# Patient Record
Sex: FEMALE | Race: BLACK OR AFRICAN AMERICAN | NOT HISPANIC OR LATINO
[De-identification: names, ages, dates, MRNs, and addresses within clinical notes are randomized per-mention and may not be internally consistent; named-entity substitution may affect disease eponyms.]

---

## 2017-12-11 PROBLEM — Z00.00 ENCOUNTER FOR PREVENTIVE HEALTH EXAMINATION: Status: ACTIVE | Noted: 2017-12-11

## 2017-12-15 ENCOUNTER — APPOINTMENT (OUTPATIENT)
Dept: UROLOGY | Facility: CLINIC | Age: 57
End: 2017-12-15

## 2018-01-16 ENCOUNTER — OUTPATIENT (OUTPATIENT)
Dept: OUTPATIENT SERVICES | Facility: HOSPITAL | Age: 58
LOS: 1 days | Discharge: HOME | End: 2018-01-16

## 2018-01-16 ENCOUNTER — RESULT REVIEW (OUTPATIENT)
Age: 58
End: 2018-01-16

## 2018-01-16 ENCOUNTER — APPOINTMENT (OUTPATIENT)
Dept: UROLOGY | Facility: CLINIC | Age: 58
End: 2018-01-16
Payer: COMMERCIAL

## 2018-01-16 VITALS
BODY MASS INDEX: 38.36 KG/M2 | DIASTOLIC BLOOD PRESSURE: 76 MMHG | HEIGHT: 71 IN | WEIGHT: 274 LBS | SYSTOLIC BLOOD PRESSURE: 144 MMHG | HEART RATE: 89 BPM

## 2018-01-16 DIAGNOSIS — N28.9 DISORDER OF KIDNEY AND URETER, UNSPECIFIED: ICD-10-CM

## 2018-01-16 DIAGNOSIS — N28.1 CYST OF KIDNEY, ACQUIRED: ICD-10-CM

## 2018-01-16 DIAGNOSIS — I10 ESSENTIAL (PRIMARY) HYPERTENSION: ICD-10-CM

## 2018-01-16 DIAGNOSIS — H40.9 UNSPECIFIED GLAUCOMA: ICD-10-CM

## 2018-01-16 PROCEDURE — 99204 OFFICE O/P NEW MOD 45 MIN: CPT

## 2018-01-16 RX ORDER — BRIMONIDINE TARTRATE, TIMOLOL MALEATE 2; 5 MG/ML; MG/ML
0.2-0.5 SOLUTION/ DROPS OPHTHALMIC
Refills: 0 | Status: ACTIVE | COMMUNITY

## 2018-01-16 RX ORDER — LISINOPRIL AND HYDROCHLOROTHIAZIDE TABLETS 10; 12.5 MG/1; MG/1
10-12.5 TABLET ORAL
Refills: 0 | Status: ACTIVE | COMMUNITY

## 2018-01-16 RX ORDER — AMLODIPINE BESYLATE 10 MG/1
10 TABLET ORAL
Refills: 0 | Status: ACTIVE | COMMUNITY

## 2018-01-17 ENCOUNTER — RESULT REVIEW (OUTPATIENT)
Age: 58
End: 2018-01-17

## 2018-01-23 LAB
APPEARANCE UR: NORMAL
BACTERIA UR CULT: NORMAL
BILIRUB UR QL STRIP: NEGATIVE
COLOR UR: YELLOW
GLUCOSE UR STRIP-MCNC: NEGATIVE MG/DL
HGB UR QL STRIP: NEGATIVE
KETONES UR STRIP-MCNC: NEGATIVE MG/DL
NITRITE UR QL STRIP: NEGATIVE
PH UR STRIP: 6.5
PROT UR STRIP-MCNC: NEGATIVE MG/DL
SP GR UR STRIP: 1.02
URINE COMP/EPITH (NORTH): ABNORMAL
UROBILINOGEN UR STRIP-MCNC: 1 MG/DL
WBC URNS QL MICRO: ABNORMAL
WBC URNS QL MICRO: ABNORMAL P/HPF

## 2018-01-30 ENCOUNTER — TRANSCRIPTION ENCOUNTER (OUTPATIENT)
Age: 58
End: 2018-01-30

## 2018-03-19 ENCOUNTER — APPOINTMENT (OUTPATIENT)
Dept: UROLOGY | Facility: CLINIC | Age: 58
End: 2018-03-19

## 2019-01-22 ENCOUNTER — APPOINTMENT (OUTPATIENT)
Dept: UROLOGY | Facility: CLINIC | Age: 59
End: 2019-01-22

## 2020-12-02 ENCOUNTER — RESULT REVIEW (OUTPATIENT)
Age: 60
End: 2020-12-02

## 2021-02-24 ENCOUNTER — APPOINTMENT (OUTPATIENT)
Dept: VASCULAR SURGERY | Facility: CLINIC | Age: 61
End: 2021-02-24
Payer: COMMERCIAL

## 2021-02-24 VITALS
HEIGHT: 71 IN | TEMPERATURE: 94.6 F | SYSTOLIC BLOOD PRESSURE: 145 MMHG | HEART RATE: 88 BPM | BODY MASS INDEX: 35 KG/M2 | WEIGHT: 250 LBS | DIASTOLIC BLOOD PRESSURE: 88 MMHG

## 2021-02-24 PROCEDURE — 93978 VASCULAR STUDY: CPT

## 2021-02-24 PROCEDURE — 99072 ADDL SUPL MATRL&STAF TM PHE: CPT

## 2021-02-24 PROCEDURE — 99202 OFFICE O/P NEW SF 15 MIN: CPT

## 2021-02-24 NOTE — DATA REVIEWED
[FreeTextEntry1] : aortic duplex patent abdominal aorta with no evidence or aneurysmal dilation. Bilateral diffuse fibrocalcific plaque noted through out the aorto iliac arteries with no evidence of hemodynamically significant disease.

## 2021-02-24 NOTE — HISTORY OF PRESENT ILLNESS
[FreeTextEntry1] : The patient is a 60-year-old female who had lumbar spine x-rays and had an incidental finding of atherosclerotic disease of her aorta. Patient presents for evaluation. She denies claudication symptoms. She denies a history of smoking or  hypercholesterolemia.

## 2022-06-29 ENCOUNTER — NON-APPOINTMENT (OUTPATIENT)
Age: 62
End: 2022-06-29

## 2022-06-30 ENCOUNTER — APPOINTMENT (OUTPATIENT)
Dept: BREAST CENTER | Facility: CLINIC | Age: 62
End: 2022-06-30

## 2022-06-30 VITALS
DIASTOLIC BLOOD PRESSURE: 78 MMHG | SYSTOLIC BLOOD PRESSURE: 146 MMHG | BODY MASS INDEX: 43.65 KG/M2 | WEIGHT: 262 LBS | TEMPERATURE: 97.7 F | HEIGHT: 65 IN

## 2022-06-30 PROCEDURE — 99203 OFFICE O/P NEW LOW 30 MIN: CPT

## 2022-06-30 NOTE — PHYSICAL EXAM
[Normocephalic] : normocephalic [Atraumatic] : atraumatic [EOMI] : extra ocular movement intact [Examined in the supine and seated position] : examined in the supine and seated position [Symmetrical] : symmetrical [No dominant masses] : no dominant masses in right breast  [No dominant masses] : no dominant masses left breast [No Nipple Retraction] : no left nipple retraction [No Nipple Discharge] : no left nipple discharge [de-identified] : On physical exam, there are no discrete masses in either breast or axilla. There is right breast 7 o'clock clear nipple discharge noted,  no nipple discharge in left breast or inversion bilaterally. There are no skin changes bilaterally.\par \par

## 2022-06-30 NOTE — HISTORY OF PRESENT ILLNESS
[FreeTextEntry1] : Alley is 61 year old female here with new dx of RIGHT breast FA. The patient is seen for right breast pain and bloody nipple discharge. She has no other breast related complaints\par \par \par Her imaging is as follows:\par 03/29/2022 b/l dx mammo and US\par -scattered areas of fibroglandular density.\par - 1.4 cm lobular mass at 3 o'clock position right breast, 7.5 cm from the nipple, new from prior study. \par -There is lobular densities in the retroareolar region of the right breast, increasing from prior study.\par - There is a 1.6 cm lobular mass in the upper outer quadrant right breast, stable in size from prior study in 2016 with interval developing coarse calcifications, suggesting degenerating fibroadenoma. \par \par Right US\par - 1.4 x 0.5 x 1 cm lobular hypoechoic mass at 3 o'clock position, 5 cm from nipple, correlating to the mammogram finding-->ultrasound-guided biopsy \par -1 x 0.7 x 0.9 cm hypoechoic mass with associated calcifications at 10 o'clock position, 4 cm from nipple, corresponding to the mammogram finding, suggesting degenerating fibroadenoma.\par - moderate ductal ectasia with internal debris in the retroareolar region right breast. No definite intraductal mass lesion is identified--> breast MRI study without and with intravenous contrast is suggested.\par BIRADS4\par \par 5/05/2022 b/l breast MRI\par -MR guided biopsy is recommended for suspicious enhancing ducts in the central outer right breast (annotated).\par -MR guided biopsy is recommended for suspicious non-mass enhancement in the upper outer right breast 9-10cmfn (annotated).\par - A 0.9 cm mass in the upper inner right breast 7-cmfn corresponds a right 3:00 5 cm from the nipple axis mass described on diagnostic workup dated 3/29/2022. Clinical correlation with pathology results from the ultrasound-guided core biopsy performed today is recommended for this finding.\par  BI-RADS 4\par \par 5/5/22 \par  US guided 1.5 x 0.7 cm mass in the right 3N 5 ( heart)\par -Fibroadenoma\par \par HISTORICAL RISK FACTORS:\par --no fam hx of breast or ovarian cancr \par -one previous cyst aspiration \par -G0\par -OCP use x 1 year \par -right oophorectomy for cystic ovary

## 2022-06-30 NOTE — DATA REVIEWED
[FreeTextEntry1] : : 03-\par  \par EXAM: DIGITAL BILATERAL DIAGNOSTIC MAMMOGRAM AND TOMOSYNTHESIS AND BREAST ULTRASOUND\par \par HISTORY: The patient is seen for right breast pain and bloody nipple discharge. Family history of breast cancer: None.\par Age: 61 years old. \par \par CLINICAL BREAST EXAMINATION: The patient reports clinical breast exam was more than one year ago. \par \par COMPARISON: The present examination has been compared to prior breast imaging studies dating back to 12/13/2014. \par \par MAMMOGRAM:\par \par TECHNIQUE: Full-field digital mammography of bilateral breasts in craniocaudal and mediolateral oblique projections was obtained. Additional imaging is composed of true lateral view, spot compression views and spot magnification views of the right breast. Low-dose full-field digital breast tomosynthesis examination was performed with tomosynthesis acquisitions and synthesized 2D reconstructed mammogram. Computer-aided detection (CAD) was utilized.  \par \par FINDINGS:\par BREAST COMPOSITION: There are scattered areas of fibroglandular density.\par \par There is a 1.4 cm lobular mass at 3 o'clock position right breast, 7.5 cm from the nipple, new from prior study. There is lobular densities in the retroareolar region of the right breast, increasing from prior study. There is a 1.6 cm lobular mass in the upper outer quadrant right breast, stable in size from prior study in 2016 with interval developing coarse calcifications, suggesting degenerating fibroadenoma. \par \par No suspicious masses, architectural distortion, or significant calcifications are detected in the left breast.\par \par BREAST ULTRASOUND:  \par \par TECHNIQUE: Complete bilateral breast ultrasound, with evaluation of the four quadrants, retroareolar regions and axillae, was performed. \par \par FINDINGS: \par BREAST ECHOTEXTURE: There is a homogeneous background echotexture - fibroglandular. \par \par Right breast: \par There is a 1.4 x 0.5 x 1 cm lobular hypoechoic mass at 3 o'clock position, 5 cm from nipple, correlating to the mammogram finding.\par There is a 1 x 0.7 x 0.9 cm hypoechoic mass with associated calcifications at 10 o'clock position, 4 cm from nipple, corresponding to the mammogram finding, suggesting degenerating fibroadenoma.\par There is moderate ductal ectasia with internal debris in the retroareolar region right breast. No definite intraductal mass lesion is identified.\par \par Left breast:\par No suspicious solid or complex cystic mass is identified.\par \par IMPRESSION: \par 1. New lobular hypoechoic mass at 3 o'clock position right breast, indeterminate. Further evaluation with ultrasound-guided biopsy is recommended.\par \par 2. Moderate ductal ectasia with internal debris in the retroareolar region right breast with no definite intraductal mass lesion identified. Given the history of right breast bloody nipple discharge, further evaluation with breast MRI study without and with intravenous contrast is suggested.\par \par FOLLOW-UP: Ultrasound guided biopsy. \par \par ASSESSMENT: BI-RADS Category 4:  Suspicious. \par \par fidencio: [phone]\par MRN: [id number] Acc: [id number]\par Date of Exam: 05-\par  \par EXAM: MRI BILATERAL BREASTS WITHOUT AND WITH CONTRAST\par \par HISTORY: The patient is 61 years old presents for evaluation of right bloody nipple discharge for the past 2 weeks.. There is no personal or family history of breast cancer. Of note, diagnostic workup for this symptom dated 3/29/2022 recommended ultrasound-guided core biopsy of a right 3:00 5 cm from the nipple axis mass. This biopsy was performed today and pathology is not currently available for review\par \par TECHNIQUE: Breast MR protocol - Multiplanar, multisequence MR imaging of both breasts was performed on a 3T magnet: T2 weighted sequence with fat suppression in the axial plane, T1 weighted sequences with fat suppression before and after the administration of gadolinium contrast in the axial plane, and sagittal reformats. \par \par Contrast: 20 mL gadoterate meglumine from a 20 mL vial.\par \par The patient was scanned in the prone position utilizing a dedicated breast coil.  \par \par Post processing subtraction was performed. This study was analyzed on a [employer] Workstation with capabilities of multiplanar reformatting, maximum intensity projection, computer aided detection and time:signal intensity kinetic curve generation.  Results were compared with the PACS workstation. \par \par COMPARISON: The present examination has been compared to prior breast imaging dated back to 2014.\par \par FINDINGS:  \par \par BACKGROUND BREAST APPEARANCE: There is scattered fibroglandular tissue with mild background parenchymal enhancement.\par \par Right Breast\par *   Within the upper inner right breast 7cmfn, there is a 0.9 cm lobulated mass that is T2 hyperintense and demonstrates persistent kinetics (Image 89/Series [id number]). This mass also appears to demonstrate thin, nonenhancing septations. It corresponds to a right 3:00 5 cm from the nipple axis mass described on diagnostic workup dated 3/29/2022. Clinical correlation with pathology results from biopsy performed today is recommended for this finding.\par *   Within the central outer right breast spanning from the periareolar region to 5-cmfn, there are ectatic ducts demonstrating internal enhancement, T2-hyperintensity and subthreshold kinetics (best appreciated on Image 49/Series [id number]). MR guided biopsy is recommended for further evaluation.\par *   Within the upper outer right breast 9-10cmfn, there is non-mass enhancement in a segmental distribution spanning approximately 4 cm in anterior-posterior dimension (best appreciated on Image 67/Series [id number]). This may be contiguous with the suspicious ductal enhancement described above. MR guided biopsy is recommended for further evaluation.\par \par Left Breast\par *   There is no suspicious enhancement in the left breast.\par \par Lymph Nodes\par *   There is no axillary or internal mammary lymphadenopathy.\par \par Extramammary Findings\par *   There are no significant extramammary findings.\par \par IMPRESSION: \par \par 1.  MR guided biopsy is recommended for suspicious enhancing ducts in the central outer right breast (annotated).\par 2.  MR guided biopsy is recommended for suspicious non-mass enhancement in the upper outer right breast 9-10cmfn (annotated).\par 3.  A 0.9 cm mass in the upper inner right breast 7-cmfn corresponds a right 3:00 5 cm from the nipple axis mass described on diagnostic workup dated 3/29/2022. Clinical correlation with pathology results from the ultrasound-guided core biopsy performed today is recommended for this finding.  \par \par FOLLOW-UP: Biopsy should be considered.\par MR-guided biopsy of the right breast, 2 sites.\par \par ASSESSMENT: BI-RADS Category 4:  Suspicious. \par  05-\par  \par Addendum 1 - 05-\par  \par Final pathology is reported as follows:\par \par Right 3:00 5 cm from the nipple: Fibroadenoma Findings are benign and concordant with imaging. Recommend a 6 month follow-up ultrasound.\par \par Please note MRI of the breast performed on the same day 5/5/2022 was reported separately with recommendation for MRI guided core biopsy.\par \par Thank you for the opportunity to participate in the care of this patient.  \par  \par VALENCIA ORWLAND MD  - Electronically Signed: 05- 9:13 AM \par Physician to Physician Direct Line is: [phone]\par Original Report\par EXAM: ULTRASOUND-GUIDED CORE BIOPSY 1 SITE\par \par HISTORY: The patient is 61 years old and is referred for an ultrasound-guided needle biopsy of previously described mass in the right 3:00 5 cm from the nipple.\par \par COMPARISON: Prior breast imaging studies dated 3/29/2022 \par \par PROCEDURE: Targeted ultrasound performed prior to the procedure reconfirms the 1.5 x 0.7 cm mass in the right 3:00 5 cm from the nipple. \par \par The risks and benefits of the procedure were conveyed to the patient, and the patient consented to the procedure. Universal timeout was performed.\par \par Using sterile technique, 4 mL of 1% lidocaine was administered. A skin incision was made prior to insertion of the biopsy needle. Under sonographic visualization, a 14-gauge Bard marquee spring-loaded core biopsy device was used to sample the target. 4 samples were obtained. A heart shaped biopsy clip was deployed at the biopsy site. \par \par \par A postprocedure mammogram demonstrates appropriate placement of the clip. \par \par The patient tolerated the procedure well without complications. The patient was given postbiopsy care instructions. The specimen was subsequently sent to the pathology lab. \par \par IMPRESSION: 1 site ultrasound-guided core biopsy was performed. \par \par Pathology results and concordance will follow as an addendum to this report. \par \par Thank you for the opportunity to participate in the care of this patient.  \par  \par \par

## 2022-06-30 NOTE — ASSESSMENT
[FreeTextEntry1] : Alley is 61 year old female here with new dx of RIGHT breast FA. The patient is seen for right breast pain and bloody nipple discharge\par On physical exam, there are no discrete masses in either breast or axilla. There is right breast 7 o'clock clear nipple discharge noted,  no nipple discharge in left breast or inversion bilaterally. There are no skin changes bilaterally.\par \par \par Her imaging is as follows:\par 03/29/2022 b/l dx mammo and US\par -scattered areas of fibroglandular density.\par - 1.4 cm lobular mass at 3 o'clock position right breast, 7.5 cm from the nipple, new from prior study. \par -There is lobular densities in the retroareolar region of the right breast, increasing from prior study.\par - There is a 1.6 cm lobular mass in the upper outer quadrant right breast, stable in size from prior study in 2016 with interval developing coarse calcifications, suggesting degenerating fibroadenoma. \par \par Right US\par - 1.4 x 0.5 x 1 cm lobular hypoechoic mass at 3 o'clock position, 5 cm from nipple, correlating to the mammogram finding-->ultrasound-guided biopsy \par -1 x 0.7 x 0.9 cm hypoechoic mass with associated calcifications at 10 o'clock position, 4 cm from nipple, corresponding to the mammogram finding, suggesting degenerating fibroadenoma.\par - moderate ductal ectasia with internal debris in the retroareolar region right breast. No definite intraductal mass lesion is identified--> breast MRI study without and with intravenous contrast is suggested.\par BIRADS4\par \par 5/05/2022 b/l breast MRI\par -MR guided biopsy is recommended for suspicious enhancing ducts in the central outer right breast (annotated).\par -MR guided biopsy is recommended for suspicious non-mass enhancement in the upper outer right breast 9-10cmfn (annotated).\par - A 0.9 cm mass in the upper inner right breast 7-cmfn corresponds a right 3:00 5 cm from the nipple axis mass described on diagnostic workup dated 3/29/2022. Clinical correlation with pathology results from the ultrasound-guided core biopsy performed today is recommended for this finding.\par  BI-RADS 4\par \par 5/5/22 \par  US guided 1.5 x 0.7 cm mass in the right 3N 5 ( heart)\par -Fibroadenoma\par \par \par Alley will be scheduled for MRI guided bx x2 now and she will follow up after with clinical breast exam and to discuss path results \par In regards to the left breast fibroepithelial lesion, the differential diagnosis for these lesions are fibroadenomas vs. Phyllodes tumors.   \par \par We discussed fibroadenomas.  These are benign lesions without any malignant potential.  They are hormonally influenced and can increase or decrease in size and can also regress spontaneously.  They are considered proliferative lesions without atypia.  Patients with these lesions have been found to have a slightly increased relative risk of breast cancer compared to the reference population.  Surgical excision is recommended when the diagnosis in unclear, the lesion is causing pain or breast deformity, or if it is rapidly enlarging. \par \par The reason that we recommend surgical excision for a rapidly enlarging fibroadenoma is because there is a possibility that this could be a Phyllodes Tumor.  The treatment of this lesion is wide local excision with 1 cm margins.  A sentinel lymph node would not be necessary as this disease very rarely spreads to the lymph nodes. \par \par Because her diagnosis is not clear, I have recommended surgical excision of her right breast mass.  This is readily palpable and does NOT need to be localized preoperatively.  We discuss taking 1 cm margins vs. just removing the mass understanding that if the final pathology did reveal a Phyllodes tumor, she would likely have to go back for a re-operation to obtain negative margins vs continued observation.  She is agreeable. \par \par The risks and benefits of the procedure were explained including bleeding, infection, seroma or hematoma formation and possible reoperation.\par \par She is otherwise at an average risk for breast cancer based off her family history and should continue with annual screening mammograms. \par \par All of her questions were answered.  She knows to call with any further questions or concerns.\par \par PLAN:\par -right MRI guided bx x2 now\par -follow up after

## 2022-06-30 NOTE — PAST MEDICAL HISTORY
[Menarche Age ____] : age at menarche was [unfilled] [Menopause Age____] : age at menopause was [unfilled] [Total Preg ___] : G[unfilled] [FreeTextEntry5] : right oophorectomy

## 2022-07-15 ENCOUNTER — NON-APPOINTMENT (OUTPATIENT)
Age: 62
End: 2022-07-15

## 2023-09-08 ENCOUNTER — APPOINTMENT (OUTPATIENT)
Dept: GYNECOLOGIC ONCOLOGY | Facility: CLINIC | Age: 63
End: 2023-09-08
Payer: COMMERCIAL

## 2023-09-08 VITALS
BODY MASS INDEX: 44.15 KG/M2 | WEIGHT: 265 LBS | HEIGHT: 65 IN | DIASTOLIC BLOOD PRESSURE: 93 MMHG | SYSTOLIC BLOOD PRESSURE: 168 MMHG

## 2023-09-08 VITALS
SYSTOLIC BLOOD PRESSURE: 171 MMHG | BODY MASS INDEX: 44.15 KG/M2 | HEIGHT: 65 IN | DIASTOLIC BLOOD PRESSURE: 82 MMHG | WEIGHT: 265 LBS

## 2023-09-08 DIAGNOSIS — N64.52 NIPPLE DISCHARGE: ICD-10-CM

## 2023-09-08 DIAGNOSIS — R93.89 ABNORMAL FINDINGS ON DIAGNOSTIC IMAGING OF OTHER SPECIFIED BODY STRUCTURES: ICD-10-CM

## 2023-09-08 PROCEDURE — 99215 OFFICE O/P EST HI 40 MIN: CPT

## 2023-09-08 NOTE — HISTORY OF PRESENT ILLNESS
[FreeTextEntry1] : 63 y/o GO   with PMB and a Bx c/w carcinosarcoma of the uterus  Patient states she has had PMB on and off for 6-7 years. Had a TV US and was told that the endometrium could not be seen clearly, so she was recommended and got D&C which revealed the Dx  No other complaints  Prior surgeries: open taina, abdominal myomectomy, laparoscopic tuboplasty for PID/endo? PMH: AS mild detected on echo, asymptomatic, HTN, DM FHx neg for cancer

## 2023-09-08 NOTE — REVIEW OF SYSTEMS
[Negative] : Musculoskeletal [Abn Vag Bleeding] : abnormal vaginal bleeding [Rash] : no rash noted [Ulcer] : no ulcer was seen [Syncope] : no syncope noted [Neuropathy] : no neuropathy [Depression] : no depression [Diabetes] : diabetes mellitus [Dysuria] : no dysuria [Dyspareunia] : no dyspareunia [Incontinence] : no incontinence [Fatigue] : no fatigue [Recent Wt Gain ___ Lbs] : no recent weight gain [Recent Wt Loss___ Lbs] : no recent weight loss [Chest Pain] : no chest pain [BENITEZ] : no dyspnea on exertion [Wheezing] : no wheezing [SOB on Exertion] : no shortness of breath during exertion [Bloody Stools] : no bloody stools [Nausea] : no nausea/vomitting [Muscle Weakness] : no muscle weakness

## 2023-09-08 NOTE — DISCUSSION/SUMMARY
[Reviewed Clinical Lab Test(s)] : Results of clinical tests were reviewed. [Reviewed Radiology Report(s)] : Radiology reports were reviewed. [Discuss Tests w/Referring Providers] : Results of labs/radiology studies and the treatment recommendations were discussed with performing/referring physician. [Visit Time ___ Minutes] : [unfilled] minutes [Face to Face Time___ Minutes] : with [unfilled] minutes in face to face consultation. [FreeTextEntry1] : Had a prolonged discussion about her condition The majority of endometrial cancers are stage 1 and require surgery alone, but in certain cases adjuvant therapy is necessary, this is especially true for her as her histology almost always requires adjuvant chemotherapy. Surgery is necessary, recommend robotic hysterectomy, BSO, sentinel lymph node mapping and biopsy in the pelvis as well as likely PA LND. Risks: bleeding, infection and injury, especially to major vessels at time of LND and urinary tract (1%)  All questions answered, education given Preop medical clearance (saw cardiologist prior to D&C)  Preop CT  For likely surgery on 10/2/23

## 2023-09-08 NOTE — PHYSICAL EXAM
[Absent] : CVAT: absent [Normal] : Recto-Vaginal Exam: Normal [Fully active, able to carry on all pre-disease performance without restriction] : Status 0 - Fully active, able to carry on all pre-disease performance without restriction [FreeTextEntry1] : NAD [de-identified] : soft NT/ND  uterus extending 16 weeks [de-identified] : mass in the LIA [de-identified] : 16 weeks [de-identified] : no masses [de-identified] : ND

## 2023-10-02 ENCOUNTER — OUTPATIENT (OUTPATIENT)
Dept: OUTPATIENT SERVICES | Facility: HOSPITAL | Age: 63
LOS: 1 days | End: 2023-10-02
Payer: COMMERCIAL

## 2023-10-02 VITALS
HEART RATE: 76 BPM | HEIGHT: 64 IN | SYSTOLIC BLOOD PRESSURE: 126 MMHG | RESPIRATION RATE: 17 BRPM | DIASTOLIC BLOOD PRESSURE: 78 MMHG | OXYGEN SATURATION: 97 % | TEMPERATURE: 97 F | WEIGHT: 210.1 LBS

## 2023-10-02 DIAGNOSIS — Z98.890 OTHER SPECIFIED POSTPROCEDURAL STATES: Chronic | ICD-10-CM

## 2023-10-02 DIAGNOSIS — Z90.49 ACQUIRED ABSENCE OF OTHER SPECIFIED PARTS OF DIGESTIVE TRACT: Chronic | ICD-10-CM

## 2023-10-02 DIAGNOSIS — C54.1 MALIGNANT NEOPLASM OF ENDOMETRIUM: ICD-10-CM

## 2023-10-02 DIAGNOSIS — Z01.818 ENCOUNTER FOR OTHER PREPROCEDURAL EXAMINATION: ICD-10-CM

## 2023-10-02 LAB
A1C WITH ESTIMATED AVERAGE GLUCOSE RESULT: 6.6 % — HIGH (ref 4–5.6)
ALBUMIN SERPL ELPH-MCNC: 4.4 G/DL — SIGNIFICANT CHANGE UP (ref 3.5–5.2)
ALP SERPL-CCNC: 76 U/L — SIGNIFICANT CHANGE UP (ref 30–115)
ALT FLD-CCNC: 10 U/L — SIGNIFICANT CHANGE UP (ref 0–41)
ANION GAP SERPL CALC-SCNC: 16 MMOL/L — HIGH (ref 7–14)
APTT BLD: 31.1 SEC — SIGNIFICANT CHANGE UP (ref 27–39.2)
AST SERPL-CCNC: 12 U/L — SIGNIFICANT CHANGE UP (ref 0–41)
BASOPHILS # BLD AUTO: 0.04 K/UL — SIGNIFICANT CHANGE UP (ref 0–0.2)
BASOPHILS NFR BLD AUTO: 0.5 % — SIGNIFICANT CHANGE UP (ref 0–1)
BILIRUB SERPL-MCNC: 0.3 MG/DL — SIGNIFICANT CHANGE UP (ref 0.2–1.2)
BLD GP AB SCN SERPL QL: SIGNIFICANT CHANGE UP
BUN SERPL-MCNC: 16 MG/DL — SIGNIFICANT CHANGE UP (ref 10–20)
CALCIUM SERPL-MCNC: 9.2 MG/DL — SIGNIFICANT CHANGE UP (ref 8.4–10.5)
CHLORIDE SERPL-SCNC: 102 MMOL/L — SIGNIFICANT CHANGE UP (ref 98–110)
CO2 SERPL-SCNC: 26 MMOL/L — SIGNIFICANT CHANGE UP (ref 17–32)
CREAT SERPL-MCNC: 0.8 MG/DL — SIGNIFICANT CHANGE UP (ref 0.7–1.5)
EGFR: 84 ML/MIN/1.73M2 — SIGNIFICANT CHANGE UP
EOSINOPHIL # BLD AUTO: 0.23 K/UL — SIGNIFICANT CHANGE UP (ref 0–0.7)
EOSINOPHIL NFR BLD AUTO: 2.9 % — SIGNIFICANT CHANGE UP (ref 0–8)
ESTIMATED AVERAGE GLUCOSE: 143 MG/DL — HIGH (ref 68–114)
GLUCOSE SERPL-MCNC: 139 MG/DL — HIGH (ref 70–99)
HCT VFR BLD CALC: 39.2 % — SIGNIFICANT CHANGE UP (ref 37–47)
HGB BLD-MCNC: 12.3 G/DL — SIGNIFICANT CHANGE UP (ref 12–16)
IMM GRANULOCYTES NFR BLD AUTO: 0.3 % — SIGNIFICANT CHANGE UP (ref 0.1–0.3)
INR BLD: 1.15 RATIO — SIGNIFICANT CHANGE UP (ref 0.65–1.3)
LYMPHOCYTES # BLD AUTO: 1.73 K/UL — SIGNIFICANT CHANGE UP (ref 1.2–3.4)
LYMPHOCYTES # BLD AUTO: 22.2 % — SIGNIFICANT CHANGE UP (ref 20.5–51.1)
MCHC RBC-ENTMCNC: 26.3 PG — LOW (ref 27–31)
MCHC RBC-ENTMCNC: 31.4 G/DL — LOW (ref 32–37)
MCV RBC AUTO: 83.9 FL — SIGNIFICANT CHANGE UP (ref 81–99)
MONOCYTES # BLD AUTO: 0.58 K/UL — SIGNIFICANT CHANGE UP (ref 0.1–0.6)
MONOCYTES NFR BLD AUTO: 7.4 % — SIGNIFICANT CHANGE UP (ref 1.7–9.3)
NEUTROPHILS # BLD AUTO: 5.2 K/UL — SIGNIFICANT CHANGE UP (ref 1.4–6.5)
NEUTROPHILS NFR BLD AUTO: 66.7 % — SIGNIFICANT CHANGE UP (ref 42.2–75.2)
NRBC # BLD: 0 /100 WBCS — SIGNIFICANT CHANGE UP (ref 0–0)
PLATELET # BLD AUTO: 331 K/UL — SIGNIFICANT CHANGE UP (ref 130–400)
PMV BLD: 12.5 FL — HIGH (ref 7.4–10.4)
POTASSIUM SERPL-MCNC: 4 MMOL/L — SIGNIFICANT CHANGE UP (ref 3.5–5)
POTASSIUM SERPL-SCNC: 4 MMOL/L — SIGNIFICANT CHANGE UP (ref 3.5–5)
PROT SERPL-MCNC: 6.8 G/DL — SIGNIFICANT CHANGE UP (ref 6–8)
PROTHROM AB SERPL-ACNC: 13.2 SEC — HIGH (ref 9.95–12.87)
RBC # BLD: 4.67 M/UL — SIGNIFICANT CHANGE UP (ref 4.2–5.4)
RBC # FLD: 13.4 % — SIGNIFICANT CHANGE UP (ref 11.5–14.5)
SODIUM SERPL-SCNC: 144 MMOL/L — SIGNIFICANT CHANGE UP (ref 135–146)
WBC # BLD: 7.8 K/UL — SIGNIFICANT CHANGE UP (ref 4.8–10.8)
WBC # FLD AUTO: 7.8 K/UL — SIGNIFICANT CHANGE UP (ref 4.8–10.8)

## 2023-10-02 PROCEDURE — 85730 THROMBOPLASTIN TIME PARTIAL: CPT

## 2023-10-02 PROCEDURE — 99214 OFFICE O/P EST MOD 30 MIN: CPT | Mod: 25

## 2023-10-02 PROCEDURE — 86850 RBC ANTIBODY SCREEN: CPT

## 2023-10-02 PROCEDURE — 83036 HEMOGLOBIN GLYCOSYLATED A1C: CPT

## 2023-10-02 PROCEDURE — 85025 COMPLETE CBC W/AUTO DIFF WBC: CPT

## 2023-10-02 PROCEDURE — 86901 BLOOD TYPING SEROLOGIC RH(D): CPT

## 2023-10-02 PROCEDURE — 36415 COLL VENOUS BLD VENIPUNCTURE: CPT

## 2023-10-02 PROCEDURE — 93005 ELECTROCARDIOGRAM TRACING: CPT

## 2023-10-02 PROCEDURE — 85610 PROTHROMBIN TIME: CPT

## 2023-10-02 PROCEDURE — 80053 COMPREHEN METABOLIC PANEL: CPT

## 2023-10-02 PROCEDURE — 86900 BLOOD TYPING SEROLOGIC ABO: CPT

## 2023-10-02 PROCEDURE — 93010 ELECTROCARDIOGRAM REPORT: CPT

## 2023-10-02 NOTE — H&P PST ADULT - NSICDXPASTMEDICALHX_GEN_ALL_CORE_FT
PAST MEDICAL HISTORY:  DM (diabetes mellitus)     Former smoker     HTN (hypertension)     Hypercholesteremia     Uterine fibroid      PAST MEDICAL HISTORY:  DM (diabetes mellitus)     Former smoker     HTN (hypertension)     Hypercholesteremia     Obesity, Class II, BMI 35-39.9     Uterine fibroid

## 2023-10-02 NOTE — H&P PST ADULT - HISTORY OF PRESENT ILLNESS
PATIENT DENIES CHEST PAIN, SHORTNESS OF BREATH, PALPITATIONS, COUGHING, FEVER, DYSURIA.      NO COUGH, FEVER, SORE THROAT, HEADACHE, LOSS OF TASTE OR SMELL. NO KNOWN EXPOSURE TO ANYONE WITH COVID. PATIENT WAS INSTRUCTED TO ISOLATE FROM NOW UNTIL THE SURGERY.    Anesthesia Alert  NO--Difficult Airway  NO--History of neck surgery or radiation  NO--Limited ROM of neck  NO--History of Malignant hyperthermia  NO--Personal or family history of Pseudocholinesterase deficiency  NO--Prior Anesthesia Complication  NO--Latex Allergy  NO--Loose teeth  NO--History of Rheumatoid Arthritis  NO--ADELINA  NO--bleeding risk     PATIENT DENIES CHEST PAIN, SHORTNESS OF BREATH, PALPITATIONS, COUGHING, FEVER, DYSURIA.      NO COUGH, FEVER, SORE THROAT, HEADACHE, LOSS OF TASTE OR SMELL. NO KNOWN EXPOSURE TO ANYONE WITH COVID. PATIENT WAS INSTRUCTED TO ISOLATE FROM NOW UNTIL THE SURGERY.    Anesthesia Alert  NO--Difficult Airway  NO--History of neck surgery or radiation  NO--Limited ROM of neck  NO--History of Malignant hyperthermia  NO--Personal or family history of Pseudocholinesterase deficiency  NO--Prior Anesthesia Complication  NO--Latex Allergy  NO--Loose teeth  NO--History of Rheumatoid Arthritis  NO--ADELINA  NO--bleeding risk    Revised Cardiac Risk Index for Pre-Operative Risk   RESULT SUMMARY:  1 points  Class II Risk  6.0 %  30-day risk of death, MI, or cardiac arrest    INPUTS:  Elevated-risk surgery —> 1 = Yes  History of ischemic heart disease —> 0 = No  History of congestive heart failure —> 0 = No  History of cerebrovascular disease —> 0 = No  Pre-operative treatment with insulin —> 0 = No  Pre-operative creatinine >2 mg/dL / 176.8 µmol/L —> 0 = No    [facility] Activity Status Index (DASI)   RESULT SUMMARY:  58.2 points  The higher the score (maximum 58.2), the higher the functional status.  9.89 METs  INPUTS:  Take care of self —> 2.75 = Yes  Walk indoors —> 1.75 = Yes  Walk 1&ndash;2 blocks on level ground —> 2.75 = Yes  Climb a flight of stairs or walk up a hill —> 5.5 = Yes  Run a short distance —> 8 = Yes  Do light work around the house —> 2.7 = Yes  Do moderate work around the house —> 3.5 = Yes  Do heavy work around the house —> 8 = Yes  Do yardwork —> 4.5 = Yes  Have sexual relations —> 5.25 = Yes  Participate in moderate recreational activities —> 6 = Yes  Participate in strenuous sports —> 7.5 = Yes

## 2023-10-02 NOTE — H&P PST ADULT - REASON FOR ADMISSION
63 Y/O FEMALE HERE FOR PRE-ADMISSION SURGICAL TESTING. PATIENT REPORTS VAGINAL BLEEDING ON & OFF SINCE 2016 ONLY DURING INTERCOURSE. SHE WAS TOLD IT WAS "VAGINAL DRYNESS". HER GYN RETIRED AND SHE WENT TO ANOTHER ONE. THAT WORKUP REVEALED + UTERINE FIBROIDS AND MASS TO THE ENDOMETRIAL LINING.  NOW FOR SCHEDULED PROCEDURE. 61 Y/O FEMALE HERE FOR PRE-ADMISSION SURGICAL TESTING. PATIENT REPORTS VAGINAL BLEEDING ON & OFF SINCE 2016 ONLY DURING INTERCOURSE. SHE WAS TOLD IT WAS "VAGINAL DRYNESS". HER GYN RETIRED AND SHE WENT TO ANOTHER ONE. THAT WORKUP REVEALED + UTERINE FIBROIDS AND MASS TO THE ENDOMETRIAL LINING.  SHE STATES SHE WAS TOLD IT WAS POSITIVE FOR CANCER.  NOW FOR SCHEDULED ROBOTIC ASSISTED TOTAL LAPAROSCOPIC HYSTERECTOMY, BILATERAL SALPINGO-OOPHERECTOMY, SENTINEL LYMPH NODE MAPPING AND BIOPSY, PARA-AORTIC LYMPH NODE DISSECTION, OMENTECTOMY.

## 2023-10-03 ENCOUNTER — TRANSCRIPTION ENCOUNTER (OUTPATIENT)
Age: 63
End: 2023-10-03

## 2023-10-03 DIAGNOSIS — Z01.818 ENCOUNTER FOR OTHER PREPROCEDURAL EXAMINATION: ICD-10-CM

## 2023-10-03 DIAGNOSIS — C54.1 MALIGNANT NEOPLASM OF ENDOMETRIUM: ICD-10-CM

## 2023-10-13 NOTE — ASU PATIENT PROFILE, ADULT - NSICDXPASTMEDICALHX_GEN_ALL_CORE_FT
PAST MEDICAL HISTORY:  DM (diabetes mellitus)     Former smoker     HTN (hypertension)     Hypercholesteremia     Obesity, Class II, BMI 35-39.9     Uterine fibroid

## 2023-10-16 ENCOUNTER — INPATIENT (INPATIENT)
Facility: HOSPITAL | Age: 63
LOS: 4 days | Discharge: HOME | DRG: 740 | End: 2023-10-21
Attending: OBSTETRICS & GYNECOLOGY | Admitting: OBSTETRICS & GYNECOLOGY
Payer: COMMERCIAL

## 2023-10-16 ENCOUNTER — APPOINTMENT (OUTPATIENT)
Dept: GYNECOLOGIC ONCOLOGY | Facility: HOSPITAL | Age: 63
End: 2023-10-16

## 2023-10-16 ENCOUNTER — TRANSCRIPTION ENCOUNTER (OUTPATIENT)
Age: 63
End: 2023-10-16

## 2023-10-16 ENCOUNTER — RESULT REVIEW (OUTPATIENT)
Age: 63
End: 2023-10-16

## 2023-10-16 VITALS
RESPIRATION RATE: 17 BRPM | HEART RATE: 63 BPM | DIASTOLIC BLOOD PRESSURE: 70 MMHG | TEMPERATURE: 100 F | WEIGHT: 244.93 LBS | SYSTOLIC BLOOD PRESSURE: 146 MMHG | OXYGEN SATURATION: 97 % | HEIGHT: 65 IN

## 2023-10-16 DIAGNOSIS — Z98.890 OTHER SPECIFIED POSTPROCEDURAL STATES: Chronic | ICD-10-CM

## 2023-10-16 DIAGNOSIS — C54.1 MALIGNANT NEOPLASM OF ENDOMETRIUM: ICD-10-CM

## 2023-10-16 DIAGNOSIS — Z90.49 ACQUIRED ABSENCE OF OTHER SPECIFIED PARTS OF DIGESTIVE TRACT: Chronic | ICD-10-CM

## 2023-10-16 LAB
ABO RH CONFIRMATION: SIGNIFICANT CHANGE UP
ANION GAP SERPL CALC-SCNC: 9 MMOL/L — SIGNIFICANT CHANGE UP (ref 7–14)
BASOPHILS # BLD AUTO: 0.03 K/UL — SIGNIFICANT CHANGE UP (ref 0–0.2)
BASOPHILS NFR BLD AUTO: 0.2 % — SIGNIFICANT CHANGE UP (ref 0–1)
BUN SERPL-MCNC: 17 MG/DL — SIGNIFICANT CHANGE UP (ref 10–20)
CALCIUM SERPL-MCNC: 7.9 MG/DL — LOW (ref 8.4–10.5)
CHLORIDE SERPL-SCNC: 105 MMOL/L — SIGNIFICANT CHANGE UP (ref 98–110)
CO2 SERPL-SCNC: 27 MMOL/L — SIGNIFICANT CHANGE UP (ref 17–32)
CREAT SERPL-MCNC: 0.9 MG/DL — SIGNIFICANT CHANGE UP (ref 0.7–1.5)
EGFR: 73 ML/MIN/1.73M2 — SIGNIFICANT CHANGE UP
EOSINOPHIL # BLD AUTO: 0 K/UL — SIGNIFICANT CHANGE UP (ref 0–0.7)
EOSINOPHIL NFR BLD AUTO: 0 % — SIGNIFICANT CHANGE UP (ref 0–8)
GLUCOSE BLDC GLUCOMTR-MCNC: 120 MG/DL — HIGH (ref 70–99)
GLUCOSE BLDC GLUCOMTR-MCNC: 170 MG/DL — HIGH (ref 70–99)
GLUCOSE BLDC GLUCOMTR-MCNC: 170 MG/DL — HIGH (ref 70–99)
GLUCOSE BLDC GLUCOMTR-MCNC: 176 MG/DL — HIGH (ref 70–99)
GLUCOSE BLDC GLUCOMTR-MCNC: 176 MG/DL — HIGH (ref 70–99)
GLUCOSE BLDC GLUCOMTR-MCNC: 177 MG/DL — HIGH (ref 70–99)
GLUCOSE BLDC GLUCOMTR-MCNC: 66 MG/DL — LOW (ref 70–99)
GLUCOSE BLDC GLUCOMTR-MCNC: 66 MG/DL — LOW (ref 70–99)
GLUCOSE SERPL-MCNC: 170 MG/DL — HIGH (ref 70–99)
HCT VFR BLD CALC: 31.5 % — LOW (ref 37–47)
HGB BLD-MCNC: 9.7 G/DL — LOW (ref 12–16)
IMM GRANULOCYTES NFR BLD AUTO: 0.5 % — HIGH (ref 0.1–0.3)
LYMPHOCYTES # BLD AUTO: 0.87 K/UL — LOW (ref 1.2–3.4)
LYMPHOCYTES # BLD AUTO: 4.4 % — LOW (ref 20.5–51.1)
MCHC RBC-ENTMCNC: 25.6 PG — LOW (ref 27–31)
MCHC RBC-ENTMCNC: 30.8 G/DL — LOW (ref 32–37)
MCV RBC AUTO: 83.1 FL — SIGNIFICANT CHANGE UP (ref 81–99)
MONOCYTES # BLD AUTO: 1.15 K/UL — HIGH (ref 0.1–0.6)
MONOCYTES NFR BLD AUTO: 5.8 % — SIGNIFICANT CHANGE UP (ref 1.7–9.3)
NEUTROPHILS # BLD AUTO: 17.66 K/UL — HIGH (ref 1.4–6.5)
NEUTROPHILS NFR BLD AUTO: 89.1 % — HIGH (ref 42.2–75.2)
NRBC # BLD: 0 /100 WBCS — SIGNIFICANT CHANGE UP (ref 0–0)
PLATELET # BLD AUTO: 317 K/UL — SIGNIFICANT CHANGE UP (ref 130–400)
PMV BLD: 11.3 FL — HIGH (ref 7.4–10.4)
POTASSIUM SERPL-MCNC: 3.6 MMOL/L — SIGNIFICANT CHANGE UP (ref 3.5–5)
POTASSIUM SERPL-SCNC: 3.6 MMOL/L — SIGNIFICANT CHANGE UP (ref 3.5–5)
RBC # BLD: 3.79 M/UL — LOW (ref 4.2–5.4)
RBC # FLD: 13.4 % — SIGNIFICANT CHANGE UP (ref 11.5–14.5)
SODIUM SERPL-SCNC: 141 MMOL/L — SIGNIFICANT CHANGE UP (ref 135–146)
WBC # BLD: 19.8 K/UL — HIGH (ref 4.8–10.8)
WBC # FLD AUTO: 19.8 K/UL — HIGH (ref 4.8–10.8)

## 2023-10-16 PROCEDURE — 88342 IMHCHEM/IMCYTCHM 1ST ANTB: CPT | Mod: 26

## 2023-10-16 PROCEDURE — 86900 BLOOD TYPING SEROLOGIC ABO: CPT

## 2023-10-16 PROCEDURE — 88360 TUMOR IMMUNOHISTOCHEM/MANUAL: CPT | Mod: 26,59

## 2023-10-16 PROCEDURE — 82570 ASSAY OF URINE CREATININE: CPT

## 2023-10-16 PROCEDURE — 82962 GLUCOSE BLOOD TEST: CPT

## 2023-10-16 PROCEDURE — 88305 TISSUE EXAM BY PATHOLOGIST: CPT | Mod: 26

## 2023-10-16 PROCEDURE — 88341 IMHCHEM/IMCYTCHM EA ADD ANTB: CPT | Mod: 26

## 2023-10-16 PROCEDURE — C9399: CPT

## 2023-10-16 PROCEDURE — 38792 RA TRACER ID OF SENTINL NODE: CPT | Mod: 50

## 2023-10-16 PROCEDURE — 86850 RBC ANTIBODY SCREEN: CPT

## 2023-10-16 PROCEDURE — 80048 BASIC METABOLIC PNL TOTAL CA: CPT

## 2023-10-16 PROCEDURE — 88341 IMHCHEM/IMCYTCHM EA ADD ANTB: CPT | Mod: 26,59

## 2023-10-16 PROCEDURE — 36415 COLL VENOUS BLD VENIPUNCTURE: CPT

## 2023-10-16 PROCEDURE — 88302 TISSUE EXAM BY PATHOLOGIST: CPT | Mod: 26

## 2023-10-16 PROCEDURE — 83735 ASSAY OF MAGNESIUM: CPT

## 2023-10-16 PROCEDURE — 88112 CYTOPATH CELL ENHANCE TECH: CPT | Mod: 26

## 2023-10-16 PROCEDURE — 76770 US EXAM ABDO BACK WALL COMP: CPT

## 2023-10-16 PROCEDURE — 58953 TAH RAD DISSECT FOR DEBULK: CPT

## 2023-10-16 PROCEDURE — 84100 ASSAY OF PHOSPHORUS: CPT

## 2023-10-16 PROCEDURE — 88342 IMHCHEM/IMCYTCHM 1ST ANTB: CPT | Mod: 26,59

## 2023-10-16 PROCEDURE — 97162 PT EVAL MOD COMPLEX 30 MIN: CPT | Mod: GP

## 2023-10-16 PROCEDURE — 88309 TISSUE EXAM BY PATHOLOGIST: CPT | Mod: 26

## 2023-10-16 PROCEDURE — 85025 COMPLETE CBC W/AUTO DIFF WBC: CPT

## 2023-10-16 PROCEDURE — 83605 ASSAY OF LACTIC ACID: CPT

## 2023-10-16 PROCEDURE — 84300 ASSAY OF URINE SODIUM: CPT

## 2023-10-16 PROCEDURE — 86304 IMMUNOASSAY TUMOR CA 125: CPT

## 2023-10-16 PROCEDURE — 86901 BLOOD TYPING SEROLOGIC RH(D): CPT

## 2023-10-16 RX ORDER — DEXTROSE 50 % IN WATER 50 %
25 SYRINGE (ML) INTRAVENOUS ONCE
Refills: 0 | Status: DISCONTINUED | OUTPATIENT
Start: 2023-10-16 | End: 2023-10-21

## 2023-10-16 RX ORDER — HYDROMORPHONE HYDROCHLORIDE 2 MG/ML
0.5 INJECTION INTRAMUSCULAR; INTRAVENOUS; SUBCUTANEOUS
Refills: 0 | Status: DISCONTINUED | OUTPATIENT
Start: 2023-10-16 | End: 2023-10-16

## 2023-10-16 RX ORDER — ONDANSETRON 8 MG/1
4 TABLET, FILM COATED ORAL ONCE
Refills: 0 | Status: DISCONTINUED | OUTPATIENT
Start: 2023-10-16 | End: 2023-10-16

## 2023-10-16 RX ORDER — AMLODIPINE BESYLATE 2.5 MG/1
10 TABLET ORAL DAILY
Refills: 0 | Status: DISCONTINUED | OUTPATIENT
Start: 2023-10-16 | End: 2023-10-21

## 2023-10-16 RX ORDER — ONDANSETRON 8 MG/1
4 TABLET, FILM COATED ORAL EVERY 6 HOURS
Refills: 0 | Status: DISCONTINUED | OUTPATIENT
Start: 2023-10-16 | End: 2023-10-21

## 2023-10-16 RX ORDER — SODIUM CHLORIDE 9 MG/ML
1000 INJECTION, SOLUTION INTRAVENOUS
Refills: 0 | Status: DISCONTINUED | OUTPATIENT
Start: 2023-10-16 | End: 2023-10-21

## 2023-10-16 RX ORDER — INSULIN LISPRO 100/ML
VIAL (ML) SUBCUTANEOUS
Refills: 0 | Status: DISCONTINUED | OUTPATIENT
Start: 2023-10-16 | End: 2023-10-21

## 2023-10-16 RX ORDER — ENOXAPARIN SODIUM 100 MG/ML
40 INJECTION SUBCUTANEOUS ONCE
Refills: 0 | Status: DISCONTINUED | OUTPATIENT
Start: 2023-10-16 | End: 2023-10-16

## 2023-10-16 RX ORDER — HYDROMORPHONE HYDROCHLORIDE 2 MG/ML
30 INJECTION INTRAMUSCULAR; INTRAVENOUS; SUBCUTANEOUS
Refills: 0 | Status: DISCONTINUED | OUTPATIENT
Start: 2023-10-16 | End: 2023-10-20

## 2023-10-16 RX ORDER — SENNA PLUS 8.6 MG/1
2 TABLET ORAL AT BEDTIME
Refills: 0 | Status: DISCONTINUED | OUTPATIENT
Start: 2023-10-16 | End: 2023-10-21

## 2023-10-16 RX ORDER — NALOXONE HYDROCHLORIDE 4 MG/.1ML
0.1 SPRAY NASAL
Refills: 0 | Status: DISCONTINUED | OUTPATIENT
Start: 2023-10-16 | End: 2023-10-21

## 2023-10-16 RX ORDER — ENOXAPARIN SODIUM 100 MG/ML
40 INJECTION SUBCUTANEOUS EVERY 24 HOURS
Refills: 0 | Status: DISCONTINUED | OUTPATIENT
Start: 2023-10-16 | End: 2023-10-20

## 2023-10-16 RX ORDER — SODIUM CHLORIDE 9 MG/ML
1000 INJECTION, SOLUTION INTRAVENOUS
Refills: 0 | Status: DISCONTINUED | OUTPATIENT
Start: 2023-10-16 | End: 2023-10-19

## 2023-10-16 RX ORDER — SIMETHICONE 80 MG/1
80 TABLET, CHEWABLE ORAL EVERY 6 HOURS
Refills: 0 | Status: DISCONTINUED | OUTPATIENT
Start: 2023-10-16 | End: 2023-10-21

## 2023-10-16 RX ORDER — IBUPROFEN 200 MG
600 TABLET ORAL EVERY 6 HOURS
Refills: 0 | Status: DISCONTINUED | OUTPATIENT
Start: 2023-10-16 | End: 2023-10-17

## 2023-10-16 RX ORDER — ACETAMINOPHEN 500 MG
1000 TABLET ORAL EVERY 6 HOURS
Refills: 0 | Status: DISCONTINUED | OUTPATIENT
Start: 2023-10-16 | End: 2023-10-21

## 2023-10-16 RX ORDER — METFORMIN HYDROCHLORIDE 850 MG/1
1000 TABLET ORAL EVERY 12 HOURS
Refills: 0 | Status: DISCONTINUED | OUTPATIENT
Start: 2023-10-17 | End: 2023-10-21

## 2023-10-16 RX ORDER — GLUCAGON INJECTION, SOLUTION 0.5 MG/.1ML
1 INJECTION, SOLUTION SUBCUTANEOUS ONCE
Refills: 0 | Status: DISCONTINUED | OUTPATIENT
Start: 2023-10-16 | End: 2023-10-21

## 2023-10-16 RX ORDER — DEXTROSE 50 % IN WATER 50 %
15 SYRINGE (ML) INTRAVENOUS ONCE
Refills: 0 | Status: DISCONTINUED | OUTPATIENT
Start: 2023-10-16 | End: 2023-10-21

## 2023-10-16 RX ORDER — HYDROMORPHONE HYDROCHLORIDE 2 MG/ML
1 INJECTION INTRAMUSCULAR; INTRAVENOUS; SUBCUTANEOUS
Refills: 0 | Status: DISCONTINUED | OUTPATIENT
Start: 2023-10-16 | End: 2023-10-16

## 2023-10-16 RX ORDER — ONDANSETRON 8 MG/1
8 TABLET, FILM COATED ORAL EVERY 8 HOURS
Refills: 0 | Status: DISCONTINUED | OUTPATIENT
Start: 2023-10-16 | End: 2023-10-21

## 2023-10-16 RX ORDER — INSULIN LISPRO 100/ML
VIAL (ML) SUBCUTANEOUS AT BEDTIME
Refills: 0 | Status: DISCONTINUED | OUTPATIENT
Start: 2023-10-16 | End: 2023-10-21

## 2023-10-16 RX ORDER — DEXTROSE 50 % IN WATER 50 %
12.5 SYRINGE (ML) INTRAVENOUS ONCE
Refills: 0 | Status: DISCONTINUED | OUTPATIENT
Start: 2023-10-16 | End: 2023-10-21

## 2023-10-16 RX ORDER — LISINOPRIL 2.5 MG/1
10 TABLET ORAL DAILY
Refills: 0 | Status: DISCONTINUED | OUTPATIENT
Start: 2023-10-16 | End: 2023-10-21

## 2023-10-16 RX ADMIN — SODIUM CHLORIDE 75 MILLILITER(S): 9 INJECTION, SOLUTION INTRAVENOUS at 14:42

## 2023-10-16 RX ADMIN — Medication 1000 MILLIGRAM(S): at 17:31

## 2023-10-16 RX ADMIN — Medication 1000 MILLIGRAM(S): at 23:08

## 2023-10-16 RX ADMIN — ENOXAPARIN SODIUM 40 MILLIGRAM(S): 100 INJECTION SUBCUTANEOUS at 13:34

## 2023-10-16 RX ADMIN — SIMETHICONE 80 MILLIGRAM(S): 80 TABLET, CHEWABLE ORAL at 17:31

## 2023-10-16 RX ADMIN — SENNA PLUS 2 TABLET(S): 8.6 TABLET ORAL at 21:54

## 2023-10-16 RX ADMIN — SIMETHICONE 80 MILLIGRAM(S): 80 TABLET, CHEWABLE ORAL at 23:47

## 2023-10-16 RX ADMIN — HYDROMORPHONE HYDROCHLORIDE 30 MILLILITER(S): 2 INJECTION INTRAMUSCULAR; INTRAVENOUS; SUBCUTANEOUS at 13:27

## 2023-10-16 NOTE — BRIEF OPERATIVE NOTE - SPECIMENS
Uterus, cervix, left fallopian tube and ovary, partial right fallopian tube, peritoneal washing, peritoneal implant

## 2023-10-16 NOTE — PATIENT PROFILE ADULT - FALL HARM RISK - HARM RISK INTERVENTIONS
w/ walker/fair plus Assistance with ambulation/Assistance OOB with selected safe patient handling equipment/Communicate Risk of Fall with Harm to all staff/Monitor gait and stability/Reinforce activity limits and safety measures with patient and family/Sit up slowly, dangle for a short time, stand at bedside before walking/Tailored Fall Risk Interventions/Use of alarms - bed, chair and/or voice tab/Visual Cue: Yellow wristband and red socks/Bed in lowest position, wheels locked, appropriate side rails in place/Call bell, personal items and telephone in reach/Instruct patient to call for assistance before getting out of bed or chair/Non-slip footwear when patient is out of bed/[place] to call system/Physically safe environment - no spills, clutter or unnecessary equipment/Purposeful Proactive Rounding/Room/bathroom lighting operational, light cord in reach

## 2023-10-16 NOTE — BRIEF OPERATIVE NOTE - NSICDXBRIEFPROCEDURE_GEN_ALL_CORE_FT
PROCEDURES:  SANNA-BSO, with total omentectomy 16-Oct-2023 11:59:24  Maria C Tarango   PROCEDURES:  SANNA-BSO, with total omentectomy 16-Oct-2023 11:59:24 modified radical, with sentinel lymph node mapping Maria C Tarango

## 2023-10-16 NOTE — CHART NOTE - NSCHARTNOTEFT_GEN_A_CORE
PACU ANESTHESIA ADMISSION NOTE      Procedure: SANNA-BSO, with total omentectomy  modified radical, with sentinel lymph node mapping      Post op diagnosis:  Endometrial cancer        ____  Intubated  TV:______       Rate: ______      FiO2: ______    _x___  Patent Airway    _x___  Full return of protective reflexes    _x___  Full recovery from anesthesia / back to baseline status    Vitals:  T(F): 98.2  HR: 81  BP: 134/52  RR: 17  SpO2: 99%    Mental Status:  _x___ Awake   __x___ Alert   _____ Drowsy   _____ Sedated    Nausea/Vomiting:  _x___  NO       ______Yes,   See Post - Op Orders         Pain Scale (0-10):  __0___    Treatment: _x___ None    ____ See Post - Op/PCA Orders    Post - Operative Fluids:   __x__ Oral   ____ See Post - Op Orders    Plan: Discharge:   ____Home       ___x__Floor     _____Critical Care    _____  Other:_________________    Comments:  No anesthesia issues or complications noted.  Discharge when criteria met.

## 2023-10-16 NOTE — PROGRESS NOTE ADULT - ASSESSMENT
A/P 61 y/o G0, PMHx T2DM, HTN, HLD, obesity s/p SANNA-BSO, omentectomy for carcinosarcoma, EBL 1000, POD#0, recovering well    - PCA pump for pain mgmt  - Lovenox 40mg QD for DVT ppx  - ovalle until AM   - regular diet  - vitals q4  - incentive spirometry   - f/u AM labs   - c/w metformin, amlodipine hydrochlorothiazide     Dr. Tarango and Dr. Patino to be made aware.  A/P 63 y/o G0, PMHx T2DM, HTN, HLD, obesity s/p SANNA-BSO, omentectomy for carcinosarcoma, EBL 1000, POD#0, recovering well    - PCA pump for pain mgmt  - Lovenox 40mg QD for DVT ppx  - ovalle until AM   - regular diet  - vitals q4  - incentive spirometry   - f/u AM labs   - c/w metformin, amlodipine hydrochlorothiazide     Dr. Tarango and Dr. Patino to be made aware.

## 2023-10-16 NOTE — BRIEF OPERATIVE NOTE - OPERATION/FINDINGS
Exam under anesthesia revealed a nonmobile, enlarged uterus.     Laparoscopy showed: Enlarged uterus with multiple fibroids, dense adhesions of colon to uterus. Adhesions of bowel to RUQ. Evidence of disease on peritoneum. Surgically absent right ovary and partial right tube identified. Exam under anesthesia revealed a nonmobile, enlarged uterus.     Laparoscopy showed: Large amount of ascites, Enlarged uterus with multiple fibroids, dense adhesions of colon to uterus, and uterus to anterior abdominal wall and bladder. Adhesions of bowel to RUQ with nodularity. Evidence of disease on peritoneum, a right pelvic peritoneal implant resected. Surgically absent right ovary and partial right tube identified. Exam under anesthesia revealed a nonmobile, enlarged uterus.     Laparoscopy showed: Large amount of ascites, Enlarged uterus with multiple fibroids, dense adhesions of colon to uterus, and uterus to anterior abdominal wall and bladder. Adhesions of bowel to RUQ with nodularity. Evidence of disease on peritoneum, a right pelvic peritoneal implant resected. Surgically absent right ovary and partial right tube identified. Due to dense adhesions, it was decided to convert to open.

## 2023-10-16 NOTE — PRE-ANESTHESIA EVALUATION ADULT - HEIGHT IN INCHES
Problem: Adult Inpatient Plan of Care  Goal: Plan of Care Review  Outcome: Ongoing, Progressing  Goal: Patient-Specific Goal (Individualized)  Outcome: Ongoing, Progressing  Goal: Absence of Hospital-Acquired Illness or Injury  Outcome: Ongoing, Progressing  Goal: Optimal Comfort and Wellbeing  Outcome: Ongoing, Progressing  Goal: Readiness for Transition of Care  Outcome: Ongoing, Progressing     Problem: Fall Injury Risk  Goal: Absence of Fall and Fall-Related Injury  Outcome: Ongoing, Progressing      5

## 2023-10-16 NOTE — PROGRESS NOTE ADULT - SUBJECTIVE AND OBJECTIVE BOX
PGY 2 Note    Patient examined at bedside, pain well controlled with PCA pump.  Denies fevers/chills, HA/N/V, CP/SOB/palpitations, abdominal pain, vaginal bleeding, hematuria/dysuria, constipation/diarrhea. Tolerating small sips of liquid, not passing flatus. Not Ambulating. Using incentive spirometry.     T(F): 98.2 (10-16-23 @ 12:20), Max: 99.7 (10-16-23 @ 07:04)  HR: 78 (10-16-23 @ 14:30) (63 - 87)  BP: 123/60 (10-16-23 @ 14:30) (116/54 - 146/70)  RR: 12 (10-16-23 @ 14:30) (12 - 20)  SpO2: 99% (10-16-23 @ 14:30) (96% - 99%)    I&O's Summary    16 Oct 2023 07:01  -  16 Oct 2023 16:07  --------------------------------------------------------  IN: 0 mL / OUT: 60 mL / NET: -60 mL      Urine:   10-16-23 @ 07:01  -  10-16-23 @ 16:07  --------------------------------------------------------  IN: 0 mL / OUT: 60 mL / NET: -60 mL      POCT Blood Glucose.: 177 mg/dL (16 Oct 2023 13:21)  POCT Blood Glucose.: 120 mg/dL (16 Oct 2023 07:10)      Physical Exam:  General: AAOx3. NAD  CVS: RRR. Nl S1S2  Lungs: CTAB  Abdomen: soft, non-tender, non-distended, +BSx4  Incision: vertical skin incision with abdominal dressing in place, clean no drainage, 3 laparoscopic port sites closed with staples, clean/dry/intact   VE: deferred, no bleeding on pad/chux  Ext: No edema. SCDs in place    Labs:             9.7<L>  19.80<H> )-----------( 317      ( 10-16 @ 14:54 )             31.5<L>         Trend:             9.7<L>  19.80<H> )-----------( 317      ( 10-16 @ 14:54 )             31.5<L>        Creatinine: 0.8 (10-02)      Medications:  MEDICATIONS  (STANDING):  acetaminophen     Tablet .. 1000 milliGRAM(s) Oral every 6 hours  amLODIPine   Tablet 10 milliGRAM(s) Oral daily  enoxaparin Injectable 40 milliGRAM(s) SubCutaneous every 24 hours  hydrochlorothiazide 12.5 milliGRAM(s) Oral daily  HYDROmorphone PCA (1 mG/mL) 30 milliLiter(s) PCA Continuous PCA Continuous  ibuprofen  Tablet. 600 milliGRAM(s) Oral every 6 hours  lactated ringers. 1000 milliLiter(s) (75 mL/Hr) IV Continuous <Continuous>  lisinopril 10 milliGRAM(s) Oral daily  senna 2 Tablet(s) Oral at bedtime  simethicone 80 milliGRAM(s) Chew every 6 hours    MEDICATIONS  (PRN):  acetaminophen     Tablet .. 1000 milliGRAM(s) Oral every 6 hours PRN Mild Pain (1 - 3)  HYDROmorphone  Injectable 0.5 milliGRAM(s) IV Push every 10 minutes PRN Moderate Pain (4 - 6)  HYDROmorphone  Injectable 1 milliGRAM(s) IV Push every 10 minutes PRN Severe Pain (7 - 10)  naloxone Injectable 0.1 milliGRAM(s) IV Push every 3 minutes PRN For ANY of the following changes in patient status:  A. RR LESS THAN 10 breaths per minute, B. Oxygen saturation LESS THAN 90%, C. Sedation score of 6  ondansetron    Tablet 8 milliGRAM(s) Oral every 8 hours PRN Nausea and/or Vomiting  ondansetron Injectable 4 milliGRAM(s) IV Push every 6 hours PRN Nausea  ondansetron Injectable 4 milliGRAM(s) IV Push once PRN Nausea and/or Vomiting

## 2023-10-17 ENCOUNTER — LABORATORY RESULT (OUTPATIENT)
Age: 63
End: 2023-10-17

## 2023-10-17 LAB
ANION GAP SERPL CALC-SCNC: 11 MMOL/L — SIGNIFICANT CHANGE UP (ref 7–14)
ANION GAP SERPL CALC-SCNC: 11 MMOL/L — SIGNIFICANT CHANGE UP (ref 7–14)
ANION GAP SERPL CALC-SCNC: 6 MMOL/L — LOW (ref 7–14)
ANION GAP SERPL CALC-SCNC: 6 MMOL/L — LOW (ref 7–14)
BASOPHILS # BLD AUTO: 0.03 K/UL — SIGNIFICANT CHANGE UP (ref 0–0.2)
BASOPHILS # BLD AUTO: 0.03 K/UL — SIGNIFICANT CHANGE UP (ref 0–0.2)
BASOPHILS # BLD AUTO: 0.04 K/UL — SIGNIFICANT CHANGE UP (ref 0–0.2)
BASOPHILS # BLD AUTO: 0.04 K/UL — SIGNIFICANT CHANGE UP (ref 0–0.2)
BASOPHILS NFR BLD AUTO: 0.3 % — SIGNIFICANT CHANGE UP (ref 0–1)
BLD GP AB SCN SERPL QL: SIGNIFICANT CHANGE UP
BLD GP AB SCN SERPL QL: SIGNIFICANT CHANGE UP
BUN SERPL-MCNC: 20 MG/DL — SIGNIFICANT CHANGE UP (ref 10–20)
BUN SERPL-MCNC: 20 MG/DL — SIGNIFICANT CHANGE UP (ref 10–20)
BUN SERPL-MCNC: 21 MG/DL — HIGH (ref 10–20)
BUN SERPL-MCNC: 21 MG/DL — HIGH (ref 10–20)
CALCIUM SERPL-MCNC: 7.6 MG/DL — LOW (ref 8.4–10.5)
CALCIUM SERPL-MCNC: 7.6 MG/DL — LOW (ref 8.4–10.5)
CALCIUM SERPL-MCNC: 8.2 MG/DL — LOW (ref 8.4–10.4)
CALCIUM SERPL-MCNC: 8.2 MG/DL — LOW (ref 8.4–10.4)
CANCER AG125 SERPL-ACNC: 62 U/ML — HIGH
CANCER AG125 SERPL-ACNC: 62 U/ML — HIGH
CHLORIDE SERPL-SCNC: 100 MMOL/L — SIGNIFICANT CHANGE UP (ref 98–110)
CHLORIDE SERPL-SCNC: 100 MMOL/L — SIGNIFICANT CHANGE UP (ref 98–110)
CHLORIDE SERPL-SCNC: 101 MMOL/L — SIGNIFICANT CHANGE UP (ref 98–110)
CHLORIDE SERPL-SCNC: 101 MMOL/L — SIGNIFICANT CHANGE UP (ref 98–110)
CO2 SERPL-SCNC: 24 MMOL/L — SIGNIFICANT CHANGE UP (ref 17–32)
CO2 SERPL-SCNC: 24 MMOL/L — SIGNIFICANT CHANGE UP (ref 17–32)
CO2 SERPL-SCNC: 29 MMOL/L — SIGNIFICANT CHANGE UP (ref 17–32)
CO2 SERPL-SCNC: 29 MMOL/L — SIGNIFICANT CHANGE UP (ref 17–32)
CREAT ?TM UR-MCNC: 84 MG/DL — SIGNIFICANT CHANGE UP
CREAT ?TM UR-MCNC: 84 MG/DL — SIGNIFICANT CHANGE UP
CREAT SERPL-MCNC: 1.2 MG/DL — SIGNIFICANT CHANGE UP (ref 0.7–1.5)
EGFR: 52 ML/MIN/1.73M2 — LOW
EOSINOPHIL # BLD AUTO: 0.01 K/UL — SIGNIFICANT CHANGE UP (ref 0–0.7)
EOSINOPHIL # BLD AUTO: 0.01 K/UL — SIGNIFICANT CHANGE UP (ref 0–0.7)
EOSINOPHIL # BLD AUTO: 0.05 K/UL — SIGNIFICANT CHANGE UP (ref 0–0.7)
EOSINOPHIL # BLD AUTO: 0.05 K/UL — SIGNIFICANT CHANGE UP (ref 0–0.7)
EOSINOPHIL NFR BLD AUTO: 0.1 % — SIGNIFICANT CHANGE UP (ref 0–8)
EOSINOPHIL NFR BLD AUTO: 0.1 % — SIGNIFICANT CHANGE UP (ref 0–8)
EOSINOPHIL NFR BLD AUTO: 0.4 % — SIGNIFICANT CHANGE UP (ref 0–8)
EOSINOPHIL NFR BLD AUTO: 0.4 % — SIGNIFICANT CHANGE UP (ref 0–8)
GLUCOSE BLDC GLUCOMTR-MCNC: 125 MG/DL — HIGH (ref 70–99)
GLUCOSE BLDC GLUCOMTR-MCNC: 125 MG/DL — HIGH (ref 70–99)
GLUCOSE BLDC GLUCOMTR-MCNC: 144 MG/DL — HIGH (ref 70–99)
GLUCOSE BLDC GLUCOMTR-MCNC: 147 MG/DL — HIGH (ref 70–99)
GLUCOSE BLDC GLUCOMTR-MCNC: 147 MG/DL — HIGH (ref 70–99)
GLUCOSE SERPL-MCNC: 127 MG/DL — HIGH (ref 70–99)
GLUCOSE SERPL-MCNC: 127 MG/DL — HIGH (ref 70–99)
GLUCOSE SERPL-MCNC: 134 MG/DL — HIGH (ref 70–99)
GLUCOSE SERPL-MCNC: 134 MG/DL — HIGH (ref 70–99)
HCT VFR BLD CALC: 25.8 % — LOW (ref 37–47)
HCT VFR BLD CALC: 25.8 % — LOW (ref 37–47)
HCT VFR BLD CALC: 27.4 % — LOW (ref 37–47)
HCT VFR BLD CALC: 27.4 % — LOW (ref 37–47)
HGB BLD-MCNC: 8.2 G/DL — LOW (ref 12–16)
HGB BLD-MCNC: 8.2 G/DL — LOW (ref 12–16)
HGB BLD-MCNC: 8.7 G/DL — LOW (ref 12–16)
HGB BLD-MCNC: 8.7 G/DL — LOW (ref 12–16)
IMM GRANULOCYTES NFR BLD AUTO: 0.3 % — SIGNIFICANT CHANGE UP (ref 0.1–0.3)
IMM GRANULOCYTES NFR BLD AUTO: 0.3 % — SIGNIFICANT CHANGE UP (ref 0.1–0.3)
IMM GRANULOCYTES NFR BLD AUTO: 0.4 % — HIGH (ref 0.1–0.3)
IMM GRANULOCYTES NFR BLD AUTO: 0.4 % — HIGH (ref 0.1–0.3)
LACTATE SERPL-SCNC: 1 MMOL/L — SIGNIFICANT CHANGE UP (ref 0.7–2)
LACTATE SERPL-SCNC: 1 MMOL/L — SIGNIFICANT CHANGE UP (ref 0.7–2)
LYMPHOCYTES # BLD AUTO: 1.17 K/UL — LOW (ref 1.2–3.4)
LYMPHOCYTES # BLD AUTO: 1.17 K/UL — LOW (ref 1.2–3.4)
LYMPHOCYTES # BLD AUTO: 1.49 K/UL — SIGNIFICANT CHANGE UP (ref 1.2–3.4)
LYMPHOCYTES # BLD AUTO: 1.49 K/UL — SIGNIFICANT CHANGE UP (ref 1.2–3.4)
LYMPHOCYTES # BLD AUTO: 10 % — LOW (ref 20.5–51.1)
LYMPHOCYTES # BLD AUTO: 10 % — LOW (ref 20.5–51.1)
LYMPHOCYTES # BLD AUTO: 10.8 % — LOW (ref 20.5–51.1)
LYMPHOCYTES # BLD AUTO: 10.8 % — LOW (ref 20.5–51.1)
MAGNESIUM SERPL-MCNC: 1.4 MG/DL — LOW (ref 1.8–2.4)
MAGNESIUM SERPL-MCNC: 1.4 MG/DL — LOW (ref 1.8–2.4)
MAGNESIUM SERPL-MCNC: 1.5 MG/DL — LOW (ref 1.8–2.4)
MAGNESIUM SERPL-MCNC: 1.5 MG/DL — LOW (ref 1.8–2.4)
MCHC RBC-ENTMCNC: 25.9 PG — LOW (ref 27–31)
MCHC RBC-ENTMCNC: 25.9 PG — LOW (ref 27–31)
MCHC RBC-ENTMCNC: 26.5 PG — LOW (ref 27–31)
MCHC RBC-ENTMCNC: 26.5 PG — LOW (ref 27–31)
MCHC RBC-ENTMCNC: 31.8 G/DL — LOW (ref 32–37)
MCV RBC AUTO: 81.6 FL — SIGNIFICANT CHANGE UP (ref 81–99)
MCV RBC AUTO: 81.6 FL — SIGNIFICANT CHANGE UP (ref 81–99)
MCV RBC AUTO: 83.5 FL — SIGNIFICANT CHANGE UP (ref 81–99)
MCV RBC AUTO: 83.5 FL — SIGNIFICANT CHANGE UP (ref 81–99)
MONOCYTES # BLD AUTO: 1.32 K/UL — HIGH (ref 0.1–0.6)
MONOCYTES # BLD AUTO: 1.32 K/UL — HIGH (ref 0.1–0.6)
MONOCYTES # BLD AUTO: 1.61 K/UL — HIGH (ref 0.1–0.6)
MONOCYTES # BLD AUTO: 1.61 K/UL — HIGH (ref 0.1–0.6)
MONOCYTES NFR BLD AUTO: 11.3 % — HIGH (ref 1.7–9.3)
MONOCYTES NFR BLD AUTO: 11.3 % — HIGH (ref 1.7–9.3)
MONOCYTES NFR BLD AUTO: 11.6 % — HIGH (ref 1.7–9.3)
MONOCYTES NFR BLD AUTO: 11.6 % — HIGH (ref 1.7–9.3)
NEUTROPHILS # BLD AUTO: 10.62 K/UL — HIGH (ref 1.4–6.5)
NEUTROPHILS # BLD AUTO: 10.62 K/UL — HIGH (ref 1.4–6.5)
NEUTROPHILS # BLD AUTO: 9.06 K/UL — HIGH (ref 1.4–6.5)
NEUTROPHILS # BLD AUTO: 9.06 K/UL — HIGH (ref 1.4–6.5)
NEUTROPHILS NFR BLD AUTO: 76.8 % — HIGH (ref 42.2–75.2)
NEUTROPHILS NFR BLD AUTO: 76.8 % — HIGH (ref 42.2–75.2)
NEUTROPHILS NFR BLD AUTO: 77.7 % — HIGH (ref 42.2–75.2)
NEUTROPHILS NFR BLD AUTO: 77.7 % — HIGH (ref 42.2–75.2)
NRBC # BLD: 0 /100 WBCS — SIGNIFICANT CHANGE UP (ref 0–0)
PHOSPHATE SERPL-MCNC: 3.6 MG/DL — SIGNIFICANT CHANGE UP (ref 2.1–4.9)
PHOSPHATE SERPL-MCNC: 3.6 MG/DL — SIGNIFICANT CHANGE UP (ref 2.1–4.9)
PHOSPHATE SERPL-MCNC: 3.8 MG/DL — SIGNIFICANT CHANGE UP (ref 2.1–4.9)
PHOSPHATE SERPL-MCNC: 3.8 MG/DL — SIGNIFICANT CHANGE UP (ref 2.1–4.9)
PLATELET # BLD AUTO: 284 K/UL — SIGNIFICANT CHANGE UP (ref 130–400)
PLATELET # BLD AUTO: 284 K/UL — SIGNIFICANT CHANGE UP (ref 130–400)
PLATELET # BLD AUTO: 291 K/UL — SIGNIFICANT CHANGE UP (ref 130–400)
PLATELET # BLD AUTO: 291 K/UL — SIGNIFICANT CHANGE UP (ref 130–400)
PMV BLD: 12 FL — HIGH (ref 7.4–10.4)
PMV BLD: 12 FL — HIGH (ref 7.4–10.4)
PMV BLD: 12.2 FL — HIGH (ref 7.4–10.4)
PMV BLD: 12.2 FL — HIGH (ref 7.4–10.4)
POTASSIUM SERPL-MCNC: 4.2 MMOL/L — SIGNIFICANT CHANGE UP (ref 3.5–5)
POTASSIUM SERPL-MCNC: 4.2 MMOL/L — SIGNIFICANT CHANGE UP (ref 3.5–5)
POTASSIUM SERPL-MCNC: 4.3 MMOL/L — SIGNIFICANT CHANGE UP (ref 3.5–5)
POTASSIUM SERPL-MCNC: 4.3 MMOL/L — SIGNIFICANT CHANGE UP (ref 3.5–5)
POTASSIUM SERPL-SCNC: 4.2 MMOL/L — SIGNIFICANT CHANGE UP (ref 3.5–5)
POTASSIUM SERPL-SCNC: 4.2 MMOL/L — SIGNIFICANT CHANGE UP (ref 3.5–5)
POTASSIUM SERPL-SCNC: 4.3 MMOL/L — SIGNIFICANT CHANGE UP (ref 3.5–5)
POTASSIUM SERPL-SCNC: 4.3 MMOL/L — SIGNIFICANT CHANGE UP (ref 3.5–5)
RBC # BLD: 3.16 M/UL — LOW (ref 4.2–5.4)
RBC # BLD: 3.16 M/UL — LOW (ref 4.2–5.4)
RBC # BLD: 3.28 M/UL — LOW (ref 4.2–5.4)
RBC # BLD: 3.28 M/UL — LOW (ref 4.2–5.4)
RBC # FLD: 13.6 % — SIGNIFICANT CHANGE UP (ref 11.5–14.5)
RBC # FLD: 13.6 % — SIGNIFICANT CHANGE UP (ref 11.5–14.5)
RBC # FLD: 13.7 % — SIGNIFICANT CHANGE UP (ref 11.5–14.5)
RBC # FLD: 13.7 % — SIGNIFICANT CHANGE UP (ref 11.5–14.5)
SODIUM SERPL-SCNC: 135 MMOL/L — SIGNIFICANT CHANGE UP (ref 135–146)
SODIUM SERPL-SCNC: 135 MMOL/L — SIGNIFICANT CHANGE UP (ref 135–146)
SODIUM SERPL-SCNC: 136 MMOL/L — SIGNIFICANT CHANGE UP (ref 135–146)
SODIUM SERPL-SCNC: 136 MMOL/L — SIGNIFICANT CHANGE UP (ref 135–146)
SODIUM UR-SCNC: <20 MMOL/L — SIGNIFICANT CHANGE UP
SODIUM UR-SCNC: <20 MMOL/L — SIGNIFICANT CHANGE UP
WBC # BLD: 11.67 K/UL — HIGH (ref 4.8–10.8)
WBC # BLD: 11.67 K/UL — HIGH (ref 4.8–10.8)
WBC # BLD: 13.82 K/UL — HIGH (ref 4.8–10.8)
WBC # BLD: 13.82 K/UL — HIGH (ref 4.8–10.8)
WBC # FLD AUTO: 11.67 K/UL — HIGH (ref 4.8–10.8)
WBC # FLD AUTO: 11.67 K/UL — HIGH (ref 4.8–10.8)
WBC # FLD AUTO: 13.82 K/UL — HIGH (ref 4.8–10.8)
WBC # FLD AUTO: 13.82 K/UL — HIGH (ref 4.8–10.8)

## 2023-10-17 PROCEDURE — 76770 US EXAM ABDO BACK WALL COMP: CPT | Mod: 26

## 2023-10-17 RX ORDER — MAGNESIUM SULFATE 500 MG/ML
2 VIAL (ML) INJECTION ONCE
Refills: 0 | Status: COMPLETED | OUTPATIENT
Start: 2023-10-17 | End: 2023-10-17

## 2023-10-17 RX ORDER — SODIUM CHLORIDE 9 MG/ML
500 INJECTION INTRAMUSCULAR; INTRAVENOUS; SUBCUTANEOUS ONCE
Refills: 0 | Status: COMPLETED | OUTPATIENT
Start: 2023-10-17 | End: 2023-10-17

## 2023-10-17 RX ADMIN — SODIUM CHLORIDE 75 MILLILITER(S): 9 INJECTION, SOLUTION INTRAVENOUS at 01:05

## 2023-10-17 RX ADMIN — AMLODIPINE BESYLATE 10 MILLIGRAM(S): 2.5 TABLET ORAL at 05:49

## 2023-10-17 RX ADMIN — SODIUM CHLORIDE 500 MILLILITER(S): 9 INJECTION INTRAMUSCULAR; INTRAVENOUS; SUBCUTANEOUS at 05:44

## 2023-10-17 RX ADMIN — Medication 25 GRAM(S): at 15:39

## 2023-10-17 RX ADMIN — Medication 1000 MILLIGRAM(S): at 17:26

## 2023-10-17 RX ADMIN — LISINOPRIL 10 MILLIGRAM(S): 2.5 TABLET ORAL at 05:50

## 2023-10-17 RX ADMIN — Medication 1000 MILLIGRAM(S): at 13:05

## 2023-10-17 RX ADMIN — SIMETHICONE 80 MILLIGRAM(S): 80 TABLET, CHEWABLE ORAL at 12:34

## 2023-10-17 RX ADMIN — SODIUM CHLORIDE 500 MILLILITER(S): 9 INJECTION INTRAMUSCULAR; INTRAVENOUS; SUBCUTANEOUS at 01:38

## 2023-10-17 RX ADMIN — SENNA PLUS 2 TABLET(S): 8.6 TABLET ORAL at 22:30

## 2023-10-17 RX ADMIN — SIMETHICONE 80 MILLIGRAM(S): 80 TABLET, CHEWABLE ORAL at 17:26

## 2023-10-17 RX ADMIN — METFORMIN HYDROCHLORIDE 1000 MILLIGRAM(S): 850 TABLET ORAL at 17:25

## 2023-10-17 RX ADMIN — ENOXAPARIN SODIUM 40 MILLIGRAM(S): 100 INJECTION SUBCUTANEOUS at 12:35

## 2023-10-17 RX ADMIN — Medication 1000 MILLIGRAM(S): at 18:06

## 2023-10-17 RX ADMIN — SIMETHICONE 80 MILLIGRAM(S): 80 TABLET, CHEWABLE ORAL at 05:50

## 2023-10-17 RX ADMIN — Medication 1000 MILLIGRAM(S): at 12:34

## 2023-10-17 RX ADMIN — METFORMIN HYDROCHLORIDE 1000 MILLIGRAM(S): 850 TABLET ORAL at 05:50

## 2023-10-17 NOTE — PROGRESS NOTE ADULT - ASSESSMENT
63 y/o G0, PMHx T2DM, HTN, HLD, obesity s/p robotic hysterectomy converted to open, BS, left oophorectomy, absent right ovary, omentectomy for carcinosarcoma, EBL 1000, POD#1, recovering well    - PCA pump for pain mgmt, consider transition to ERAS in afternoon  - Lovenox 40mg QD for DVT ppx  - f/u UO, s/p 1000cc bolus for sluggish output now resolved  - regular diet  - vitals q4  - incentive spirometry   - f/u AM labs with   - c/w metformin, amlodipine hydrochlorothiazide   - FS ACQHS with SSI

## 2023-10-17 NOTE — PROGRESS NOTE ADULT - SUBJECTIVE AND OBJECTIVE BOX
Chief Complaint: S/p robotic hysterectomy converted open with BS, left oophorectomy, absent right ovary, EBL 1000    HPI: Pt at bedside, reports pain is well controlled with PCA pump, reports using it often. Has not tried ambulating since procedure. Has not passed flatus. Denies any dizziness, SOB, chest pain, n/v, or heavy vaginal bleeding. Tolerating reg diet.    ROS: Denies cardiovascular or respiratory symptoms    PAST MEDICAL & SURGICAL HISTORY:  DM (diabetes mellitus)  HTN (hypertension)  Hypercholesteremia  Uterine fibroid  Former smoker  Obesity, Class II, BMI 35-39.9  History of D&C  H/O myomectomy  S/P cholecystectomy    Physical Exam  Vital Signs Last 24 Hrs  T(F): 99.8 (17 Oct 2023 05:14), Max: 99.8 (17 Oct 2023 05:14)  HR: 107 (17 Oct 2023 05:14) (77 - 107)  BP: 125/62 (17 Oct 2023 05:14) (107/57 - 134/62)  RR: 18 (17 Oct 2023 05:14) (12 - 20)    Physical exam:  General - AAOx3, NAD  Heart - S1S2, RRR  Lungs - CTA BL  Abdomen:  - Soft, nontender, nondistended, BS+  - 4 laparoscopic incisions with staples, no drainage or erythema  - Vertical incision covered with dressing, Clean and dry  Pelvis/Vagina - No bleeding  Extremities - No calf tenderness, no swelling, SCDs in place    Labs:                        8.7    13.82 )-----------( 291      ( 17 Oct 2023 05:20 )             27.4                         9.7    19.80 )-----------( 317      ( 16 Oct 2023 14:54 )             31.5     135  |  100  |  21  ----------------------------<127  4.3  |  24  |  1.2  141  |  105  |  17  ----------------------------<170  3.6  |  27  |  0.9    Magnesium: 1.4 mg/dL (10-17-23 @ 05:20)    Phosphorus: 3.8 mg/dL (10-17-23 @ 05:20)

## 2023-10-18 LAB
ANION GAP SERPL CALC-SCNC: 11 MMOL/L — SIGNIFICANT CHANGE UP (ref 7–14)
ANION GAP SERPL CALC-SCNC: 11 MMOL/L — SIGNIFICANT CHANGE UP (ref 7–14)
BASOPHILS # BLD AUTO: 0.04 K/UL — SIGNIFICANT CHANGE UP (ref 0–0.2)
BASOPHILS # BLD AUTO: 0.04 K/UL — SIGNIFICANT CHANGE UP (ref 0–0.2)
BASOPHILS NFR BLD AUTO: 0.4 % — SIGNIFICANT CHANGE UP (ref 0–1)
BASOPHILS NFR BLD AUTO: 0.4 % — SIGNIFICANT CHANGE UP (ref 0–1)
BUN SERPL-MCNC: 17 MG/DL — SIGNIFICANT CHANGE UP (ref 10–20)
BUN SERPL-MCNC: 17 MG/DL — SIGNIFICANT CHANGE UP (ref 10–20)
CALCIUM SERPL-MCNC: 8.2 MG/DL — LOW (ref 8.4–10.5)
CALCIUM SERPL-MCNC: 8.2 MG/DL — LOW (ref 8.4–10.5)
CHLORIDE SERPL-SCNC: 104 MMOL/L — SIGNIFICANT CHANGE UP (ref 98–110)
CHLORIDE SERPL-SCNC: 104 MMOL/L — SIGNIFICANT CHANGE UP (ref 98–110)
CO2 SERPL-SCNC: 29 MMOL/L — SIGNIFICANT CHANGE UP (ref 17–32)
CO2 SERPL-SCNC: 29 MMOL/L — SIGNIFICANT CHANGE UP (ref 17–32)
CREAT SERPL-MCNC: 1.2 MG/DL — SIGNIFICANT CHANGE UP (ref 0.7–1.5)
CREAT SERPL-MCNC: 1.2 MG/DL — SIGNIFICANT CHANGE UP (ref 0.7–1.5)
EGFR: 52 ML/MIN/1.73M2 — LOW
EGFR: 52 ML/MIN/1.73M2 — LOW
EOSINOPHIL # BLD AUTO: 0.37 K/UL — SIGNIFICANT CHANGE UP (ref 0–0.7)
EOSINOPHIL # BLD AUTO: 0.37 K/UL — SIGNIFICANT CHANGE UP (ref 0–0.7)
EOSINOPHIL NFR BLD AUTO: 3.4 % — SIGNIFICANT CHANGE UP (ref 0–8)
EOSINOPHIL NFR BLD AUTO: 3.4 % — SIGNIFICANT CHANGE UP (ref 0–8)
GLUCOSE BLDC GLUCOMTR-MCNC: 124 MG/DL — HIGH (ref 70–99)
GLUCOSE BLDC GLUCOMTR-MCNC: 124 MG/DL — HIGH (ref 70–99)
GLUCOSE BLDC GLUCOMTR-MCNC: 135 MG/DL — HIGH (ref 70–99)
GLUCOSE BLDC GLUCOMTR-MCNC: 139 MG/DL — HIGH (ref 70–99)
GLUCOSE BLDC GLUCOMTR-MCNC: 139 MG/DL — HIGH (ref 70–99)
GLUCOSE BLDC GLUCOMTR-MCNC: 145 MG/DL — HIGH (ref 70–99)
GLUCOSE BLDC GLUCOMTR-MCNC: 145 MG/DL — HIGH (ref 70–99)
GLUCOSE SERPL-MCNC: 129 MG/DL — HIGH (ref 70–99)
GLUCOSE SERPL-MCNC: 129 MG/DL — HIGH (ref 70–99)
HCT VFR BLD CALC: 25.8 % — LOW (ref 37–47)
HCT VFR BLD CALC: 25.8 % — LOW (ref 37–47)
HGB BLD-MCNC: 8.1 G/DL — LOW (ref 12–16)
HGB BLD-MCNC: 8.1 G/DL — LOW (ref 12–16)
IMM GRANULOCYTES NFR BLD AUTO: 0.5 % — HIGH (ref 0.1–0.3)
IMM GRANULOCYTES NFR BLD AUTO: 0.5 % — HIGH (ref 0.1–0.3)
LYMPHOCYTES # BLD AUTO: 0.96 K/UL — LOW (ref 1.2–3.4)
LYMPHOCYTES # BLD AUTO: 0.96 K/UL — LOW (ref 1.2–3.4)
LYMPHOCYTES # BLD AUTO: 8.8 % — LOW (ref 20.5–51.1)
LYMPHOCYTES # BLD AUTO: 8.8 % — LOW (ref 20.5–51.1)
MAGNESIUM SERPL-MCNC: 2 MG/DL — SIGNIFICANT CHANGE UP (ref 1.8–2.4)
MAGNESIUM SERPL-MCNC: 2 MG/DL — SIGNIFICANT CHANGE UP (ref 1.8–2.4)
MCHC RBC-ENTMCNC: 25.8 PG — LOW (ref 27–31)
MCHC RBC-ENTMCNC: 25.8 PG — LOW (ref 27–31)
MCHC RBC-ENTMCNC: 31.4 G/DL — LOW (ref 32–37)
MCHC RBC-ENTMCNC: 31.4 G/DL — LOW (ref 32–37)
MCV RBC AUTO: 82.2 FL — SIGNIFICANT CHANGE UP (ref 81–99)
MCV RBC AUTO: 82.2 FL — SIGNIFICANT CHANGE UP (ref 81–99)
MONOCYTES # BLD AUTO: 1.17 K/UL — HIGH (ref 0.1–0.6)
MONOCYTES # BLD AUTO: 1.17 K/UL — HIGH (ref 0.1–0.6)
MONOCYTES NFR BLD AUTO: 10.7 % — HIGH (ref 1.7–9.3)
MONOCYTES NFR BLD AUTO: 10.7 % — HIGH (ref 1.7–9.3)
NEUTROPHILS # BLD AUTO: 8.37 K/UL — HIGH (ref 1.4–6.5)
NEUTROPHILS # BLD AUTO: 8.37 K/UL — HIGH (ref 1.4–6.5)
NEUTROPHILS NFR BLD AUTO: 76.2 % — HIGH (ref 42.2–75.2)
NEUTROPHILS NFR BLD AUTO: 76.2 % — HIGH (ref 42.2–75.2)
NRBC # BLD: 0 /100 WBCS — SIGNIFICANT CHANGE UP (ref 0–0)
NRBC # BLD: 0 /100 WBCS — SIGNIFICANT CHANGE UP (ref 0–0)
PHOSPHATE SERPL-MCNC: 3.3 MG/DL — SIGNIFICANT CHANGE UP (ref 2.1–4.9)
PHOSPHATE SERPL-MCNC: 3.3 MG/DL — SIGNIFICANT CHANGE UP (ref 2.1–4.9)
PLATELET # BLD AUTO: 291 K/UL — SIGNIFICANT CHANGE UP (ref 130–400)
PLATELET # BLD AUTO: 291 K/UL — SIGNIFICANT CHANGE UP (ref 130–400)
PMV BLD: 11.9 FL — HIGH (ref 7.4–10.4)
PMV BLD: 11.9 FL — HIGH (ref 7.4–10.4)
POTASSIUM SERPL-MCNC: 4.2 MMOL/L — SIGNIFICANT CHANGE UP (ref 3.5–5)
POTASSIUM SERPL-MCNC: 4.2 MMOL/L — SIGNIFICANT CHANGE UP (ref 3.5–5)
POTASSIUM SERPL-SCNC: 4.2 MMOL/L — SIGNIFICANT CHANGE UP (ref 3.5–5)
POTASSIUM SERPL-SCNC: 4.2 MMOL/L — SIGNIFICANT CHANGE UP (ref 3.5–5)
RBC # BLD: 3.14 M/UL — LOW (ref 4.2–5.4)
RBC # BLD: 3.14 M/UL — LOW (ref 4.2–5.4)
RBC # FLD: 13.7 % — SIGNIFICANT CHANGE UP (ref 11.5–14.5)
RBC # FLD: 13.7 % — SIGNIFICANT CHANGE UP (ref 11.5–14.5)
SODIUM SERPL-SCNC: 144 MMOL/L — SIGNIFICANT CHANGE UP (ref 135–146)
SODIUM SERPL-SCNC: 144 MMOL/L — SIGNIFICANT CHANGE UP (ref 135–146)
SURGICAL PATHOLOGY STUDY: SIGNIFICANT CHANGE UP
SURGICAL PATHOLOGY STUDY: SIGNIFICANT CHANGE UP
WBC # BLD: 10.97 K/UL — HIGH (ref 4.8–10.8)
WBC # BLD: 10.97 K/UL — HIGH (ref 4.8–10.8)
WBC # FLD AUTO: 10.97 K/UL — HIGH (ref 4.8–10.8)
WBC # FLD AUTO: 10.97 K/UL — HIGH (ref 4.8–10.8)

## 2023-10-18 PROCEDURE — 76770 US EXAM ABDO BACK WALL COMP: CPT | Mod: 26

## 2023-10-18 RX ORDER — CHLORHEXIDINE GLUCONATE 213 G/1000ML
1 SOLUTION TOPICAL DAILY
Refills: 0 | Status: DISCONTINUED | OUTPATIENT
Start: 2023-10-18 | End: 2023-10-21

## 2023-10-18 RX ADMIN — Medication 1000 MILLIGRAM(S): at 17:47

## 2023-10-18 RX ADMIN — AMLODIPINE BESYLATE 10 MILLIGRAM(S): 2.5 TABLET ORAL at 05:44

## 2023-10-18 RX ADMIN — SIMETHICONE 80 MILLIGRAM(S): 80 TABLET, CHEWABLE ORAL at 17:35

## 2023-10-18 RX ADMIN — Medication 1000 MILLIGRAM(S): at 17:36

## 2023-10-18 RX ADMIN — METFORMIN HYDROCHLORIDE 1000 MILLIGRAM(S): 850 TABLET ORAL at 17:34

## 2023-10-18 RX ADMIN — SIMETHICONE 80 MILLIGRAM(S): 80 TABLET, CHEWABLE ORAL at 05:44

## 2023-10-18 RX ADMIN — Medication 1000 MILLIGRAM(S): at 23:34

## 2023-10-18 RX ADMIN — Medication 1000 MILLIGRAM(S): at 11:52

## 2023-10-18 RX ADMIN — CHLORHEXIDINE GLUCONATE 1 APPLICATION(S): 213 SOLUTION TOPICAL at 17:40

## 2023-10-18 RX ADMIN — Medication 1000 MILLIGRAM(S): at 23:38

## 2023-10-18 RX ADMIN — LISINOPRIL 10 MILLIGRAM(S): 2.5 TABLET ORAL at 05:43

## 2023-10-18 RX ADMIN — Medication 1000 MILLIGRAM(S): at 11:55

## 2023-10-18 RX ADMIN — ENOXAPARIN SODIUM 40 MILLIGRAM(S): 100 INJECTION SUBCUTANEOUS at 11:52

## 2023-10-18 RX ADMIN — SIMETHICONE 80 MILLIGRAM(S): 80 TABLET, CHEWABLE ORAL at 11:51

## 2023-10-18 RX ADMIN — Medication 1000 MILLIGRAM(S): at 06:00

## 2023-10-18 RX ADMIN — SIMETHICONE 80 MILLIGRAM(S): 80 TABLET, CHEWABLE ORAL at 23:34

## 2023-10-18 RX ADMIN — Medication 1000 MILLIGRAM(S): at 05:44

## 2023-10-18 RX ADMIN — SENNA PLUS 2 TABLET(S): 8.6 TABLET ORAL at 22:05

## 2023-10-18 RX ADMIN — METFORMIN HYDROCHLORIDE 1000 MILLIGRAM(S): 850 TABLET ORAL at 05:43

## 2023-10-18 NOTE — PROGRESS NOTE ADULT - SUBJECTIVE AND OBJECTIVE BOX
Chief Complaint: S/p robotic hysterectomy converted open with BS, left oophorectomy, absent right ovary, EBL 1000    HPI: Pt at bedside, has not tried ambulating since procedure, not passing gas, otherwise tolerating regular diet, and pain is well controlled. Denies fever, chills, SOB, chest pain, n/v, severe abdominal pain, or heavy vaginal bleeding.     ROS: Denies cardiovascular or respiratory symptoms    PAST MEDICAL & SURGICAL HISTORY:  DM (diabetes mellitus)  HTN (hypertension)  Hypercholesteremia  Uterine fibroid  Former smoker  Obesity, Class II, BMI 35-39.9  History of D&C  H/O myomectomy  S/P cholecystectomy    Physical Exam  Vital Signs Last 24 Hrs  T(F): 99.4 (18 Oct 2023 04:46), Max: 100.2 (18 Oct 2023 00:18)  HR: 103 (18 Oct 2023 04:46) (96 - 103)  BP: 133/60 (18 Oct 2023 04:46) (115/54 - 133/60)  RR: 18 (18 Oct 2023 04:46) (18 - 18)    Physical exam:  General - AAOx3, NAD  Abdomen:  - Soft, nontender, nondistended  - 4 laparoscopic incisions with staples, no drainage or erythema  - Vertical incision, dressing removed, staples in place, minimal serosanguinous drainage from bottom edge of incision, no surrounding erythema  Pelvis/Vagina - No bleeding  Extremities - No calf tenderness, no swelling    Labs:                        8.2    11.67 )-----------( 284      ( 17 Oct 2023 14:42 )             25.8                         8.7    13.82 )-----------( 291      ( 17 Oct 2023 05:20 )             27.4     136  |  101  |  20  ----------------------------<134  4.2  |  29  |  1.2    Magnesium: 1.5 mg/dL (10-17-23 @ 14:42)    Phosphorus: 3.6 mg/dL (10-17-23 @ 14:42)      Antibody Screen: NEG (10-17-23 @ 14:42)    IMAGING:  < from: US Kidney and Bladder (10.17.23 @ 18:38) >  ACC: [id number] EXAM:  US KIDNEYS AND BLADDER   ORDERED BY: LLOYD DOTY     PROCEDURE DATE:  10/17/2023          INTERPRETATION:  CLINICAL INFORMATION: Acute kidney injury    COMPARISON: None available.    TECHNIQUE: Sonography of the kidneys and bladder.    FINDINGS/  IMPRESSION:    Limited study.    Right kidney: 11.3 cm. No hydronephrosis.    Left kidney:  Poorly visualized.    Urinary bladder: Poorly visualized    --- End of Report ---    < end of copied text >

## 2023-10-18 NOTE — CONSULT NOTE ADULT - ASSESSMENT
63 y/o G0, PMHx T2DM, HTN, HLD, obesity s/p robotic hysterectomy converted to open, BS, left oophorectomy, absent right ovary, omentectomy for carcinosarcoma, EBL 1000, POD#2, recovering well. Nephrology was consulted for AGUSTO.     INCOMPLETE NEPHROLOGY CONSULTATION NOTE      Pre-renal AGUSTO in setting of recent surgery/ Hypovolemia   - S/p s/p robotic hysterectomy converted to open, BS, left oophorectomy. POD day 2   - Baseline Cr 0.8 which up-trended to 1.2  - Urine sodium less than 20 consistent with pre-renal  - C/w IV hydration at 75 ml/ hr LR for 24 hours and reassess fluid status   - K 4.2 and bicarbonate 29 ( GI loss)  - Replenish electrolytes   - BMP Q 12 hrly   - US kidney and bladder ruled out obstructive pathology   - Avoid nephrotoxic medications  - Renally dose antibiotics   - Nephrology will follow    61 y/o G0, PMHx T2DM, HTN, HLD, obesity s/p robotic hysterectomy converted to open, BS, left oophorectomy, absent right ovary, omentectomy for carcinosarcoma, EBL 1000, POD#2, recovering well. Nephrology was consulted for AGUSTO.     Pre-renal AGUSTO in setting of recent surgery/ Hypovolemia/ Poor oral intake   - S/p s/p robotic hysterectomy converted to open, BS, left oophorectomy. POD day 3  - Baseline Cr 0.8 which up-trended to 1.2  - Urine sodium less than 20 consistent with pre-renal  - C/w IV hydration at 75 ml/ hr LR for 24 hours and reassess fluid status   - K 4.2 and bicarbonate 29 ( GI loss)  - Stop hydrochlorothiazide   - Can use hydromorphone for pain management instead of morphine   - Replenish electrolytes   - BMP Q 12 hrly   - US kidney and bladder ruled out obstructive pathology   - Avoid nephrotoxic medications  - Renally dose antibiotics   - Nephrology will follow    61 y/o G0, PMHx T2DM, HTN, HLD, obesity s/p robotic hysterectomy converted to open, BS, left oophorectomy, absent right ovary, omentectomy for carcinosarcoma, EBL 1000, POD#2, recovering well. Nephrology was consulted for AGUSTO.     Pre-renal AGUSTO in setting of recent surgery/ Hypovolemia/ Poor oral intake   - S/p s/p robotic hysterectomy converted to open, BS, left oophorectomy. POD day 3  - Baseline Cr 0.8 which up-trended to 1.2  - Urine sodium less than 20 consistent with pre-renal  - C/w IV hydration at 75 ml/ hr LR for 24 hours and reassess fluid status   - K 4.2 and bicarbonate 29 ( GI loss)  - Stop hydrochlorothiazide   - BMP daily   - US kidney and bladder ruled out obstructive pathology   - Avoid nephrotoxic medications  - Renally dose antibiotics   - Nephrology will follow

## 2023-10-18 NOTE — CONSULT NOTE ADULT - ATTENDING COMMENTS
AGUSTO post op   likely intraop hemodynamic shifts + prerenal  hold HCTZ  Cr stable, good UOP  IVF as above

## 2023-10-18 NOTE — CONSULT NOTE ADULT - SUBJECTIVE AND OBJECTIVE BOX
INCOMPLETE NEPHROLOGY CONSULTATION NOTE    61 y/o G0, PMHx T2DM, HTN, HLD, obesity s/p robotic hysterectomy converted to open, BS, left oophorectomy, absent right ovary, omentectomy for carcinosarcoma, EBL 1000, POD#2, recovering well. Nephrology was consulted for AGUSTO.     PAST MEDICAL & SURGICAL HISTORY:  DM (diabetes mellitus)  HTN (hypertension)  Hypercholesteremia  Uterine fibroid  Former smoker  Obesity, Class II, BMI 35-39.9  History of D&C  H/O myomectomy  S/P cholecystectomy      Allergies:  No Known Allergies    Home Medications Reviewed  Hospital Medications:   MEDICATIONS  (STANDING):  acetaminophen     Tablet .. 1000 milliGRAM(s) Oral every 6 hours  amLODIPine   Tablet 10 milliGRAM(s) Oral daily  chlorhexidine 2% Cloths 1 Application(s) Topical daily  dextrose 5%. 1000 milliLiter(s) (50 mL/Hr) IV Continuous <Continuous>  dextrose 5%. 1000 milliLiter(s) (100 mL/Hr) IV Continuous <Continuous>  dextrose 50% Injectable 25 Gram(s) IV Push once  dextrose 50% Injectable 12.5 Gram(s) IV Push once  dextrose 50% Injectable 25 Gram(s) IV Push once  enoxaparin Injectable 40 milliGRAM(s) SubCutaneous every 24 hours  glucagon  Injectable 1 milliGRAM(s) IntraMuscular once  hydrochlorothiazide 12.5 milliGRAM(s) Oral daily  HYDROmorphone PCA (1 mG/mL) 30 milliLiter(s) PCA Continuous PCA Continuous  insulin lispro (ADMELOG) corrective regimen sliding scale   SubCutaneous three times a day before meals  insulin lispro (ADMELOG) corrective regimen sliding scale   SubCutaneous at bedtime  lactated ringers. 1000 milliLiter(s) (75 mL/Hr) IV Continuous <Continuous>  lisinopril 10 milliGRAM(s) Oral daily  metFORMIN 1000 milliGRAM(s) Oral every 12 hours  senna 2 Tablet(s) Oral at bedtime  simethicone 80 milliGRAM(s) Chew every 6 hours    SOCIAL HISTORY:  Denies ETOH,Smoking,   FAMILY HISTORY:  Family history of diabetes mellitus (DM) (Mother)        REVIEW OF SYSTEMS:  CONSTITUTIONAL: No weakness, fevers or chills  EYES/ENT: No visual changes;  No vertigo or throat pain   NECK: No pain or stiffness  RESPIRATORY: No cough, wheezing, hemoptysis; No shortness of breath  CARDIOVASCULAR: No chest pain or palpitations.  GASTROINTESTINAL: No abdominal or epigastric pain. No nausea, vomiting, or hematemesis; No diarrhea or constipation. No melena or hematochezia.  GENITOURINARY: No dysuria, frequency, foamy urine, urinary urgency, incontinence or hematuria  NEUROLOGICAL: No numbness or weakness  SKIN: No itching, burning, rashes, or lesions   VASCULAR: No bilateral lower extremity edema.   All other review of systems is negative unless indicated above.    VITALS:  Vital Signs Last 24 Hrs  T(C): 37.2 (18 Oct 2023 08:50), Max: 37.9 (18 Oct 2023 00:18)  T(F): 99 (18 Oct 2023 08:50), Max: 100.2 (18 Oct 2023 00:18)  HR: 101 (18 Oct 2023 08:50) (96 - 103)  BP: 132/60 (18 Oct 2023 08:50) (115/54 - 133/60)  BP(mean): --  RR: 18 (18 Oct 2023 08:50) (18 - 18)  SpO2: 95% (18 Oct 2023 08:50) (92% - 99%)    Parameters below as of 18 Oct 2023 08:50  Patient On (Oxygen Delivery Method): room air        10-17 @ 07:01  -  10-18 @ 07:00  --------------------------------------------------------  IN: 975 mL / OUT: 3375 mL / NET: -2400 mL    10-18 @ 07:01  -  10-18 @ 12:37  --------------------------------------------------------  IN: 220 mL / OUT: 0 mL / NET: 220 mL        PHYSICAL EXAM:  Constitutional: NAD  HEENT: anicteric sclera, oropharynx clear, MMM  Neck: No JVD  Respiratory: CTAB, no wheezes, rales or rhonchi  Cardiovascular: S1, S2, RRR  Gastrointestinal: BS+, soft, NT/ND  Extremities: No cyanosis or clubbing. No peripheral edema  Neurological: A/O x 3, no focal deficits  Psychiatric: Normal mood, normal affect  : No CVA tenderness. No ovalle.   Skin: No rashes  Vascular Access:    LABS:  10-18    144  |  104  |  17  ----------------------------<  129<H>  4.2   |  29  |  1.2    Ca    8.2<L>      18 Oct 2023 06:12  Phos  3.3     10-18  Mg     2.0     10-18      Creatinine Trend: 1.2 <--, 1.2 <--, 1.2 <--, 0.9 <--, 0.8 <--                        8.1    10.97 )-----------( 291      ( 18 Oct 2023 06:12 )             25.8     Urine Studies:  Urinalysis Basic - ( 18 Oct 2023 06:12 )    Color:  / Appearance:  / SG:  / pH:   Gluc: 129 mg/dL / Ketone:   / Bili:  / Urobili:    Blood:  / Protein:  / Nitrite:    Leuk Esterase:  / RBC:  / WBC    Sq Epi:  / Non Sq Epi:  / Bacteria:       Creatinine, Random Urine: 84 mg/dL (10-17 @ 16:30)  Sodium, Random Urine: <20.0 mmoL/L (10-17 @ 16:30)            RADIOLOGY & ADDITIONAL STUDIES:    RBUS    FINDINGS:    Right kidney: 12.0 cm. No hydronephrosis or calculi.    Left kidney: 11.2 cm. No hydronephrosis or calculi.    Urinary bladder: Ovalle catheter is in place.             NEPHROLOGY CONSULTATION NOTE    61 y/o G0, PMHx T2DM, HTN, HLD, obesity s/p robotic hysterectomy converted to open, BS, left oophorectomy, absent right ovary, omentectomy for carcinosarcoma, EBL 1000, POD#2, recovering well. Nephrology was consulted for AGUSTO.     PAST MEDICAL & SURGICAL HISTORY:  DM (diabetes mellitus)  HTN (hypertension)  Hypercholesteremia  Uterine fibroid  Former smoker  Obesity, Class II, BMI 35-39.9  History of D&C  H/O myomectomy  S/P cholecystectomy      Allergies:  No Known Allergies    Home Medications Reviewed  Hospital Medications:   MEDICATIONS  (STANDING):  acetaminophen     Tablet .. 1000 milliGRAM(s) Oral every 6 hours  amLODIPine   Tablet 10 milliGRAM(s) Oral daily  chlorhexidine 2% Cloths 1 Application(s) Topical daily  dextrose 5%. 1000 milliLiter(s) (50 mL/Hr) IV Continuous <Continuous>  dextrose 5%. 1000 milliLiter(s) (100 mL/Hr) IV Continuous <Continuous>  dextrose 50% Injectable 25 Gram(s) IV Push once  dextrose 50% Injectable 12.5 Gram(s) IV Push once  dextrose 50% Injectable 25 Gram(s) IV Push once  enoxaparin Injectable 40 milliGRAM(s) SubCutaneous every 24 hours  glucagon  Injectable 1 milliGRAM(s) IntraMuscular once  hydrochlorothiazide 12.5 milliGRAM(s) Oral daily  HYDROmorphone PCA (1 mG/mL) 30 milliLiter(s) PCA Continuous PCA Continuous  insulin lispro (ADMELOG) corrective regimen sliding scale   SubCutaneous three times a day before meals  insulin lispro (ADMELOG) corrective regimen sliding scale   SubCutaneous at bedtime  lactated ringers. 1000 milliLiter(s) (75 mL/Hr) IV Continuous <Continuous>  lisinopril 10 milliGRAM(s) Oral daily  metFORMIN 1000 milliGRAM(s) Oral every 12 hours  senna 2 Tablet(s) Oral at bedtime  simethicone 80 milliGRAM(s) Chew every 6 hours    SOCIAL HISTORY:  Denies ETOH,Smoking,   FAMILY HISTORY:  Family history of diabetes mellitus (DM) (Mother)        REVIEW OF SYSTEMS:    RESPIRATORY: No cough, wheezing, hemoptysis; No shortness of breath  CARDIOVASCULAR: No chest pain or palpitations.  GASTROINTESTINAL: Constipated   GENITOURINARY: No dysuria, frequency, foamy urine, urinary urgency, incontinence or hematuria  NEUROLOGICAL: No numbness or weakness  SKIN: No itching, burning, rashes, or lesions     VITALS:  Vital Signs Last 24 Hrs  T(C): 37.2 (18 Oct 2023 08:50), Max: 37.9 (18 Oct 2023 00:18)  T(F): 99 (18 Oct 2023 08:50), Max: 100.2 (18 Oct 2023 00:18)  HR: 101 (18 Oct 2023 08:50) (96 - 103)  BP: 132/60 (18 Oct 2023 08:50) (115/54 - 133/60)  BP(mean): --  RR: 18 (18 Oct 2023 08:50) (18 - 18)  SpO2: 95% (18 Oct 2023 08:50) (92% - 99%)    Parameters below as of 18 Oct 2023 08:50  Patient On (Oxygen Delivery Method): room air        10-17 @ 07:01  -  10-18 @ 07:00  --------------------------------------------------------  IN: 975 mL / OUT: 3375 mL / NET: -2400 mL    10-18 @ 07:01  -  10-18 @ 12:37  --------------------------------------------------------  IN: 220 mL / OUT: 0 mL / NET: 220 mL        PHYSICAL EXAM:    Respiratory: CTAB, no wheezes, rales or rhonchi  Cardiovascular: S1, S2, RRR  Gastrointestinal: BS+, soft, NT/ND  Extremities: No cyanosis or clubbing. No peripheral edema  : Hinojosa placed   Skin: No rashes      LABS:  10-18    144  |  104  |  17  ----------------------------<  129<H>  4.2   |  29  |  1.2    Ca    8.2<L>      18 Oct 2023 06:12  Phos  3.3     10-18  Mg     2.0     10-18      Creatinine Trend: 1.2 <--, 1.2 <--, 1.2 <--, 0.9 <--, 0.8 <--                        8.1    10.97 )-----------( 291      ( 18 Oct 2023 06:12 )             25.8     Urine Studies:  Urinalysis Basic - ( 18 Oct 2023 06:12 )    Color:  / Appearance:  / SG:  / pH:   Gluc: 129 mg/dL / Ketone:   / Bili:  / Urobili:    Blood:  / Protein:  / Nitrite:    Leuk Esterase:  / RBC:  / WBC    Sq Epi:  / Non Sq Epi:  / Bacteria:       Creatinine, Random Urine: 84 mg/dL (10-17 @ 16:30)  Sodium, Random Urine: <20.0 mmoL/L (10-17 @ 16:30)            RADIOLOGY & ADDITIONAL STUDIES:    RBUS    FINDINGS:    Right kidney: 12.0 cm. No hydronephrosis or calculi.    Left kidney: 11.2 cm. No hydronephrosis or calculi.    Urinary bladder: Hinojosa catheter is in place.

## 2023-10-18 NOTE — PROGRESS NOTE ADULT - ASSESSMENT
63 y/o G0, PMHx T2DM, HTN, HLD, obesity s/p robotic hysterectomy converted to open, BS, left oophorectomy, absent right ovary, omentectomy for carcinosarcoma, EBL 1000, POD#2, recovering well    - PCA pump for pain mgmt  - Lovenox 40mg QD for DVT ppx  - regular diet  - vitals q4  - incentive spirometry   - f/u AM labs  - c/w metformin, amlodipine hydrochlorothiazide   - FS ACQHS with SSI  - Trend creatinine

## 2023-10-19 LAB
ANION GAP SERPL CALC-SCNC: 10 MMOL/L — SIGNIFICANT CHANGE UP (ref 7–14)
ANION GAP SERPL CALC-SCNC: 10 MMOL/L — SIGNIFICANT CHANGE UP (ref 7–14)
BASOPHILS # BLD AUTO: 0.03 K/UL — SIGNIFICANT CHANGE UP (ref 0–0.2)
BASOPHILS # BLD AUTO: 0.03 K/UL — SIGNIFICANT CHANGE UP (ref 0–0.2)
BASOPHILS NFR BLD AUTO: 0.2 % — SIGNIFICANT CHANGE UP (ref 0–1)
BASOPHILS NFR BLD AUTO: 0.2 % — SIGNIFICANT CHANGE UP (ref 0–1)
BUN SERPL-MCNC: 15 MG/DL — SIGNIFICANT CHANGE UP (ref 10–20)
BUN SERPL-MCNC: 15 MG/DL — SIGNIFICANT CHANGE UP (ref 10–20)
CALCIUM SERPL-MCNC: 8.3 MG/DL — LOW (ref 8.4–10.5)
CALCIUM SERPL-MCNC: 8.3 MG/DL — LOW (ref 8.4–10.5)
CHLORIDE SERPL-SCNC: 101 MMOL/L — SIGNIFICANT CHANGE UP (ref 98–110)
CHLORIDE SERPL-SCNC: 101 MMOL/L — SIGNIFICANT CHANGE UP (ref 98–110)
CO2 SERPL-SCNC: 29 MMOL/L — SIGNIFICANT CHANGE UP (ref 17–32)
CO2 SERPL-SCNC: 29 MMOL/L — SIGNIFICANT CHANGE UP (ref 17–32)
CREAT SERPL-MCNC: 1.1 MG/DL — SIGNIFICANT CHANGE UP (ref 0.7–1.5)
CREAT SERPL-MCNC: 1.1 MG/DL — SIGNIFICANT CHANGE UP (ref 0.7–1.5)
EGFR: 57 ML/MIN/1.73M2 — LOW
EGFR: 57 ML/MIN/1.73M2 — LOW
EOSINOPHIL # BLD AUTO: 0.84 K/UL — HIGH (ref 0–0.7)
EOSINOPHIL # BLD AUTO: 0.84 K/UL — HIGH (ref 0–0.7)
EOSINOPHIL NFR BLD AUTO: 6.5 % — SIGNIFICANT CHANGE UP (ref 0–8)
EOSINOPHIL NFR BLD AUTO: 6.5 % — SIGNIFICANT CHANGE UP (ref 0–8)
GLUCOSE BLDC GLUCOMTR-MCNC: 113 MG/DL — HIGH (ref 70–99)
GLUCOSE BLDC GLUCOMTR-MCNC: 113 MG/DL — HIGH (ref 70–99)
GLUCOSE BLDC GLUCOMTR-MCNC: 123 MG/DL — HIGH (ref 70–99)
GLUCOSE BLDC GLUCOMTR-MCNC: 126 MG/DL — HIGH (ref 70–99)
GLUCOSE BLDC GLUCOMTR-MCNC: 126 MG/DL — HIGH (ref 70–99)
GLUCOSE SERPL-MCNC: 111 MG/DL — HIGH (ref 70–99)
GLUCOSE SERPL-MCNC: 111 MG/DL — HIGH (ref 70–99)
HCT VFR BLD CALC: 26.5 % — LOW (ref 37–47)
HCT VFR BLD CALC: 26.5 % — LOW (ref 37–47)
HGB BLD-MCNC: 8.4 G/DL — LOW (ref 12–16)
HGB BLD-MCNC: 8.4 G/DL — LOW (ref 12–16)
IMM GRANULOCYTES NFR BLD AUTO: 0.5 % — HIGH (ref 0.1–0.3)
IMM GRANULOCYTES NFR BLD AUTO: 0.5 % — HIGH (ref 0.1–0.3)
LYMPHOCYTES # BLD AUTO: 1.27 K/UL — SIGNIFICANT CHANGE UP (ref 1.2–3.4)
LYMPHOCYTES # BLD AUTO: 1.27 K/UL — SIGNIFICANT CHANGE UP (ref 1.2–3.4)
LYMPHOCYTES # BLD AUTO: 9.8 % — LOW (ref 20.5–51.1)
LYMPHOCYTES # BLD AUTO: 9.8 % — LOW (ref 20.5–51.1)
MAGNESIUM SERPL-MCNC: 2 MG/DL — SIGNIFICANT CHANGE UP (ref 1.8–2.4)
MAGNESIUM SERPL-MCNC: 2 MG/DL — SIGNIFICANT CHANGE UP (ref 1.8–2.4)
MCHC RBC-ENTMCNC: 26.6 PG — LOW (ref 27–31)
MCHC RBC-ENTMCNC: 26.6 PG — LOW (ref 27–31)
MCHC RBC-ENTMCNC: 31.7 G/DL — LOW (ref 32–37)
MCHC RBC-ENTMCNC: 31.7 G/DL — LOW (ref 32–37)
MCV RBC AUTO: 83.9 FL — SIGNIFICANT CHANGE UP (ref 81–99)
MCV RBC AUTO: 83.9 FL — SIGNIFICANT CHANGE UP (ref 81–99)
MONOCYTES # BLD AUTO: 1.09 K/UL — HIGH (ref 0.1–0.6)
MONOCYTES # BLD AUTO: 1.09 K/UL — HIGH (ref 0.1–0.6)
MONOCYTES NFR BLD AUTO: 8.4 % — SIGNIFICANT CHANGE UP (ref 1.7–9.3)
MONOCYTES NFR BLD AUTO: 8.4 % — SIGNIFICANT CHANGE UP (ref 1.7–9.3)
NEUTROPHILS # BLD AUTO: 9.71 K/UL — HIGH (ref 1.4–6.5)
NEUTROPHILS # BLD AUTO: 9.71 K/UL — HIGH (ref 1.4–6.5)
NEUTROPHILS NFR BLD AUTO: 74.6 % — SIGNIFICANT CHANGE UP (ref 42.2–75.2)
NEUTROPHILS NFR BLD AUTO: 74.6 % — SIGNIFICANT CHANGE UP (ref 42.2–75.2)
NON-GYNECOLOGICAL CYTOLOGY STUDY: SIGNIFICANT CHANGE UP
NON-GYNECOLOGICAL CYTOLOGY STUDY: SIGNIFICANT CHANGE UP
NRBC # BLD: 0 /100 WBCS — SIGNIFICANT CHANGE UP (ref 0–0)
NRBC # BLD: 0 /100 WBCS — SIGNIFICANT CHANGE UP (ref 0–0)
PHOSPHATE SERPL-MCNC: 3.2 MG/DL — SIGNIFICANT CHANGE UP (ref 2.1–4.9)
PHOSPHATE SERPL-MCNC: 3.2 MG/DL — SIGNIFICANT CHANGE UP (ref 2.1–4.9)
PLATELET # BLD AUTO: 324 K/UL — SIGNIFICANT CHANGE UP (ref 130–400)
PLATELET # BLD AUTO: 324 K/UL — SIGNIFICANT CHANGE UP (ref 130–400)
PMV BLD: 11.7 FL — HIGH (ref 7.4–10.4)
PMV BLD: 11.7 FL — HIGH (ref 7.4–10.4)
POTASSIUM SERPL-MCNC: 4.3 MMOL/L — SIGNIFICANT CHANGE UP (ref 3.5–5)
POTASSIUM SERPL-MCNC: 4.3 MMOL/L — SIGNIFICANT CHANGE UP (ref 3.5–5)
POTASSIUM SERPL-SCNC: 4.3 MMOL/L — SIGNIFICANT CHANGE UP (ref 3.5–5)
POTASSIUM SERPL-SCNC: 4.3 MMOL/L — SIGNIFICANT CHANGE UP (ref 3.5–5)
RBC # BLD: 3.16 M/UL — LOW (ref 4.2–5.4)
RBC # BLD: 3.16 M/UL — LOW (ref 4.2–5.4)
RBC # FLD: 13.7 % — SIGNIFICANT CHANGE UP (ref 11.5–14.5)
RBC # FLD: 13.7 % — SIGNIFICANT CHANGE UP (ref 11.5–14.5)
SODIUM SERPL-SCNC: 140 MMOL/L — SIGNIFICANT CHANGE UP (ref 135–146)
SODIUM SERPL-SCNC: 140 MMOL/L — SIGNIFICANT CHANGE UP (ref 135–146)
WBC # BLD: 13.01 K/UL — HIGH (ref 4.8–10.8)
WBC # BLD: 13.01 K/UL — HIGH (ref 4.8–10.8)
WBC # FLD AUTO: 13.01 K/UL — HIGH (ref 4.8–10.8)
WBC # FLD AUTO: 13.01 K/UL — HIGH (ref 4.8–10.8)

## 2023-10-19 RX ADMIN — SIMETHICONE 80 MILLIGRAM(S): 80 TABLET, CHEWABLE ORAL at 17:46

## 2023-10-19 RX ADMIN — SODIUM CHLORIDE 75 MILLILITER(S): 9 INJECTION, SOLUTION INTRAVENOUS at 05:31

## 2023-10-19 RX ADMIN — ENOXAPARIN SODIUM 40 MILLIGRAM(S): 100 INJECTION SUBCUTANEOUS at 12:16

## 2023-10-19 RX ADMIN — Medication 1000 MILLIGRAM(S): at 17:26

## 2023-10-19 RX ADMIN — Medication 1000 MILLIGRAM(S): at 17:05

## 2023-10-19 RX ADMIN — METFORMIN HYDROCHLORIDE 1000 MILLIGRAM(S): 850 TABLET ORAL at 17:46

## 2023-10-19 RX ADMIN — METFORMIN HYDROCHLORIDE 1000 MILLIGRAM(S): 850 TABLET ORAL at 05:31

## 2023-10-19 RX ADMIN — Medication 1000 MILLIGRAM(S): at 12:27

## 2023-10-19 RX ADMIN — Medication 1000 MILLIGRAM(S): at 23:16

## 2023-10-19 RX ADMIN — LISINOPRIL 10 MILLIGRAM(S): 2.5 TABLET ORAL at 05:31

## 2023-10-19 RX ADMIN — Medication 1000 MILLIGRAM(S): at 23:18

## 2023-10-19 RX ADMIN — SIMETHICONE 80 MILLIGRAM(S): 80 TABLET, CHEWABLE ORAL at 05:31

## 2023-10-19 RX ADMIN — SIMETHICONE 80 MILLIGRAM(S): 80 TABLET, CHEWABLE ORAL at 23:16

## 2023-10-19 RX ADMIN — CHLORHEXIDINE GLUCONATE 1 APPLICATION(S): 213 SOLUTION TOPICAL at 12:15

## 2023-10-19 RX ADMIN — AMLODIPINE BESYLATE 10 MILLIGRAM(S): 2.5 TABLET ORAL at 05:31

## 2023-10-19 RX ADMIN — Medication 1000 MILLIGRAM(S): at 12:15

## 2023-10-19 RX ADMIN — SENNA PLUS 2 TABLET(S): 8.6 TABLET ORAL at 21:09

## 2023-10-19 RX ADMIN — SIMETHICONE 80 MILLIGRAM(S): 80 TABLET, CHEWABLE ORAL at 12:15

## 2023-10-19 NOTE — PHYSICAL THERAPY INITIAL EVALUATION ADULT - NSACTIVITYREC_GEN_A_PT
Pt educated on continued ambulation with nursing staff throughout the day. Pt educated on seated ther ex, including ankle pumps, knee ext.

## 2023-10-19 NOTE — PROGRESS NOTE ADULT - SUBJECTIVE AND OBJECTIVE BOX
Nephrology progress note    THIS IS AN INCOMPLETE NOTE . FULL NOTE TO FOLLOW SHORTLY    Patient is seen and examined, events over the last 24 h noted .    Allergies:  No Known Allergies    Hospital Medications:   MEDICATIONS  (STANDING):  acetaminophen     Tablet .. 1000 milliGRAM(s) Oral every 6 hours  amLODIPine   Tablet 10 milliGRAM(s) Oral daily  chlorhexidine 2% Cloths 1 Application(s) Topical daily  dextrose 5%. 1000 milliLiter(s) (50 mL/Hr) IV Continuous <Continuous>  dextrose 5%. 1000 milliLiter(s) (100 mL/Hr) IV Continuous <Continuous>  dextrose 50% Injectable 25 Gram(s) IV Push once  dextrose 50% Injectable 12.5 Gram(s) IV Push once  dextrose 50% Injectable 25 Gram(s) IV Push once  enoxaparin Injectable 40 milliGRAM(s) SubCutaneous every 24 hours  glucagon  Injectable 1 milliGRAM(s) IntraMuscular once  HYDROmorphone PCA (1 mG/mL) 30 milliLiter(s) PCA Continuous PCA Continuous  insulin lispro (ADMELOG) corrective regimen sliding scale   SubCutaneous three times a day before meals  insulin lispro (ADMELOG) corrective regimen sliding scale   SubCutaneous at bedtime  lactated ringers. 1000 milliLiter(s) (75 mL/Hr) IV Continuous <Continuous>  lisinopril 10 milliGRAM(s) Oral daily  metFORMIN 1000 milliGRAM(s) Oral every 12 hours  senna 2 Tablet(s) Oral at bedtime  simethicone 80 milliGRAM(s) Chew every 6 hours        VITALS:  T(F): 97.5 (10-19-23 @ 04:35), Max: 99.2 (10-18-23 @ 21:09)  HR: 92 (10-19-23 @ 04:35)  BP: 124/58 (10-19-23 @ 04:35)  RR: 18 (10-19-23 @ 04:35)  SpO2: 98% (10-19-23 @ 04:35)  Wt(kg): --    10-17 @ 07:01  -  10-18 @ 07:00  --------------------------------------------------------  IN: 975 mL / OUT: 3375 mL / NET: -2400 mL    10-18 @ 07:01  -  10-19 @ 07:00  --------------------------------------------------------  IN: 220 mL / OUT: 1400 mL / NET: -1180 mL          PHYSICAL EXAM:  Constitutional: NAD  HEENT: anicteric sclera, oropharynx clear, MMM  Neck: No JVD  Respiratory: CTAB, no wheezes, rales or rhonchi  Cardiovascular: S1, S2, RRR  Gastrointestinal: BS+, soft, NT/ND  Extremities: No cyanosis or clubbing. No peripheral edema  :  No ovalle.   Skin: No rashes    LABS:  10-19    140  |  101  |  15  ----------------------------<  111<H>  4.3   |  29  |  1.1    Ca    8.3<L>      19 Oct 2023 05:46  Phos  3.2     10-19  Mg     2.0     10-19                            8.4    13.01 )-----------( 324      ( 19 Oct 2023 05:46 )             26.5       Urine Studies:  Urinalysis Basic - ( 19 Oct 2023 05:46 )    Color:  / Appearance:  / SG:  / pH:   Gluc: 111 mg/dL / Ketone:   / Bili:  / Urobili:    Blood:  / Protein:  / Nitrite:    Leuk Esterase:  / RBC:  / WBC    Sq Epi:  / Non Sq Epi:  / Bacteria:       Creatinine, Random Urine: 84 mg/dL (10-17 @ 16:30)  Sodium, Random Urine: <20.0 mmoL/L (10-17 @ 16:30)              RADIOLOGY & ADDITIONAL STUDIES:   Nephrology progress note    Patient is seen and examined, events over the last 24 h noted .  Lying in bed comfortable   ovalle out    Allergies:  No Known Allergies    Hospital Medications:   MEDICATIONS  (STANDING):  acetaminophen     Tablet .. 1000 milliGRAM(s) Oral every 6 hours  amLODIPine   Tablet 10 milliGRAM(s) Oral daily  enoxaparin Injectable 40 milliGRAM(s) SubCutaneous every 24 hours  glucagon  Injectable 1 milliGRAM(s) IntraMuscular once  HYDROmorphone PCA (1 mG/mL) 30 milliLiter(s) PCA Continuous PCA Continuous  insulin lispro (ADMELOG) corrective regimen sliding scale   SubCutaneous three times a day before meals  insulin lispro (ADMELOG) corrective regimen sliding scale   SubCutaneous at bedtime  lactated ringers. 1000 milliLiter(s) (75 mL/Hr) IV Continuous <Continuous>  lisinopril 10 milliGRAM(s) Oral daily  metFORMIN 1000 milliGRAM(s) Oral every 12 hours  senna 2 Tablet(s) Oral at bedtime  simethicone 80 milliGRAM(s) Chew every 6 hours        VITALS:  T(F): 97.5 (10-19-23 @ 04:35), Max: 99.2 (10-18-23 @ 21:09)  HR: 92 (10-19-23 @ 04:35)  BP: 124/58 (10-19-23 @ 04:35)  RR: 18 (10-19-23 @ 04:35)  SpO2: 98% (10-19-23 @ 04:35)      10-17 @ 07:01  -  10-18 @ 07:00  --------------------------------------------------------  IN: 975 mL / OUT: 3375 mL / NET: -2400 mL    10-18 @ 07:01  -  10-19 @ 07:00  --------------------------------------------------------  IN: 220 mL / OUT: 1400 mL / NET: -1180 mL          PHYSICAL EXAM:  Constitutional: NAD  Respiratory: CTAB,  Cardiovascular: S1, S2, RRR  Gastrointestinal: BS+, soft, NT/ND  Extremities: No cyanosis or clubbing. No peripheral edema  :  No ovalle.   Skin: No rashes    LABS:  10-19    140  |  101  |  15  ----------------------------<  111<H>  4.3   |  29  |  1.1    Ca    8.3<L>      19 Oct 2023 05:46  Phos  3.2     10-19  Mg     2.0     10-19                            8.4    13.01 )-----------( 324      ( 19 Oct 2023 05:46 )             26.5       Urine Studies:  Urinalysis Basic - ( 19 Oct 2023 05:46 )    Color:  / Appearance:  / SG:  / pH:   Gluc: 111 mg/dL / Ketone:   / Bili:  / Urobili:    Blood:  / Protein:  / Nitrite:    Leuk Esterase:  / RBC:  / WBC    Sq Epi:  / Non Sq Epi:  / Bacteria:       Creatinine, Random Urine: 84 mg/dL (10-17 @ 16:30)  Sodium, Random Urine: <20.0 mmoL/L (10-17 @ 16:30)              RADIOLOGY & ADDITIONAL STUDIES:

## 2023-10-19 NOTE — PHYSICAL THERAPY INITIAL EVALUATION ADULT - GAIT TRAINING, PT EVAL
Goal: 150' with RW mod I by D/C. Stair Training Goal: 11 steps with appropriate HR with supervision by D/C

## 2023-10-19 NOTE — PROGRESS NOTE ADULT - ATTENDING COMMENTS
POD1 s/p Ro converted to open MRH/BSO/omentectomy/tumor debulking.  Discussed with patient the findings at the time of surgery.  Final results still pending.  OOB, maintain Hinojosa till UO picks up.  Monitor HB and CR levels.
POD3 s/p MRH/BSO/omentectomy/tumor debulking  Doing well, Cr slightly decreased, renal consult appreciated.  Dc vasquez, PT consult, OOB
POD2 s/p MRH/BSO/omentectomy/tumor debulking.  Doing well, but still only tolerating clears.  Cr still elevated, renal US is negative.  OOB, will dc catheter after renal evaluation

## 2023-10-19 NOTE — PROGRESS NOTE ADULT - ASSESSMENT
63 y/o G0, PMHx T2DM, HTN, HLD, obesity s/p robotic hysterectomy converted to open, BS, left oophorectomy, absent right ovary, omentectomy for carcinosarcoma, EBL 1000, POD#2, recovering well. Nephrology was consulted for AGUSTO.     Pre-renal AGUSTO in setting of recent surgery/ Hypovolemia/ Poor oral intake   - S/p s/p robotic hysterectomy converted to open, BS, left oophorectomy. POD day 3  - Baseline Cr 0.8 which up-trended to 1.2/ downtrending today   - C/w IV hydration at 75 ml/ hr LR if no good po intake / if tolerating po can DC IVF   - Keep HCTZ on hold now and on DC   - BMP daily   - US kidney and bladder ruled out obstructive pathology   - Avoid nephrotoxic medications  -  will sign off recall PRN / call using TEAMS or on [phone]

## 2023-10-19 NOTE — PROGRESS NOTE ADULT - SUBJECTIVE AND OBJECTIVE BOX
Chief Complaint: S/p robotic hysterectomy converted open with BS, left oophorectomy, absent right ovary    HPI: Pt is doing well, started to ambulate to chair yesterday. She complaints of decreased appetite, however denies any nausea or vomiting. Has not passed flatus. Reports improvemen tof pain. Denies fever, chills, SOB, chest pain, dizziness, flank pain, severe abdominal pain or heavy vaginal bleeding.     ROS: Denies cardiovascular or respiratory symptoms    PAST MEDICAL & SURGICAL HISTORY:  DM (diabetes mellitus)  HTN (hypertension)  Hypercholesteremia  Uterine fibroid  Former smoker  Obesity, Class II, BMI 35-39.9  History of D&C  H/O myomectomy  S/P cholecystectomy    Physical Exam  Vital Signs Last 24 Hrs  T(F): 97.5 (19 Oct 2023 04:35), Max: 99.2 (18 Oct 2023 21:09)  HR: 92 (19 Oct 2023 04:35) (92 - 101)  BP: 124/58 (19 Oct 2023 04:35) (124/58 - 155/68)  RR: 18 (19 Oct 2023 04:35) (18 - 18)    Physical exam:  General - AAOx3, NAD  Abdomen:  - Soft, nontender, nondistended  - 4 laparoscopic incisions with staples, no drainage or erythema  - Vertical incision, dressing removed, staples in place, no drainage, no surrounding erythema  Pelvis/Vagina - No bleeding  Extremities - No calf tenderness, no swelling    Labs:                        8.1    10.97 )-----------( 291      ( 18 Oct 2023 06:12 )             25.8                         8.2    11.67 )-----------( 284      ( 17 Oct 2023 14:42 )             25.8             Antibody Screen: NEG (10-17-23 @ 14:42)    IMAGING:  < from: US Kidney and Bladder (10.18.23 @ 12:22) >  ACC: [id number] EXAM:  US KIDNEYS AND BLADDER   ORDERED BY: ADALBERTO NGUYEN     PROCEDURE DATE:  10/18/2023          INTERPRETATION:  CLINICAL INFORMATION: Elevated creatinine,   hydronephrosis. Status post total abdominal hysterectomy-bilateral  salpingo-oophorectomy postoperative day #2    COMPARISON: 10/17/2023    TECHNIQUE: Sonography of the kidneys and bladder.    FINDINGS:    Right kidney: 12.0 cm. No hydronephrosis or calculi.    Left kidney:  11.2 cm. No hydronephrosis or calculi.    Urinary bladder: Hinojosa catheter is in place.        IMPRESSION:    No hydronephrosis or calculi.      < end of copied text >

## 2023-10-19 NOTE — PROGRESS NOTE ADULT - ASSESSMENT
63 y/o G0, PMHx T2DM, HTN, HLD, obesity s/p robotic hysterectomy converted to open, BS, left oophorectomy, absent right ovary, omentectomy for carcinosarcoma, EBL 1000, POD#2, recovering well    - PCA pump for pain mgmt  - Lovenox 40mg QD for DVT ppx  - regular diet  - vitals q4  - incentive spirometry   - Encourage ambulation  - FS ACQHS with SSI  - Trend creatinine, stable  - Repeat sonogram: normal  - Nephrology consult: Likely pre-renal, continue 75ml/hr IV fluid, monitor I/O, d/c hydrochlorothiazide, daily BMP  - Continue with home meds, HCTZ d/c'ed  - F/u AM labs

## 2023-10-20 LAB
ANION GAP SERPL CALC-SCNC: 8 MMOL/L — SIGNIFICANT CHANGE UP (ref 7–14)
ANION GAP SERPL CALC-SCNC: 8 MMOL/L — SIGNIFICANT CHANGE UP (ref 7–14)
BASOPHILS # BLD AUTO: 0.03 K/UL — SIGNIFICANT CHANGE UP (ref 0–0.2)
BASOPHILS # BLD AUTO: 0.03 K/UL — SIGNIFICANT CHANGE UP (ref 0–0.2)
BASOPHILS NFR BLD AUTO: 0.2 % — SIGNIFICANT CHANGE UP (ref 0–1)
BASOPHILS NFR BLD AUTO: 0.2 % — SIGNIFICANT CHANGE UP (ref 0–1)
BUN SERPL-MCNC: 15 MG/DL — SIGNIFICANT CHANGE UP (ref 10–20)
BUN SERPL-MCNC: 15 MG/DL — SIGNIFICANT CHANGE UP (ref 10–20)
CALCIUM SERPL-MCNC: 8.3 MG/DL — LOW (ref 8.4–10.4)
CALCIUM SERPL-MCNC: 8.3 MG/DL — LOW (ref 8.4–10.4)
CHLORIDE SERPL-SCNC: 101 MMOL/L — SIGNIFICANT CHANGE UP (ref 98–110)
CHLORIDE SERPL-SCNC: 101 MMOL/L — SIGNIFICANT CHANGE UP (ref 98–110)
CO2 SERPL-SCNC: 28 MMOL/L — SIGNIFICANT CHANGE UP (ref 17–32)
CO2 SERPL-SCNC: 28 MMOL/L — SIGNIFICANT CHANGE UP (ref 17–32)
CREAT SERPL-MCNC: 1 MG/DL — SIGNIFICANT CHANGE UP (ref 0.7–1.5)
CREAT SERPL-MCNC: 1 MG/DL — SIGNIFICANT CHANGE UP (ref 0.7–1.5)
EGFR: 64 ML/MIN/1.73M2 — SIGNIFICANT CHANGE UP
EGFR: 64 ML/MIN/1.73M2 — SIGNIFICANT CHANGE UP
EOSINOPHIL # BLD AUTO: 1 K/UL — HIGH (ref 0–0.7)
EOSINOPHIL # BLD AUTO: 1 K/UL — HIGH (ref 0–0.7)
EOSINOPHIL NFR BLD AUTO: 7.8 % — SIGNIFICANT CHANGE UP (ref 0–8)
EOSINOPHIL NFR BLD AUTO: 7.8 % — SIGNIFICANT CHANGE UP (ref 0–8)
GLUCOSE BLDC GLUCOMTR-MCNC: 107 MG/DL — HIGH (ref 70–99)
GLUCOSE BLDC GLUCOMTR-MCNC: 107 MG/DL — HIGH (ref 70–99)
GLUCOSE BLDC GLUCOMTR-MCNC: 112 MG/DL — HIGH (ref 70–99)
GLUCOSE BLDC GLUCOMTR-MCNC: 112 MG/DL — HIGH (ref 70–99)
GLUCOSE BLDC GLUCOMTR-MCNC: 123 MG/DL — HIGH (ref 70–99)
GLUCOSE BLDC GLUCOMTR-MCNC: 123 MG/DL — HIGH (ref 70–99)
GLUCOSE BLDC GLUCOMTR-MCNC: 127 MG/DL — HIGH (ref 70–99)
GLUCOSE BLDC GLUCOMTR-MCNC: 127 MG/DL — HIGH (ref 70–99)
GLUCOSE SERPL-MCNC: 103 MG/DL — HIGH (ref 70–99)
GLUCOSE SERPL-MCNC: 103 MG/DL — HIGH (ref 70–99)
HCT VFR BLD CALC: 25.5 % — LOW (ref 37–47)
HCT VFR BLD CALC: 25.5 % — LOW (ref 37–47)
HGB BLD-MCNC: 7.9 G/DL — LOW (ref 12–16)
HGB BLD-MCNC: 7.9 G/DL — LOW (ref 12–16)
IMM GRANULOCYTES NFR BLD AUTO: 0.5 % — HIGH (ref 0.1–0.3)
IMM GRANULOCYTES NFR BLD AUTO: 0.5 % — HIGH (ref 0.1–0.3)
LYMPHOCYTES # BLD AUTO: 1.44 K/UL — SIGNIFICANT CHANGE UP (ref 1.2–3.4)
LYMPHOCYTES # BLD AUTO: 1.44 K/UL — SIGNIFICANT CHANGE UP (ref 1.2–3.4)
LYMPHOCYTES # BLD AUTO: 11.3 % — LOW (ref 20.5–51.1)
LYMPHOCYTES # BLD AUTO: 11.3 % — LOW (ref 20.5–51.1)
MAGNESIUM SERPL-MCNC: 1.9 MG/DL — SIGNIFICANT CHANGE UP (ref 1.8–2.4)
MAGNESIUM SERPL-MCNC: 1.9 MG/DL — SIGNIFICANT CHANGE UP (ref 1.8–2.4)
MCHC RBC-ENTMCNC: 25.8 PG — LOW (ref 27–31)
MCHC RBC-ENTMCNC: 25.8 PG — LOW (ref 27–31)
MCHC RBC-ENTMCNC: 31 G/DL — LOW (ref 32–37)
MCHC RBC-ENTMCNC: 31 G/DL — LOW (ref 32–37)
MCV RBC AUTO: 83.3 FL — SIGNIFICANT CHANGE UP (ref 81–99)
MCV RBC AUTO: 83.3 FL — SIGNIFICANT CHANGE UP (ref 81–99)
MONOCYTES # BLD AUTO: 1.07 K/UL — HIGH (ref 0.1–0.6)
MONOCYTES # BLD AUTO: 1.07 K/UL — HIGH (ref 0.1–0.6)
MONOCYTES NFR BLD AUTO: 8.4 % — SIGNIFICANT CHANGE UP (ref 1.7–9.3)
MONOCYTES NFR BLD AUTO: 8.4 % — SIGNIFICANT CHANGE UP (ref 1.7–9.3)
NEUTROPHILS # BLD AUTO: 9.16 K/UL — HIGH (ref 1.4–6.5)
NEUTROPHILS # BLD AUTO: 9.16 K/UL — HIGH (ref 1.4–6.5)
NEUTROPHILS NFR BLD AUTO: 71.8 % — SIGNIFICANT CHANGE UP (ref 42.2–75.2)
NEUTROPHILS NFR BLD AUTO: 71.8 % — SIGNIFICANT CHANGE UP (ref 42.2–75.2)
NRBC # BLD: 0 /100 WBCS — SIGNIFICANT CHANGE UP (ref 0–0)
NRBC # BLD: 0 /100 WBCS — SIGNIFICANT CHANGE UP (ref 0–0)
PHOSPHATE SERPL-MCNC: 3 MG/DL — SIGNIFICANT CHANGE UP (ref 2.1–4.9)
PHOSPHATE SERPL-MCNC: 3 MG/DL — SIGNIFICANT CHANGE UP (ref 2.1–4.9)
PLATELET # BLD AUTO: 363 K/UL — SIGNIFICANT CHANGE UP (ref 130–400)
PLATELET # BLD AUTO: 363 K/UL — SIGNIFICANT CHANGE UP (ref 130–400)
PMV BLD: 11.4 FL — HIGH (ref 7.4–10.4)
PMV BLD: 11.4 FL — HIGH (ref 7.4–10.4)
POTASSIUM SERPL-MCNC: 4.4 MMOL/L — SIGNIFICANT CHANGE UP (ref 3.5–5)
POTASSIUM SERPL-MCNC: 4.4 MMOL/L — SIGNIFICANT CHANGE UP (ref 3.5–5)
POTASSIUM SERPL-SCNC: 4.4 MMOL/L — SIGNIFICANT CHANGE UP (ref 3.5–5)
POTASSIUM SERPL-SCNC: 4.4 MMOL/L — SIGNIFICANT CHANGE UP (ref 3.5–5)
RBC # BLD: 3.06 M/UL — LOW (ref 4.2–5.4)
RBC # BLD: 3.06 M/UL — LOW (ref 4.2–5.4)
RBC # FLD: 13.4 % — SIGNIFICANT CHANGE UP (ref 11.5–14.5)
RBC # FLD: 13.4 % — SIGNIFICANT CHANGE UP (ref 11.5–14.5)
SODIUM SERPL-SCNC: 137 MMOL/L — SIGNIFICANT CHANGE UP (ref 135–146)
SODIUM SERPL-SCNC: 137 MMOL/L — SIGNIFICANT CHANGE UP (ref 135–146)
WBC # BLD: 12.76 K/UL — HIGH (ref 4.8–10.8)
WBC # BLD: 12.76 K/UL — HIGH (ref 4.8–10.8)
WBC # FLD AUTO: 12.76 K/UL — HIGH (ref 4.8–10.8)
WBC # FLD AUTO: 12.76 K/UL — HIGH (ref 4.8–10.8)

## 2023-10-20 RX ORDER — APIXABAN 2.5 MG/1
2.5 TABLET, FILM COATED ORAL EVERY 12 HOURS
Refills: 0 | Status: DISCONTINUED | OUTPATIENT
Start: 2023-10-20 | End: 2023-10-21

## 2023-10-20 RX ORDER — APIXABAN 2.5 MG/1
1 TABLET, FILM COATED ORAL
Qty: 60 | Refills: 0
Start: 2023-10-20 | End: 2023-11-18

## 2023-10-20 RX ADMIN — SIMETHICONE 80 MILLIGRAM(S): 80 TABLET, CHEWABLE ORAL at 23:24

## 2023-10-20 RX ADMIN — AMLODIPINE BESYLATE 10 MILLIGRAM(S): 2.5 TABLET ORAL at 05:10

## 2023-10-20 RX ADMIN — LISINOPRIL 10 MILLIGRAM(S): 2.5 TABLET ORAL at 05:10

## 2023-10-20 RX ADMIN — Medication 1000 MILLIGRAM(S): at 18:44

## 2023-10-20 RX ADMIN — Medication 1000 MILLIGRAM(S): at 05:17

## 2023-10-20 RX ADMIN — SIMETHICONE 80 MILLIGRAM(S): 80 TABLET, CHEWABLE ORAL at 18:43

## 2023-10-20 RX ADMIN — SIMETHICONE 80 MILLIGRAM(S): 80 TABLET, CHEWABLE ORAL at 11:49

## 2023-10-20 RX ADMIN — METFORMIN HYDROCHLORIDE 1000 MILLIGRAM(S): 850 TABLET ORAL at 09:12

## 2023-10-20 RX ADMIN — Medication 1000 MILLIGRAM(S): at 05:11

## 2023-10-20 RX ADMIN — METFORMIN HYDROCHLORIDE 1000 MILLIGRAM(S): 850 TABLET ORAL at 18:43

## 2023-10-20 RX ADMIN — Medication 1000 MILLIGRAM(S): at 12:45

## 2023-10-20 RX ADMIN — APIXABAN 2.5 MILLIGRAM(S): 2.5 TABLET, FILM COATED ORAL at 18:44

## 2023-10-20 RX ADMIN — SIMETHICONE 80 MILLIGRAM(S): 80 TABLET, CHEWABLE ORAL at 05:11

## 2023-10-20 RX ADMIN — CHLORHEXIDINE GLUCONATE 1 APPLICATION(S): 213 SOLUTION TOPICAL at 11:49

## 2023-10-20 RX ADMIN — Medication 1000 MILLIGRAM(S): at 19:45

## 2023-10-20 RX ADMIN — Medication 1000 MILLIGRAM(S): at 11:50

## 2023-10-20 NOTE — PROVIDER CONTACT NOTE (OTHER) - SITUATION
Pt w/ no IV access at this time. Pt denies pain and has not used PCA pump for dilaudid since previous shift. GYN team states there is no indication for IV access and PCA pump will be discontinued.

## 2023-10-20 NOTE — PROGRESS NOTE ADULT - SUBJECTIVE AND OBJECTIVE BOX
POD4 s/p MRH/BSO/omentectomy/tumor debulking  Doing well, tolerating PO, + BM  Cr improving  For likely dc home tomorrow

## 2023-10-21 ENCOUNTER — TRANSCRIPTION ENCOUNTER (OUTPATIENT)
Age: 63
End: 2023-10-21

## 2023-10-21 VITALS
SYSTOLIC BLOOD PRESSURE: 131 MMHG | OXYGEN SATURATION: 97 % | TEMPERATURE: 99 F | DIASTOLIC BLOOD PRESSURE: 60 MMHG | HEART RATE: 91 BPM | RESPIRATION RATE: 19 BRPM

## 2023-10-21 LAB
ANION GAP SERPL CALC-SCNC: 10 MMOL/L — SIGNIFICANT CHANGE UP (ref 7–14)
ANION GAP SERPL CALC-SCNC: 10 MMOL/L — SIGNIFICANT CHANGE UP (ref 7–14)
BASOPHILS # BLD AUTO: 0.03 K/UL — SIGNIFICANT CHANGE UP (ref 0–0.2)
BASOPHILS # BLD AUTO: 0.03 K/UL — SIGNIFICANT CHANGE UP (ref 0–0.2)
BASOPHILS NFR BLD AUTO: 0.2 % — SIGNIFICANT CHANGE UP (ref 0–1)
BASOPHILS NFR BLD AUTO: 0.2 % — SIGNIFICANT CHANGE UP (ref 0–1)
BUN SERPL-MCNC: 13 MG/DL — SIGNIFICANT CHANGE UP (ref 10–20)
BUN SERPL-MCNC: 13 MG/DL — SIGNIFICANT CHANGE UP (ref 10–20)
CALCIUM SERPL-MCNC: 8.3 MG/DL — LOW (ref 8.4–10.5)
CALCIUM SERPL-MCNC: 8.3 MG/DL — LOW (ref 8.4–10.5)
CHLORIDE SERPL-SCNC: 100 MMOL/L — SIGNIFICANT CHANGE UP (ref 98–110)
CHLORIDE SERPL-SCNC: 100 MMOL/L — SIGNIFICANT CHANGE UP (ref 98–110)
CO2 SERPL-SCNC: 27 MMOL/L — SIGNIFICANT CHANGE UP (ref 17–32)
CO2 SERPL-SCNC: 27 MMOL/L — SIGNIFICANT CHANGE UP (ref 17–32)
CREAT SERPL-MCNC: 1 MG/DL — SIGNIFICANT CHANGE UP (ref 0.7–1.5)
CREAT SERPL-MCNC: 1 MG/DL — SIGNIFICANT CHANGE UP (ref 0.7–1.5)
EGFR: 64 ML/MIN/1.73M2 — SIGNIFICANT CHANGE UP
EGFR: 64 ML/MIN/1.73M2 — SIGNIFICANT CHANGE UP
EOSINOPHIL # BLD AUTO: 0.73 K/UL — HIGH (ref 0–0.7)
EOSINOPHIL # BLD AUTO: 0.73 K/UL — HIGH (ref 0–0.7)
EOSINOPHIL NFR BLD AUTO: 5.2 % — SIGNIFICANT CHANGE UP (ref 0–8)
EOSINOPHIL NFR BLD AUTO: 5.2 % — SIGNIFICANT CHANGE UP (ref 0–8)
GLUCOSE BLDC GLUCOMTR-MCNC: 117 MG/DL — HIGH (ref 70–99)
GLUCOSE BLDC GLUCOMTR-MCNC: 117 MG/DL — HIGH (ref 70–99)
GLUCOSE BLDC GLUCOMTR-MCNC: 118 MG/DL — HIGH (ref 70–99)
GLUCOSE BLDC GLUCOMTR-MCNC: 118 MG/DL — HIGH (ref 70–99)
GLUCOSE BLDC GLUCOMTR-MCNC: 131 MG/DL — HIGH (ref 70–99)
GLUCOSE BLDC GLUCOMTR-MCNC: 131 MG/DL — HIGH (ref 70–99)
GLUCOSE SERPL-MCNC: 106 MG/DL — HIGH (ref 70–99)
GLUCOSE SERPL-MCNC: 106 MG/DL — HIGH (ref 70–99)
HCT VFR BLD CALC: 25.5 % — LOW (ref 37–47)
HCT VFR BLD CALC: 25.5 % — LOW (ref 37–47)
HGB BLD-MCNC: 7.9 G/DL — LOW (ref 12–16)
HGB BLD-MCNC: 7.9 G/DL — LOW (ref 12–16)
IMM GRANULOCYTES NFR BLD AUTO: 0.5 % — HIGH (ref 0.1–0.3)
IMM GRANULOCYTES NFR BLD AUTO: 0.5 % — HIGH (ref 0.1–0.3)
LYMPHOCYTES # BLD AUTO: 1.12 K/UL — LOW (ref 1.2–3.4)
LYMPHOCYTES # BLD AUTO: 1.12 K/UL — LOW (ref 1.2–3.4)
LYMPHOCYTES # BLD AUTO: 8 % — LOW (ref 20.5–51.1)
LYMPHOCYTES # BLD AUTO: 8 % — LOW (ref 20.5–51.1)
MAGNESIUM SERPL-MCNC: 1.8 MG/DL — SIGNIFICANT CHANGE UP (ref 1.8–2.4)
MAGNESIUM SERPL-MCNC: 1.8 MG/DL — SIGNIFICANT CHANGE UP (ref 1.8–2.4)
MCHC RBC-ENTMCNC: 25.6 PG — LOW (ref 27–31)
MCHC RBC-ENTMCNC: 25.6 PG — LOW (ref 27–31)
MCHC RBC-ENTMCNC: 31 G/DL — LOW (ref 32–37)
MCHC RBC-ENTMCNC: 31 G/DL — LOW (ref 32–37)
MCV RBC AUTO: 82.8 FL — SIGNIFICANT CHANGE UP (ref 81–99)
MCV RBC AUTO: 82.8 FL — SIGNIFICANT CHANGE UP (ref 81–99)
MONOCYTES # BLD AUTO: 1.26 K/UL — HIGH (ref 0.1–0.6)
MONOCYTES # BLD AUTO: 1.26 K/UL — HIGH (ref 0.1–0.6)
MONOCYTES NFR BLD AUTO: 9 % — SIGNIFICANT CHANGE UP (ref 1.7–9.3)
MONOCYTES NFR BLD AUTO: 9 % — SIGNIFICANT CHANGE UP (ref 1.7–9.3)
NEUTROPHILS # BLD AUTO: 10.75 K/UL — HIGH (ref 1.4–6.5)
NEUTROPHILS # BLD AUTO: 10.75 K/UL — HIGH (ref 1.4–6.5)
NEUTROPHILS NFR BLD AUTO: 77.1 % — HIGH (ref 42.2–75.2)
NEUTROPHILS NFR BLD AUTO: 77.1 % — HIGH (ref 42.2–75.2)
NRBC # BLD: 0 /100 WBCS — SIGNIFICANT CHANGE UP (ref 0–0)
NRBC # BLD: 0 /100 WBCS — SIGNIFICANT CHANGE UP (ref 0–0)
PHOSPHATE SERPL-MCNC: 2.7 MG/DL — SIGNIFICANT CHANGE UP (ref 2.1–4.9)
PHOSPHATE SERPL-MCNC: 2.7 MG/DL — SIGNIFICANT CHANGE UP (ref 2.1–4.9)
PLATELET # BLD AUTO: 377 K/UL — SIGNIFICANT CHANGE UP (ref 130–400)
PLATELET # BLD AUTO: 377 K/UL — SIGNIFICANT CHANGE UP (ref 130–400)
PMV BLD: 10.8 FL — HIGH (ref 7.4–10.4)
PMV BLD: 10.8 FL — HIGH (ref 7.4–10.4)
POTASSIUM SERPL-MCNC: 4.1 MMOL/L — SIGNIFICANT CHANGE UP (ref 3.5–5)
POTASSIUM SERPL-MCNC: 4.1 MMOL/L — SIGNIFICANT CHANGE UP (ref 3.5–5)
POTASSIUM SERPL-SCNC: 4.1 MMOL/L — SIGNIFICANT CHANGE UP (ref 3.5–5)
POTASSIUM SERPL-SCNC: 4.1 MMOL/L — SIGNIFICANT CHANGE UP (ref 3.5–5)
RBC # BLD: 3.08 M/UL — LOW (ref 4.2–5.4)
RBC # BLD: 3.08 M/UL — LOW (ref 4.2–5.4)
RBC # FLD: 13.7 % — SIGNIFICANT CHANGE UP (ref 11.5–14.5)
RBC # FLD: 13.7 % — SIGNIFICANT CHANGE UP (ref 11.5–14.5)
SODIUM SERPL-SCNC: 137 MMOL/L — SIGNIFICANT CHANGE UP (ref 135–146)
SODIUM SERPL-SCNC: 137 MMOL/L — SIGNIFICANT CHANGE UP (ref 135–146)
WBC # BLD: 13.96 K/UL — HIGH (ref 4.8–10.8)
WBC # BLD: 13.96 K/UL — HIGH (ref 4.8–10.8)
WBC # FLD AUTO: 13.96 K/UL — HIGH (ref 4.8–10.8)
WBC # FLD AUTO: 13.96 K/UL — HIGH (ref 4.8–10.8)

## 2023-10-21 RX ORDER — METFORMIN HYDROCHLORIDE 850 MG/1
1 TABLET ORAL
Qty: 0 | Refills: 0 | DISCHARGE
Start: 2023-10-21

## 2023-10-21 RX ORDER — METFORMIN HYDROCHLORIDE 850 MG/1
1 TABLET ORAL
Qty: 90 | Refills: 0
Start: 2023-10-21

## 2023-10-21 RX ORDER — SENNA PLUS 8.6 MG/1
2 TABLET ORAL
Qty: 30 | Refills: 0
Start: 2023-10-21

## 2023-10-21 RX ORDER — APIXABAN 2.5 MG/1
1 TABLET, FILM COATED ORAL
Qty: 60 | Refills: 0
Start: 2023-10-21 | End: 2023-11-19

## 2023-10-21 RX ORDER — ENOXAPARIN SODIUM 40 MG/.4ML
40 INJECTION, SOLUTION SUBCUTANEOUS
Qty: 30 | Refills: 0 | Status: ACTIVE | COMMUNITY
Start: 2023-10-21 | End: 1900-01-01

## 2023-10-21 RX ORDER — SIMETHICONE 80 MG/1
1 TABLET, CHEWABLE ORAL
Qty: 30 | Refills: 0
Start: 2023-10-21

## 2023-10-21 RX ORDER — METFORMIN HYDROCHLORIDE 850 MG/1
1 TABLET ORAL
Refills: 0 | DISCHARGE

## 2023-10-21 RX ORDER — METOPROLOL TARTRATE 50 MG
0.5 TABLET ORAL
Refills: 0 | DISCHARGE

## 2023-10-21 RX ADMIN — SIMETHICONE 80 MILLIGRAM(S): 80 TABLET, CHEWABLE ORAL at 11:04

## 2023-10-21 RX ADMIN — METFORMIN HYDROCHLORIDE 1000 MILLIGRAM(S): 850 TABLET ORAL at 05:07

## 2023-10-21 RX ADMIN — AMLODIPINE BESYLATE 10 MILLIGRAM(S): 2.5 TABLET ORAL at 05:08

## 2023-10-21 RX ADMIN — LISINOPRIL 10 MILLIGRAM(S): 2.5 TABLET ORAL at 05:07

## 2023-10-21 RX ADMIN — APIXABAN 2.5 MILLIGRAM(S): 2.5 TABLET, FILM COATED ORAL at 05:08

## 2023-10-21 RX ADMIN — CHLORHEXIDINE GLUCONATE 1 APPLICATION(S): 213 SOLUTION TOPICAL at 11:03

## 2023-10-21 NOTE — DISCHARGE NOTE PROVIDER - NSDCFUADDINST_GEN_ALL_CORE_FT
Nothing in the vagina for 8 weeks (no tampons, no intercourse, no douching). No swimming pools/tub baths. Showers OK. If you have a fever >100.4F, heavy vaginal bleeding or severe abdominal pain despite medication, drainage from the incisions, please call your doctor or go to the emergency room.

## 2023-10-21 NOTE — PROGRESS NOTE ADULT - ASSESSMENT
61 y/o G0, PMHx T2DM, HTN, HLD, obesity s/p robotic hysterectomy converted to open, BS, left oophorectomy, absent right ovary, omentectomy for carcinosarcoma, EBL 1000cc, POD#5, recovering well    - Regular PO diet  - Vitals q4  - incentive spirometry   - Eliquis 2.5mg BID, x1 month  - Encourage ambulation  - FS ACQHS with SSI  - Trend creatinine, stable  - Repeat sonogram: normal  - PT consult: rolling walker  - Nephrology consult: Likely pre-renal, continue 75ml/hr IV fluid, monitor I/O, d/c hydrochlorothiazide, daily BMP  - F/u AM labs  - Anticipate discharge home today

## 2023-10-21 NOTE — DISCHARGE NOTE PROVIDER - CARE PROVIDER_API CALL
Yao Patino Blaine  Gynecologic Oncology  [address] [place] [id number]  Phone: [phone]  Fax: [phone]  Follow Up Time:

## 2023-10-21 NOTE — DISCHARGE NOTE PROVIDER - NSDCMRMEDTOKEN_GEN_ALL_CORE_FT
amLODIPine 10 mg oral tablet: 1 tab(s) orally once a day  atorvastatin 20 mg oral tablet: 1 tab(s) orally once a day  Eliquis 2.5 mg oral tablet: 1 tab(s) orally 2 times a day  lisinopril-hydrochlorothiazide 20 mg-25 mg oral tablet: 1 tab(s) orally once a day  metFORMIN 1000 mg oral tablet: 1 tab(s) orally every 12 hours  senna leaf extract oral tablet: 2 tab(s) orally once a day (at bedtime)  simethicone 80 mg oral tablet, chewable: 1 tab(s) orally every 6 hours

## 2023-10-21 NOTE — PROGRESS NOTE ADULT - SUBJECTIVE AND OBJECTIVE BOX
PGY3 Note    POD #: 5    S/p robotic hysterectomy converted open with BS, left oophorectomy, absent right ovary    HPI: Pt seen and examined at bedside. She is doing well, pain well controlled. No overnight events, no acute complaints. She is ambulating, voiding without difficulty, tolerating regular PO diet, passing flatus and +BM. Denies HA, CP, SOB, nausea, vomiting, fevers, chills, urinary symptoms, changes in bowel habits.     PAST MEDICAL & SURGICAL HISTORY:  DM (diabetes mellitus)  HTN (hypertension)  Hypercholesteremia  Uterine fibroid  Former smoker  Obesity, Class II, BMI 35-39.9  History of D&C  H/O myomectomy  S/P cholecystectomy    Physical Exam  Vital Signs Last 24 Hrs  T(F): 99.9 (21 Oct 2023 04:45), Max: 99.9 (21 Oct 2023 04:45)  HR: 102 (21 Oct 2023 04:45) (93 - 103)  BP: 128/60 (21 Oct 2023 04:45) (128/60 - 156/67)  RR: 18 (21 Oct 2023 04:45) (18 - 18)    Physical exam:  General - AAOx3, NAD  Abdomen:  - Soft, nontender, nondistendd  - 4 laparoscopic incisions with staples, no drainage or erythema, healing well  - Vertical incision, staples in place, no drainage, no surrounding erythema, healing well  Pelvis/Vagina - No bleeding  Extremities - No calf tenderness, no swelling    Labs:                        7.9    13.96 )-----------( 377      ( 21 Oct 2023 05:40 )             25.5                         7.9    12.76 )-----------( 363      ( 20 Oct 2023 05:06 )             25.5     137  |  100  |  13  ----------------------------<106  4.1  |  27  |  1.0    Magnesium: 1.8 mg/dL (10-21-23 @ 05:40)    Phosphorus: 2.7 mg/dL (10-21-23 @ 05:40)    Antibody Screen: NEG (10-17-23 @ 14:42)    10/16 FS: 177/66/176/170                   10/17 FS: 144/147/125/144  10/18 FS: 145/135/139/124                 10/19 FS: 113/123/126/123  10/20 FS: 107/112/123/127    < from: US Kidney and Bladder (10.18.23 @ 12:22) >    ACC: [id number] EXAM:  US KIDNEYS AND BLADDER   ORDERED BY: ADALBERTO NGUYEN     PROCEDURE DATE:  10/18/2023      INTERPRETATION:  CLINICAL INFORMATION: Elevated creatinine,   hydronephrosis. Status post total abdominal hysterectomy-bilateral  salpingo-oophorectomy postoperative day #2    COMPARISON: 10/17/2023    TECHNIQUE: Sonography of the kidneys and bladder.    FINDINGS:    Right kidney: 12.0 cm. No hydronephrosis or calculi.    Left kidney:  11.2 cm. No hydronephrosis or calculi.    Urinary bladder: Hinojosa catheter is in place.    IMPRESSION:    No hydronephrosis or calculi.    --- End of Report ---    KIARRA URBANO MD; Attending Radiologist  This document has been electronically signed. Oct 18 2023 12:29PM    < end of copied text >    MEDICATIONS  (STANDING):  acetaminophen     Tablet .. 1000 milliGRAM(s) Oral every 6 hours  amLODIPine   Tablet 10 milliGRAM(s) Oral daily  apixaban 2.5 milliGRAM(s) Oral every 12 hours  chlorhexidine 2% Cloths 1 Application(s) Topical daily  dextrose 5%. 1000 milliLiter(s) (100 mL/Hr) IV Continuous <Continuous>  dextrose 5%. 1000 milliLiter(s) (50 mL/Hr) IV Continuous <Continuous>  dextrose 50% Injectable 25 Gram(s) IV Push once  dextrose 50% Injectable 12.5 Gram(s) IV Push once  dextrose 50% Injectable 25 Gram(s) IV Push once  glucagon  Injectable 1 milliGRAM(s) IntraMuscular once  insulin lispro (ADMELOG) corrective regimen sliding scale   SubCutaneous three times a day before meals  insulin lispro (ADMELOG) corrective regimen sliding scale   SubCutaneous at bedtime  lisinopril 10 milliGRAM(s) Oral daily  metFORMIN 1000 milliGRAM(s) Oral every 12 hours  senna 2 Tablet(s) Oral at bedtime  simethicone 80 milliGRAM(s) Chew every 6 hours    MEDICATIONS  (PRN):  acetaminophen     Tablet .. 1000 milliGRAM(s) Oral every 6 hours PRN Mild Pain (1 - 3)  dextrose Oral Gel 15 Gram(s) Oral once PRN Blood Glucose LESS THAN 70 milliGRAM(s)/deciliter  naloxone Injectable 0.1 milliGRAM(s) IV Push every 3 minutes PRN For ANY of the following changes in patient status:  A. RR LESS THAN 10 breaths per minute, B. Oxygen saturation LESS THAN 90%, C. Sedation score of 6  ondansetron    Tablet 8 milliGRAM(s) Oral every 8 hours PRN Nausea and/or Vomiting  ondansetron Injectable 4 milliGRAM(s) IV Push every 6 hours PRN Nausea

## 2023-10-21 NOTE — DISCHARGE NOTE NURSING/CASE MANAGEMENT/SOCIAL WORK - PATIENT PORTAL LINK FT
You can access the FollowMyHealth Patient Portal offered by [facility] by registering at the following website: [email or web address]. By joining [facility]’s FollowMyHealth portal, you will also be able to view your health information using other applications (apps) compatible with our system.

## 2023-10-21 NOTE — DISCHARGE NOTE PROVIDER - NSDCCPCAREPLAN_GEN_ALL_CORE_FT
PRINCIPAL DISCHARGE DIAGNOSIS  Diagnosis: S/P hysterectomy  Assessment and Plan of Treatment: Robotic hysterectomy converted to open, BSO, omentectomy for carcinosarcoma

## 2023-10-21 NOTE — DISCHARGE NOTE PROVIDER - NSDCFUSCHEDAPPT_GEN_ALL_CORE_FT
Yao Patino  [facility]  [facility] [address]  Scheduled Appointment: 10/27/2023

## 2023-10-21 NOTE — DISCHARGE NOTE PROVIDER - HOSPITAL COURSE
61 y/o G0, s/p Robotic hysterectomy converted to open, BSO, omentectomy for carcinosarcoma, EBL 1000cc. Diagnosed with pre-renal AGUSTO, stable creatinine at 1.0. Discharged home in stable condition.

## 2023-10-23 PROBLEM — E11.9 TYPE 2 DIABETES MELLITUS WITHOUT COMPLICATIONS: Chronic | Status: ACTIVE | Noted: 2023-10-02

## 2023-10-23 PROBLEM — Z87.891 PERSONAL HISTORY OF NICOTINE DEPENDENCE: Chronic | Status: ACTIVE | Noted: 2023-10-02

## 2023-10-23 PROBLEM — E66.9 OBESITY, UNSPECIFIED: Chronic | Status: ACTIVE | Noted: 2023-10-02

## 2023-10-25 ENCOUNTER — APPOINTMENT (OUTPATIENT)
Dept: GYNECOLOGIC ONCOLOGY | Facility: CLINIC | Age: 63
End: 2023-10-25
Payer: COMMERCIAL

## 2023-10-25 VITALS
SYSTOLIC BLOOD PRESSURE: 160 MMHG | BODY MASS INDEX: 44.15 KG/M2 | HEART RATE: 105 BPM | HEIGHT: 65 IN | WEIGHT: 265 LBS | DIASTOLIC BLOOD PRESSURE: 68 MMHG

## 2023-10-25 PROBLEM — D25.9 LEIOMYOMA OF UTERUS, UNSPECIFIED: Chronic | Status: ACTIVE | Noted: 2023-10-02

## 2023-10-25 PROBLEM — E78.00 PURE HYPERCHOLESTEROLEMIA, UNSPECIFIED: Chronic | Status: ACTIVE | Noted: 2023-10-02

## 2023-10-25 PROBLEM — I10 ESSENTIAL (PRIMARY) HYPERTENSION: Chronic | Status: ACTIVE | Noted: 2023-10-02

## 2023-10-25 PROCEDURE — 99213 OFFICE O/P EST LOW 20 MIN: CPT | Mod: 24

## 2023-10-26 DIAGNOSIS — E11.9 TYPE 2 DIABETES MELLITUS WITHOUT COMPLICATIONS: ICD-10-CM

## 2023-10-26 DIAGNOSIS — N32.89 OTHER SPECIFIED DISORDERS OF BLADDER: ICD-10-CM

## 2023-10-26 DIAGNOSIS — I10 ESSENTIAL (PRIMARY) HYPERTENSION: ICD-10-CM

## 2023-10-26 DIAGNOSIS — N73.6 FEMALE PELVIC PERITONEAL ADHESIONS (POSTINFECTIVE): ICD-10-CM

## 2023-10-26 DIAGNOSIS — Z90.49 ACQUIRED ABSENCE OF OTHER SPECIFIED PARTS OF DIGESTIVE TRACT: ICD-10-CM

## 2023-10-26 DIAGNOSIS — C55 MALIGNANT NEOPLASM OF UTERUS, PART UNSPECIFIED: ICD-10-CM

## 2023-10-26 DIAGNOSIS — R18.8 OTHER ASCITES: ICD-10-CM

## 2023-10-26 DIAGNOSIS — Z87.891 PERSONAL HISTORY OF NICOTINE DEPENDENCE: ICD-10-CM

## 2023-10-26 DIAGNOSIS — Z53.31 LAPAROSCOPIC SURGICAL PROCEDURE CONVERTED TO OPEN PROCEDURE: ICD-10-CM

## 2023-10-26 DIAGNOSIS — D25.9 LEIOMYOMA OF UTERUS, UNSPECIFIED: ICD-10-CM

## 2023-10-26 DIAGNOSIS — Z79.84 LONG TERM (CURRENT) USE OF ORAL HYPOGLYCEMIC DRUGS: ICD-10-CM

## 2023-10-26 DIAGNOSIS — Z79.01 LONG TERM (CURRENT) USE OF ANTICOAGULANTS: ICD-10-CM

## 2023-10-26 DIAGNOSIS — E78.5 HYPERLIPIDEMIA, UNSPECIFIED: ICD-10-CM

## 2023-10-26 DIAGNOSIS — E66.9 OBESITY, UNSPECIFIED: ICD-10-CM

## 2023-10-26 DIAGNOSIS — N17.9 ACUTE KIDNEY FAILURE, UNSPECIFIED: ICD-10-CM

## 2023-10-30 ENCOUNTER — APPOINTMENT (OUTPATIENT)
Dept: HEMATOLOGY ONCOLOGY | Facility: CLINIC | Age: 63
End: 2023-10-30
Payer: COMMERCIAL

## 2023-10-30 ENCOUNTER — LABORATORY RESULT (OUTPATIENT)
Age: 63
End: 2023-10-30

## 2023-10-30 ENCOUNTER — OUTPATIENT (OUTPATIENT)
Dept: OUTPATIENT SERVICES | Facility: HOSPITAL | Age: 63
LOS: 1 days | End: 2023-10-30
Payer: COMMERCIAL

## 2023-10-30 VITALS
TEMPERATURE: 97.1 F | BODY MASS INDEX: 38.49 KG/M2 | WEIGHT: 231 LBS | HEIGHT: 65 IN | DIASTOLIC BLOOD PRESSURE: 65 MMHG | HEART RATE: 83 BPM | SYSTOLIC BLOOD PRESSURE: 144 MMHG

## 2023-10-30 DIAGNOSIS — Z98.890 OTHER SPECIFIED POSTPROCEDURAL STATES: Chronic | ICD-10-CM

## 2023-10-30 DIAGNOSIS — C54.1 MALIGNANT NEOPLASM OF ENDOMETRIUM: ICD-10-CM

## 2023-10-30 DIAGNOSIS — Z90.49 ACQUIRED ABSENCE OF OTHER SPECIFIED PARTS OF DIGESTIVE TRACT: Chronic | ICD-10-CM

## 2023-10-30 LAB
ALBUMIN SERPL ELPH-MCNC: 4.2 G/DL
ALP BLD-CCNC: 67 U/L
ALT SERPL-CCNC: 17 U/L
ANION GAP SERPL CALC-SCNC: 13 MMOL/L
AST SERPL-CCNC: 12 U/L
BILIRUB SERPL-MCNC: 0.2 MG/DL
BUN SERPL-MCNC: 15 MG/DL
CALCIUM SERPL-MCNC: 9.7 MG/DL
CHLORIDE SERPL-SCNC: 99 MMOL/L
CO2 SERPL-SCNC: 29 MMOL/L
CREAT SERPL-MCNC: 1.2 MG/DL
EGFR: 52 ML/MIN/1.73M2
GLUCOSE SERPL-MCNC: 98 MG/DL
HCT VFR BLD CALC: 31.5 %
HGB BLD-MCNC: 9.6 G/DL
INR PPP: 1.31 RATIO
MCHC RBC-ENTMCNC: 25.2 PG
MCHC RBC-ENTMCNC: 30.5 G/DL
MCV RBC AUTO: 82.7 FL
PLATELET # BLD AUTO: 513 K/UL
PMV BLD: 10.5 FL
POTASSIUM SERPL-SCNC: 4.6 MMOL/L
PROT SERPL-MCNC: 7.1 G/DL
PT BLD: 15 SEC
RBC # BLD: 3.81 M/UL
RBC # FLD: 14.3 %
SODIUM SERPL-SCNC: 141 MMOL/L
WBC # FLD AUTO: 17.25 K/UL

## 2023-10-30 PROCEDURE — 99205 OFFICE O/P NEW HI 60 MIN: CPT

## 2023-10-30 PROCEDURE — 85027 COMPLETE CBC AUTOMATED: CPT

## 2023-10-30 PROCEDURE — 84443 ASSAY THYROID STIM HORMONE: CPT

## 2023-10-30 PROCEDURE — 85610 PROTHROMBIN TIME: CPT

## 2023-10-30 PROCEDURE — 80053 COMPREHEN METABOLIC PANEL: CPT

## 2023-10-30 RX ORDER — OMEPRAZOLE MAGNESIUM 20 MG/1
20 TABLET, DELAYED RELEASE ORAL DAILY
Qty: 30 | Refills: 1 | Status: ACTIVE | COMMUNITY
Start: 2023-10-30 | End: 1900-01-01

## 2023-10-30 RX ORDER — L. ACIDOPHILUS/L.BULGARICUS 1MM CELL
TABLET ORAL
Qty: 30 | Refills: 1 | Status: ACTIVE | COMMUNITY
Start: 2023-10-30 | End: 1900-01-01

## 2023-10-31 ENCOUNTER — APPOINTMENT (OUTPATIENT)
Dept: GYNECOLOGIC ONCOLOGY | Facility: CLINIC | Age: 63
End: 2023-10-31
Payer: COMMERCIAL

## 2023-10-31 ENCOUNTER — APPOINTMENT (OUTPATIENT)
Dept: INFUSION THERAPY | Facility: CLINIC | Age: 63
End: 2023-10-31

## 2023-10-31 ENCOUNTER — OUTPATIENT (OUTPATIENT)
Dept: OUTPATIENT SERVICES | Facility: HOSPITAL | Age: 63
LOS: 1 days | End: 2023-10-31
Payer: COMMERCIAL

## 2023-10-31 VITALS
HEART RATE: 90 BPM | RESPIRATION RATE: 14 BRPM | TEMPERATURE: 97.4 F | HEIGHT: 65 IN | BODY MASS INDEX: 38.65 KG/M2 | SYSTOLIC BLOOD PRESSURE: 138 MMHG | DIASTOLIC BLOOD PRESSURE: 72 MMHG | WEIGHT: 232 LBS

## 2023-10-31 DIAGNOSIS — Z98.890 OTHER SPECIFIED POSTPROCEDURAL STATES: Chronic | ICD-10-CM

## 2023-10-31 DIAGNOSIS — Z01.419 ENCOUNTER FOR GYNECOLOGICAL EXAMINATION (GENERAL) (ROUTINE) WITHOUT ABNORMAL FINDINGS: ICD-10-CM

## 2023-10-31 DIAGNOSIS — Z90.49 ACQUIRED ABSENCE OF OTHER SPECIFIED PARTS OF DIGESTIVE TRACT: Chronic | ICD-10-CM

## 2023-10-31 DIAGNOSIS — C54.1 MALIGNANT NEOPLASM OF ENDOMETRIUM: ICD-10-CM

## 2023-10-31 LAB — TSH SERPL-ACNC: 1.4 UIU/ML

## 2023-10-31 PROCEDURE — 99024 POSTOP FOLLOW-UP VISIT: CPT

## 2023-10-31 RX ORDER — PROCHLORPERAZINE MALEATE 10 MG/1
10 TABLET ORAL
Qty: 180 | Refills: 2 | Status: ACTIVE | COMMUNITY
Start: 2023-10-30 | End: 1900-01-01

## 2023-10-31 RX ORDER — ONDANSETRON 8 MG/1
8 TABLET, ORALLY DISINTEGRATING ORAL EVERY 8 HOURS
Qty: 180 | Refills: 0 | Status: ACTIVE | COMMUNITY
Start: 2023-10-30 | End: 1900-01-01

## 2023-11-01 ENCOUNTER — NON-APPOINTMENT (OUTPATIENT)
Age: 63
End: 2023-11-01

## 2023-11-01 ENCOUNTER — OUTPATIENT (OUTPATIENT)
Dept: OUTPATIENT SERVICES | Facility: HOSPITAL | Age: 63
LOS: 1 days | End: 2023-11-01
Payer: COMMERCIAL

## 2023-11-01 VITALS
RESPIRATION RATE: 18 BRPM | HEART RATE: 81 BPM | OXYGEN SATURATION: 97 % | DIASTOLIC BLOOD PRESSURE: 70 MMHG | WEIGHT: 229.94 LBS | SYSTOLIC BLOOD PRESSURE: 149 MMHG | TEMPERATURE: 97 F | HEIGHT: 65 IN

## 2023-11-01 DIAGNOSIS — L24.A9 IRRITANT CONTACT DERMATITIS DUE FRICTION OR CONTACT WITH OTHER SPECIFIED BODY FLUIDS: ICD-10-CM

## 2023-11-01 DIAGNOSIS — Z98.890 OTHER SPECIFIED POSTPROCEDURAL STATES: Chronic | ICD-10-CM

## 2023-11-01 DIAGNOSIS — Z90.49 ACQUIRED ABSENCE OF OTHER SPECIFIED PARTS OF DIGESTIVE TRACT: Chronic | ICD-10-CM

## 2023-11-01 DIAGNOSIS — Z01.818 ENCOUNTER FOR OTHER PREPROCEDURAL EXAMINATION: ICD-10-CM

## 2023-11-01 DIAGNOSIS — C54.1 MALIGNANT NEOPLASM OF ENDOMETRIUM: ICD-10-CM

## 2023-11-01 PROCEDURE — 99214 OFFICE O/P EST MOD 30 MIN: CPT | Mod: 25

## 2023-11-01 NOTE — H&P PST ADULT - REASON FOR ADMISSION
Patient is a  62 year old  female presenting to PAST in preparation for  Port Placement  on 11/7/23   under anesthesia by Dr. geller
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
## 2023-11-01 NOTE — H&P PST ADULT - HISTORY OF PRESENT ILLNESS
PATIENT CURRENTLY DENIES CHEST PAIN  SHORTNESS OF BREATH  PALPITATIONS,  DYSURIA, OR UPPER RESPIRATORY INFECTION IN PAST 2 WEEKS  denies travel outside the [place] in the past 30 days  Patient denies any signs or symptoms of COVID 19 and denies contact with known positive individuals.  They have an appointment for repeat COVID testing pre-procedure and acknowledge its time and place.  They were instructed to quarantine pre-procedure, practice exposure control measures, continue to self-monitor and report any concerns to their proceduralist.  pt advised self quarantine till day of procedure    Anesthesia Alert  NO--Difficult Airway  NO--History of neck surgery or radiation  NO--Limited ROM of neck  NO--History of Malignant hyperthermia  NO--No personal or family history of Pseudocholinesterase deficiency.  NO--Prior Anesthesia Complication  NO--Latex Allergy  NO--Loose teeth  NO--History of Rheumatoid Arthritis  NO--Bleeding risk  NO--ADELINA  NO--Other_____    PT DENIES ANY RASHES, ABRASION, OR OPEN WOUNDS OR CUTS    AS PER THE PT, THIS IS HIS/HER COMPLETE MEDICAL AND SURGICAL HX, INCLUDING MEDICATIONS PRESCRIBED AND OVER THE COUNTER    Patient verbalized understanding of instructions and was given the opportunity to ask questions and have them answered.    pt denies any suicidal ideation or thoughts, pt states not a threat to self or others    Malignant neoplasm of endometrium    Encounter for other preprocedural examination    Family history of diabetes mellitus (DM) (Mother)    DM (diabetes mellitus)    HTN (hypertension)    Hypercholesteremia    Uterine fibroid    Former smoker    Obesity, Class II, BMI 35-39.9    History of D&C    H/O myomectomy    S/P cholecystectomy    SysAdmin_VstLnk    Revised Cardiac Risk Index for Pre-Operative Risk from [employer]CSR  on 11/1/2023    RESULT SUMMARY:  0 points  Class I Risk    3.9 %  30-day risk of death, MI, or cardiac arrest    From Duceppe 2017, based on pooled data from 5 high quality external validations (4 prospective). These numbers are higher than those often quoted from the now-outdated original study (Daniel 1999). See Evidence for details.      INPUTS:  Elevated-risk surgery —> 0 = No  History of ischemic heart disease —> 0 = No  History of congestive heart failure —> 0 = No  History of cerebrovascular disease —> 0 = No  Pre-operative treatment with insulin —> 0 = No  Pre-operative creatinine >2 mg/dL / 176.8 µmol/L —> 0 = No    Duke Activity Status Index (DASI) from [employer]  on 11/1/2023    RESULT SUMMARY:  58.2 points  The higher the score (maximum 58.2), the higher the functional status.    9.89 METs    INPUTS:  Take care of self —> 2.75 = Yes  Walk indoors —> 1.75 = Yes  Walk 1&ndash;2 blocks on level ground —> 2.75 = Yes  Climb a flight of stairs or walk up a hill —> 5.5 = Yes  Run a short distance —> 8 = Yes  Do light work around the house —> 2.7 = Yes  Do moderate work around the house —> 3.5 = Yes  Do heavy work around the house —> 8 = Yes  Do yardwork —> 4.5 = Yes  Have sexual relations —> 5.25 = Yes  Participate in moderate recreational activities —> 6 = Yes  Participate in strenuous sports —> 7.5 = Yes

## 2023-11-02 DIAGNOSIS — Z01.818 ENCOUNTER FOR OTHER PREPROCEDURAL EXAMINATION: ICD-10-CM

## 2023-11-02 DIAGNOSIS — C54.1 MALIGNANT NEOPLASM OF ENDOMETRIUM: ICD-10-CM

## 2023-11-07 ENCOUNTER — RESULT REVIEW (OUTPATIENT)
Age: 63
End: 2023-11-07

## 2023-11-07 ENCOUNTER — OUTPATIENT (OUTPATIENT)
Dept: OUTPATIENT SERVICES | Facility: HOSPITAL | Age: 63
LOS: 1 days | Discharge: HOME | End: 2023-11-07
Payer: COMMERCIAL

## 2023-11-07 ENCOUNTER — TRANSCRIPTION ENCOUNTER (OUTPATIENT)
Age: 63
End: 2023-11-07

## 2023-11-07 VITALS
DIASTOLIC BLOOD PRESSURE: 52 MMHG | OXYGEN SATURATION: 95 % | SYSTOLIC BLOOD PRESSURE: 106 MMHG | HEART RATE: 89 BPM | RESPIRATION RATE: 15 BRPM

## 2023-11-07 VITALS
RESPIRATION RATE: 17 BRPM | HEART RATE: 86 BPM | WEIGHT: 229.94 LBS | TEMPERATURE: 97 F | SYSTOLIC BLOOD PRESSURE: 132 MMHG | HEIGHT: 65 IN | DIASTOLIC BLOOD PRESSURE: 63 MMHG

## 2023-11-07 DIAGNOSIS — C54.1 MALIGNANT NEOPLASM OF ENDOMETRIUM: ICD-10-CM

## 2023-11-07 DIAGNOSIS — Z90.49 ACQUIRED ABSENCE OF OTHER SPECIFIED PARTS OF DIGESTIVE TRACT: Chronic | ICD-10-CM

## 2023-11-07 DIAGNOSIS — Z98.890 OTHER SPECIFIED POSTPROCEDURAL STATES: Chronic | ICD-10-CM

## 2023-11-07 DIAGNOSIS — Z45.2 ENCOUNTER FOR ADJUSTMENT AND MANAGEMENT OF VASCULAR ACCESS DEVICE: ICD-10-CM

## 2023-11-07 PROCEDURE — 77001 FLUOROGUIDE FOR VEIN DEVICE: CPT

## 2023-11-07 PROCEDURE — 36561 INSERT TUNNELED CV CATH: CPT

## 2023-11-07 PROCEDURE — 76937 US GUIDE VASCULAR ACCESS: CPT | Mod: 26

## 2023-11-07 PROCEDURE — C1788: CPT

## 2023-11-07 PROCEDURE — 76937 US GUIDE VASCULAR ACCESS: CPT

## 2023-11-07 PROCEDURE — 77001 FLUOROGUIDE FOR VEIN DEVICE: CPT | Mod: 26

## 2023-11-07 PROCEDURE — C1769: CPT

## 2023-11-07 RX ORDER — ATORVASTATIN CALCIUM 80 MG/1
1 TABLET, FILM COATED ORAL
Refills: 0 | DISCHARGE

## 2023-11-07 RX ORDER — CEFAZOLIN SODIUM 1 G
2000 VIAL (EA) INJECTION ONCE
Refills: 0 | Status: COMPLETED | OUTPATIENT
Start: 2023-11-07 | End: 2023-11-07

## 2023-11-07 RX ORDER — LISINOPRIL/HYDROCHLOROTHIAZIDE 10-12.5 MG
1 TABLET ORAL
Refills: 0 | DISCHARGE

## 2023-11-07 RX ORDER — AMLODIPINE BESYLATE 2.5 MG/1
1 TABLET ORAL
Refills: 0 | DISCHARGE

## 2023-11-07 RX ORDER — METOPROLOL TARTRATE 50 MG
1 TABLET ORAL
Refills: 0 | DISCHARGE

## 2023-11-07 RX ADMIN — Medication 100 MILLIGRAM(S): at 11:20

## 2023-11-07 NOTE — PRE PROCEDURE NOTE - PRE PROCEDURE EVALUATION
Patient is 62 year old female with history of endometrial cancer presenting for port placement with IR. Patient understands the risks and benefits of the procedure and wishes to proceed. Will proceeding with image guided right sided port placement today with anesthesia.

## 2023-11-07 NOTE — ASU DISCHARGE PLAN (ADULT/PEDIATRIC) - NS MD DC FALL RISK RISK
For information on Fall & Injury Prevention, visit: [email or web address] OR  [email or web address] OR  https://www.cdc.gov/steadi/patient.html

## 2023-11-07 NOTE — CHART NOTE - NSCHARTNOTEFT_GEN_A_CORE
PACU ANESTHESIA ADMISSION NOTE      Procedure: port placement right  \    __x__  Patent Airway    __x__  Full return of protective reflexes    __x__  Full recovery from anesthesia / back to baseline status    Vitals:  T(C): 36.1 (11-07-23 @ 10:32), Max: 36.1 (11-07-23 @ 08:45)  HR: 86 (11-07-23 @ 10:32) (86 - 86)  BP: 132/63 (11-07-23 @ 10:32) (132/63 - 132/63)  RR: 17 (11-07-23 @ 10:32) (17 - 17)  SpO2: --    Mental Status:  __x__ Awake   ___x__ Alert   _____ Drowsy   _____ Sedated    Nausea/Vomiting:  __x__ NO  ______Yes,   See Post - Op Orders          Pain Scale (0-10):  _____    Treatment: ____ None    __x__ See Post - Op/PCA Orders    Post - Operative Fluids:   ____ Oral   __x__ See Post - Op Orders    Plan: Discharge:   __x__Home       _____Floor     _____Critical Care    _____  Other:_________________    Comments: Patient had smooth intraoperative event, no anesthesia complication.  PACU Vital signs: 98.1, 95, 14, 119/57, 85

## 2023-11-07 NOTE — ASU PATIENT PROFILE, ADULT - ALCOHOL USE HISTORY SINGLE SELECT
Patient is out of restraints as bi-pap is on. IVP Ativan was given d/t agitation. Precedex 1.4mcg/kg/hr is off as patient's heart rate was in the upper 40's and keeps fluctuating in the mid 50's to low 60's. Writer is contacting physician regarding sedation. never

## 2023-11-07 NOTE — PROGRESS NOTE ADULT - SUBJECTIVE AND OBJECTIVE BOX
INTERVENTIONAL RADIOLOGY BRIEF-OPERATIVE NOTE    Procedure: right sided port placement   Pre-Op Diagnosis: Endometrial cancer  Post-Op Diagnosis: same   Attending: Mike  Resident: Juwan Mcgraw  Anesthesia (type):  [ ] General Anesthesia  [x] Sedation - provided by present anesthesiologist   [ ] Spinal Anesthesia  [x] Local/Regional    Contrast: 0 cc   Estimated Blood Loss: Minimal, < 5 cc  Condition:   [ ] Critical  [ ] Serious  [ ] Fair   [x] Good    Findings/Follow up Plan of Care: successful placement of right IJ chest port under image guidance with anesthesia.   Specimens Removed: none.   Implants: right sided chest port   Complications: none immediate.   Disposition: recovery then home if stable.     Please call Interventional Radiology [phone] with any questions, concerns, or issues.         INTERVENTIONAL RADIOLOGY BRIEF-OPERATIVE NOTE    Procedure: right sided port placement   Pre-Op Diagnosis: Endometrial cancer  Post-Op Diagnosis: same   Attending: Mike  Resident: Juwan Mcgraw  Anesthesia (type):  [ ] General Anesthesia  [x] Sedation - provided by present anesthesiologist   [ ] Spinal Anesthesia  [x] Local/Regional    Contrast: 0 cc   Estimated Blood Loss: Minimal, < 5 cc  Condition:   [ ] Critical  [ ] Serious  [ ] Fair   [x] Good    Findings/Follow up Plan of Care: successful placement of right IJ chest port under image guidance with anesthesia.  Ready for use.    Specimens Removed: none.   Implants: right sided chest port   Complications: none immediate.   Disposition: recovery then home if stable.     Please call Interventional Radiology [phone] with any questions, concerns, or issues.

## 2023-11-09 DIAGNOSIS — C54.1 MALIGNANT NEOPLASM OF ENDOMETRIUM: ICD-10-CM

## 2023-11-15 ENCOUNTER — LABORATORY RESULT (OUTPATIENT)
Age: 63
End: 2023-11-15

## 2023-11-15 ENCOUNTER — OUTPATIENT (OUTPATIENT)
Dept: OUTPATIENT SERVICES | Facility: HOSPITAL | Age: 63
LOS: 1 days | End: 2023-11-15
Payer: COMMERCIAL

## 2023-11-15 ENCOUNTER — APPOINTMENT (OUTPATIENT)
Dept: INFUSION THERAPY | Facility: CLINIC | Age: 63
End: 2023-11-15

## 2023-11-15 VITALS
HEART RATE: 87 BPM | TEMPERATURE: 98.4 F | HEIGHT: 64 IN | DIASTOLIC BLOOD PRESSURE: 65 MMHG | BODY MASS INDEX: 38.58 KG/M2 | WEIGHT: 226 LBS | SYSTOLIC BLOOD PRESSURE: 139 MMHG

## 2023-11-15 DIAGNOSIS — Z98.890 OTHER SPECIFIED POSTPROCEDURAL STATES: Chronic | ICD-10-CM

## 2023-11-15 DIAGNOSIS — C54.1 MALIGNANT NEOPLASM OF ENDOMETRIUM: ICD-10-CM

## 2023-11-15 DIAGNOSIS — Z90.49 ACQUIRED ABSENCE OF OTHER SPECIFIED PARTS OF DIGESTIVE TRACT: Chronic | ICD-10-CM

## 2023-11-15 LAB
ALBUMIN SERPL ELPH-MCNC: 3.8 G/DL
ALP BLD-CCNC: 69 U/L
ALT SERPL-CCNC: <5 U/L
ANION GAP SERPL CALC-SCNC: 11 MMOL/L
AST SERPL-CCNC: 7 U/L
BILIRUB SERPL-MCNC: 0.2 MG/DL
BUN SERPL-MCNC: 21 MG/DL
CALCIUM SERPL-MCNC: 9 MG/DL
CHLORIDE SERPL-SCNC: 97 MMOL/L
CO2 SERPL-SCNC: 28 MMOL/L
CREAT SERPL-MCNC: 1 MG/DL
EGFR: 64 ML/MIN/1.73M2
GLUCOSE SERPL-MCNC: 148 MG/DL
HCT VFR BLD CALC: 28.9 %
HGB BLD-MCNC: 9.1 G/DL
MCHC RBC-ENTMCNC: 25.1 PG
MCHC RBC-ENTMCNC: 31.5 G/DL
MCV RBC AUTO: 79.6 FL
PLATELET # BLD AUTO: 369 K/UL
PMV BLD: 10.8 FL
POTASSIUM SERPL-SCNC: 3.7 MMOL/L
PROT SERPL-MCNC: 6.9 G/DL
RBC # BLD: 3.63 M/UL
RBC # FLD: 13.9 %
SODIUM SERPL-SCNC: 136 MMOL/L
TSH SERPL-ACNC: 1.2 UIU/ML
WBC # FLD AUTO: 9.38 K/UL

## 2023-11-15 PROCEDURE — 96417 CHEMO IV INFUS EACH ADDL SEQ: CPT

## 2023-11-15 PROCEDURE — 80053 COMPREHEN METABOLIC PANEL: CPT

## 2023-11-15 PROCEDURE — 96375 TX/PRO/DX INJ NEW DRUG ADDON: CPT

## 2023-11-15 PROCEDURE — 96367 TX/PROPH/DG ADDL SEQ IV INF: CPT

## 2023-11-15 PROCEDURE — 36415 COLL VENOUS BLD VENIPUNCTURE: CPT

## 2023-11-15 PROCEDURE — 96415 CHEMO IV INFUSION ADDL HR: CPT

## 2023-11-15 PROCEDURE — 85027 COMPLETE CBC AUTOMATED: CPT

## 2023-11-15 PROCEDURE — 84443 ASSAY THYROID STIM HORMONE: CPT

## 2023-11-15 PROCEDURE — 96413 CHEMO IV INFUSION 1 HR: CPT

## 2023-11-15 PROCEDURE — 96377 APPLICATON ON-BODY INJECTOR: CPT

## 2023-11-15 RX ORDER — DIPHENHYDRAMINE HCL 50 MG
50 CAPSULE ORAL ONCE
Refills: 0 | Status: COMPLETED | OUTPATIENT
Start: 2023-11-15 | End: 2023-11-15

## 2023-11-15 RX ORDER — DOSTARLIMAB 50 MG/ML
500 INJECTION INTRAVENOUS ONCE
Refills: 0 | Status: COMPLETED | OUTPATIENT
Start: 2023-11-15 | End: 2023-11-15

## 2023-11-15 RX ORDER — CARBOPLATIN 50 MG
533 VIAL (EA) INTRAVENOUS ONCE
Refills: 0 | Status: COMPLETED | OUTPATIENT
Start: 2023-11-15 | End: 2023-11-15

## 2023-11-15 RX ORDER — DIPHENHYDRAMINE HCL 50 MG
25 CAPSULE ORAL ONCE
Refills: 0 | Status: COMPLETED | OUTPATIENT
Start: 2023-11-15 | End: 2023-11-15

## 2023-11-15 RX ORDER — FOSAPREPITANT DIMEGLUMINE 150 MG/5ML
150 INJECTION, POWDER, LYOPHILIZED, FOR SOLUTION INTRAVENOUS ONCE
Refills: 0 | Status: COMPLETED | OUTPATIENT
Start: 2023-11-15 | End: 2023-11-15

## 2023-11-15 RX ORDER — DEXAMETHASONE 0.5 MG/5ML
12 ELIXIR ORAL ONCE
Refills: 0 | Status: COMPLETED | OUTPATIENT
Start: 2023-11-15 | End: 2023-11-15

## 2023-11-15 RX ORDER — FAMOTIDINE 10 MG/ML
20 INJECTION INTRAVENOUS ONCE
Refills: 0 | Status: COMPLETED | OUTPATIENT
Start: 2023-11-15 | End: 2023-11-15

## 2023-11-15 RX ORDER — PACLITAXEL 6 MG/ML
365 INJECTION, SOLUTION, CONCENTRATE INTRAVENOUS ONCE
Refills: 0 | Status: COMPLETED | OUTPATIENT
Start: 2023-11-15 | End: 2023-11-15

## 2023-11-15 RX ORDER — PEGFILGRASTIM-CBQV 6 MG/.6ML
6 INJECTION, SOLUTION SUBCUTANEOUS ONCE
Refills: 0 | Status: COMPLETED | OUTPATIENT
Start: 2023-11-15 | End: 2023-11-15

## 2023-11-15 RX ADMIN — Medication 533 MILLIGRAM(S): at 11:16

## 2023-11-15 RX ADMIN — Medication 12 MILLIGRAM(S): at 10:55

## 2023-11-15 RX ADMIN — FOSAPREPITANT DIMEGLUMINE 150 MILLIGRAM(S): 150 INJECTION, POWDER, LYOPHILIZED, FOR SOLUTION INTRAVENOUS at 11:55

## 2023-11-15 RX ADMIN — DOSTARLIMAB 500 MILLIGRAM(S): 50 INJECTION INTRAVENOUS at 12:05

## 2023-11-15 RX ADMIN — FOSAPREPITANT DIMEGLUMINE 500 MILLIGRAM(S): 150 INJECTION, POWDER, LYOPHILIZED, FOR SOLUTION INTRAVENOUS at 11:25

## 2023-11-15 RX ADMIN — FAMOTIDINE 20 MILLIGRAM(S): 10 INJECTION INTRAVENOUS at 11:10

## 2023-11-15 RX ADMIN — Medication 50 MILLIGRAM(S): at 11:25

## 2023-11-15 RX ADMIN — DOSTARLIMAB 500 MILLIGRAM(S): 50 INJECTION INTRAVENOUS at 12:35

## 2023-11-15 RX ADMIN — PEGFILGRASTIM-CBQV 6 MILLIGRAM(S): 6 INJECTION, SOLUTION SUBCUTANEOUS at 10:23

## 2023-11-15 RX ADMIN — Medication 25 MILLIGRAM(S): at 13:13

## 2023-11-15 RX ADMIN — PACLITAXEL 365 MILLIGRAM(S): 6 INJECTION, SOLUTION, CONCENTRATE INTRAVENOUS at 12:59

## 2023-11-15 RX ADMIN — Medication 122 MILLIGRAM(S): at 10:40

## 2023-11-15 RX ADMIN — Medication 102 MILLIGRAM(S): at 11:10

## 2023-11-15 RX ADMIN — FAMOTIDINE 104 MILLIGRAM(S): 10 INJECTION INTRAVENOUS at 10:55

## 2023-11-16 ENCOUNTER — NON-APPOINTMENT (OUTPATIENT)
Age: 63
End: 2023-11-16

## 2023-11-16 DIAGNOSIS — C54.1 MALIGNANT NEOPLASM OF ENDOMETRIUM: ICD-10-CM

## 2023-12-01 ENCOUNTER — APPOINTMENT (OUTPATIENT)
Dept: INTERVENTIONAL RADIOLOGY/VASCULAR | Facility: CLINIC | Age: 63
End: 2023-12-01
Payer: COMMERCIAL

## 2023-12-01 VITALS
SYSTOLIC BLOOD PRESSURE: 133 MMHG | HEART RATE: 95 BPM | OXYGEN SATURATION: 99 % | TEMPERATURE: 98.3 F | DIASTOLIC BLOOD PRESSURE: 65 MMHG

## 2023-12-01 PROCEDURE — 99212 OFFICE O/P EST SF 10 MIN: CPT

## 2023-12-05 ENCOUNTER — APPOINTMENT (OUTPATIENT)
Dept: HEMATOLOGY ONCOLOGY | Facility: CLINIC | Age: 63
End: 2023-12-05
Payer: COMMERCIAL

## 2023-12-05 ENCOUNTER — LABORATORY RESULT (OUTPATIENT)
Age: 63
End: 2023-12-05

## 2023-12-05 ENCOUNTER — OUTPATIENT (OUTPATIENT)
Dept: OUTPATIENT SERVICES | Facility: HOSPITAL | Age: 63
LOS: 1 days | End: 2023-12-05
Payer: COMMERCIAL

## 2023-12-05 VITALS
DIASTOLIC BLOOD PRESSURE: 72 MMHG | WEIGHT: 223 LBS | RESPIRATION RATE: 15 BRPM | HEIGHT: 64 IN | BODY MASS INDEX: 38.07 KG/M2 | HEART RATE: 86 BPM | SYSTOLIC BLOOD PRESSURE: 139 MMHG

## 2023-12-05 DIAGNOSIS — Z98.890 OTHER SPECIFIED POSTPROCEDURAL STATES: Chronic | ICD-10-CM

## 2023-12-05 DIAGNOSIS — Z90.49 ACQUIRED ABSENCE OF OTHER SPECIFIED PARTS OF DIGESTIVE TRACT: Chronic | ICD-10-CM

## 2023-12-05 DIAGNOSIS — C54.1 MALIGNANT NEOPLASM OF ENDOMETRIUM: ICD-10-CM

## 2023-12-05 DIAGNOSIS — I35.0 NONRHEUMATIC AORTIC (VALVE) STENOSIS: ICD-10-CM

## 2023-12-05 LAB
HCT VFR BLD CALC: 30.5 %
HGB BLD-MCNC: 10 G/DL
MCHC RBC-ENTMCNC: 25.3 PG
MCHC RBC-ENTMCNC: 32.8 G/DL
MCV RBC AUTO: 77.2 FL
PLATELET # BLD AUTO: 191 K/UL
PMV BLD: 10.7 FL
RBC # BLD: 3.95 M/UL
RBC # FLD: 14.8 %
WBC # FLD AUTO: 8.5 K/UL

## 2023-12-05 PROCEDURE — 85027 COMPLETE CBC AUTOMATED: CPT

## 2023-12-05 PROCEDURE — 99215 OFFICE O/P EST HI 40 MIN: CPT

## 2023-12-05 RX ORDER — DEXAMETHASONE 4 MG/1
4 TABLET ORAL
Qty: 50 | Refills: 0 | Status: ACTIVE | COMMUNITY
Start: 2023-12-05 | End: 1900-01-01

## 2023-12-05 RX ADMIN — DEXAMETHASONE 0 MG: 4 TABLET ORAL at 00:00

## 2023-12-06 ENCOUNTER — APPOINTMENT (OUTPATIENT)
Dept: GYNECOLOGIC ONCOLOGY | Facility: CLINIC | Age: 63
End: 2023-12-06
Payer: COMMERCIAL

## 2023-12-06 VITALS
SYSTOLIC BLOOD PRESSURE: 144 MMHG | BODY MASS INDEX: 38.07 KG/M2 | DIASTOLIC BLOOD PRESSURE: 68 MMHG | HEART RATE: 93 BPM | HEIGHT: 64 IN | WEIGHT: 223 LBS

## 2023-12-06 DIAGNOSIS — C54.1 MALIGNANT NEOPLASM OF ENDOMETRIUM: ICD-10-CM

## 2023-12-06 DIAGNOSIS — R92.8 OTHER ABNORMAL AND INCONCLUSIVE FINDINGS ON DIAGNOSTIC IMAGING OF BREAST: ICD-10-CM

## 2023-12-06 DIAGNOSIS — L24.A9 IRRITANT CONT DERMATITIS DUE FRICTION OR CONT W OTHER SPECIFIED BODY FLUIDS: ICD-10-CM

## 2023-12-06 PROCEDURE — 99024 POSTOP FOLLOW-UP VISIT: CPT

## 2023-12-07 ENCOUNTER — OUTPATIENT (OUTPATIENT)
Dept: OUTPATIENT SERVICES | Facility: HOSPITAL | Age: 63
LOS: 1 days | End: 2023-12-07
Payer: COMMERCIAL

## 2023-12-07 ENCOUNTER — APPOINTMENT (OUTPATIENT)
Dept: INFUSION THERAPY | Facility: CLINIC | Age: 63
End: 2023-12-07

## 2023-12-07 ENCOUNTER — APPOINTMENT (OUTPATIENT)
Dept: INTERVENTIONAL RADIOLOGY/VASCULAR | Facility: CLINIC | Age: 63
End: 2023-12-07

## 2023-12-07 VITALS — DIASTOLIC BLOOD PRESSURE: 72 MMHG | SYSTOLIC BLOOD PRESSURE: 156 MMHG | TEMPERATURE: 97 F | HEART RATE: 104 BPM

## 2023-12-07 DIAGNOSIS — Z98.890 OTHER SPECIFIED POSTPROCEDURAL STATES: Chronic | ICD-10-CM

## 2023-12-07 DIAGNOSIS — Z90.49 ACQUIRED ABSENCE OF OTHER SPECIFIED PARTS OF DIGESTIVE TRACT: Chronic | ICD-10-CM

## 2023-12-07 DIAGNOSIS — C54.1 MALIGNANT NEOPLASM OF ENDOMETRIUM: ICD-10-CM

## 2023-12-07 PROCEDURE — 82728 ASSAY OF FERRITIN: CPT

## 2023-12-07 PROCEDURE — 96415 CHEMO IV INFUSION ADDL HR: CPT

## 2023-12-07 PROCEDURE — 36415 COLL VENOUS BLD VENIPUNCTURE: CPT

## 2023-12-07 PROCEDURE — 83550 IRON BINDING TEST: CPT

## 2023-12-07 PROCEDURE — 96375 TX/PRO/DX INJ NEW DRUG ADDON: CPT

## 2023-12-07 PROCEDURE — 83540 ASSAY OF IRON: CPT

## 2023-12-07 PROCEDURE — 86304 IMMUNOASSAY TUMOR CA 125: CPT

## 2023-12-07 PROCEDURE — 96417 CHEMO IV INFUS EACH ADDL SEQ: CPT

## 2023-12-07 PROCEDURE — 80053 COMPREHEN METABOLIC PANEL: CPT

## 2023-12-07 PROCEDURE — 96367 TX/PROPH/DG ADDL SEQ IV INF: CPT

## 2023-12-07 PROCEDURE — 96413 CHEMO IV INFUSION 1 HR: CPT

## 2023-12-07 RX ORDER — DIPHENHYDRAMINE HCL 50 MG
50 CAPSULE ORAL ONCE
Refills: 0 | Status: COMPLETED | OUTPATIENT
Start: 2023-12-07 | End: 2023-12-07

## 2023-12-07 RX ORDER — DOSTARLIMAB 50 MG/ML
500 INJECTION INTRAVENOUS ONCE
Refills: 0 | Status: COMPLETED | OUTPATIENT
Start: 2023-12-07 | End: 2023-12-07

## 2023-12-07 RX ORDER — DEXAMETHASONE 0.5 MG/5ML
12 ELIXIR ORAL ONCE
Refills: 0 | Status: COMPLETED | OUTPATIENT
Start: 2023-12-07 | End: 2023-12-07

## 2023-12-07 RX ORDER — FOSAPREPITANT DIMEGLUMINE 150 MG/5ML
150 INJECTION, POWDER, LYOPHILIZED, FOR SOLUTION INTRAVENOUS ONCE
Refills: 0 | Status: COMPLETED | OUTPATIENT
Start: 2023-12-07 | End: 2023-12-07

## 2023-12-07 RX ORDER — PACLITAXEL 6 MG/ML
365 INJECTION, SOLUTION, CONCENTRATE INTRAVENOUS ONCE
Refills: 0 | Status: COMPLETED | OUTPATIENT
Start: 2023-12-07 | End: 2023-12-07

## 2023-12-07 RX ORDER — FAMOTIDINE 10 MG/ML
20 INJECTION INTRAVENOUS ONCE
Refills: 0 | Status: COMPLETED | OUTPATIENT
Start: 2023-12-07 | End: 2023-12-07

## 2023-12-07 RX ORDER — CARBOPLATIN 50 MG
533 VIAL (EA) INTRAVENOUS ONCE
Refills: 0 | Status: COMPLETED | OUTPATIENT
Start: 2023-12-07 | End: 2023-12-07

## 2023-12-07 RX ADMIN — FOSAPREPITANT DIMEGLUMINE 150 MILLIGRAM(S): 150 INJECTION, POWDER, LYOPHILIZED, FOR SOLUTION INTRAVENOUS at 12:00

## 2023-12-07 RX ADMIN — Medication 102 MILLIGRAM(S): at 11:35

## 2023-12-07 RX ADMIN — FOSAPREPITANT DIMEGLUMINE 500 MILLIGRAM(S): 150 INJECTION, POWDER, LYOPHILIZED, FOR SOLUTION INTRAVENOUS at 10:27

## 2023-12-07 RX ADMIN — FAMOTIDINE 20 MILLIGRAM(S): 10 INJECTION INTRAVENOUS at 11:20

## 2023-12-07 RX ADMIN — DOSTARLIMAB 500 MILLIGRAM(S): 50 INJECTION INTRAVENOUS at 11:55

## 2023-12-07 RX ADMIN — PACLITAXEL 365 MILLIGRAM(S): 6 INJECTION, SOLUTION, CONCENTRATE INTRAVENOUS at 16:15

## 2023-12-07 RX ADMIN — PACLITAXEL 365 MILLIGRAM(S): 6 INJECTION, SOLUTION, CONCENTRATE INTRAVENOUS at 12:35

## 2023-12-07 RX ADMIN — FAMOTIDINE 104 MILLIGRAM(S): 10 INJECTION INTRAVENOUS at 11:20

## 2023-12-07 RX ADMIN — Medication 533 MILLIGRAM(S): at 16:20

## 2023-12-07 RX ADMIN — Medication 12 MILLIGRAM(S): at 11:20

## 2023-12-07 RX ADMIN — Medication 533 MILLIGRAM(S): at 17:20

## 2023-12-07 RX ADMIN — Medication 122 MILLIGRAM(S): at 11:05

## 2023-12-07 RX ADMIN — DOSTARLIMAB 500 MILLIGRAM(S): 50 INJECTION INTRAVENOUS at 12:25

## 2023-12-07 RX ADMIN — Medication 50 MILLIGRAM(S): at 11:50

## 2023-12-11 ENCOUNTER — NON-APPOINTMENT (OUTPATIENT)
Age: 63
End: 2023-12-11

## 2023-12-14 LAB
ALBUMIN SERPL ELPH-MCNC: 4.4 G/DL
ALBUMIN SERPL ELPH-MCNC: 4.5 G/DL
ALP BLD-CCNC: 78 U/L
ALP BLD-CCNC: 79 U/L
ALT SERPL-CCNC: 7 U/L
ALT SERPL-CCNC: 8 U/L
ANION GAP SERPL CALC-SCNC: 17 MMOL/L
ANION GAP SERPL CALC-SCNC: 17 MMOL/L
AST SERPL-CCNC: 10 U/L
AST SERPL-CCNC: 8 U/L
BILIRUB SERPL-MCNC: <0.2 MG/DL
BILIRUB SERPL-MCNC: <0.2 MG/DL
BUN SERPL-MCNC: 30 MG/DL
BUN SERPL-MCNC: 31 MG/DL
CALCIUM SERPL-MCNC: 9.5 MG/DL
CALCIUM SERPL-MCNC: 9.5 MG/DL
CANCER AG125 SERPL-ACNC: 9 U/ML
CHLORIDE SERPL-SCNC: 100 MMOL/L
CHLORIDE SERPL-SCNC: 100 MMOL/L
CO2 SERPL-SCNC: 22 MMOL/L
CO2 SERPL-SCNC: 23 MMOL/L
CREAT SERPL-MCNC: 1.1 MG/DL
CREAT SERPL-MCNC: 1.1 MG/DL
EGFR: 57 ML/MIN/1.73M2
EGFR: 57 ML/MIN/1.73M2
FERRITIN SERPL-MCNC: 222 NG/ML
GLUCOSE SERPL-MCNC: 175 MG/DL
GLUCOSE SERPL-MCNC: 178 MG/DL
IRON SATN MFR SERPL: 15 %
IRON SERPL-MCNC: 41 UG/DL
POTASSIUM SERPL-SCNC: 3.9 MMOL/L
POTASSIUM SERPL-SCNC: 4.1 MMOL/L
PROT SERPL-MCNC: 7.3 G/DL
PROT SERPL-MCNC: 7.8 G/DL
SODIUM SERPL-SCNC: 139 MMOL/L
SODIUM SERPL-SCNC: 140 MMOL/L
TIBC SERPL-MCNC: 274 UG/DL
UIBC SERPL-MCNC: 233 UG/DL

## 2023-12-15 PROBLEM — I35.0 AORTIC STENOSIS: Status: ACTIVE | Noted: 2021-02-24

## 2023-12-15 NOTE — CONSULT LETTER
[Dear  ___] : Dear  [unfilled], [Consult Letter:] : I had the pleasure of evaluating your patient, [unfilled]. [Please see my note below.] : Please see my note below. [Consult Closing:] : Thank you very much for allowing me to participate in the care of this patient.  If you have any questions, please do not hesitate to contact me. [Sincerely,] : Sincerely, [DrKevin  ___] : Dr. ANDREWS [FreeTextEntry3] : Tammy Henry MD  [facility] Attending Physician [facility] [phone]

## 2023-12-15 NOTE — REVIEW OF SYSTEMS
[Negative] : Endocrine [Fever] : no fever [Chills] : no chills [Chest Pain] : no chest pain [Palpitations] : no palpitations [Shortness Of Breath] : no shortness of breath [Wheezing] : no wheezing [Dysuria] : no dysuria [Incontinence] : no incontinence [Joint Pain] : no joint pain [Joint Stiffness] : no joint stiffness [de-identified] : mid abdominal scar with some discharge near umblicus

## 2023-12-15 NOTE — PHYSICAL EXAM
[Restricted in physically strenuous activity but ambulatory and able to carry out work of a light or sedentary nature] : Status 1- Restricted in physically strenuous activity but ambulatory and able to carry out work of a light or sedentary nature, e.g., light house work, office work [Obese] : obese [Normal] : normal appearance, no rash, nodules, vesicles, ulcers, erythema [de-identified] : mid abdominal scar. healing well, with granulation tissue around umblicus, mild dehiscence with clear DC

## 2023-12-15 NOTE — REASON FOR VISIT
[Initial Consultation] : an initial consultation [Family Member] : family member [FreeTextEntry2] : Uterine cancer

## 2023-12-15 NOTE — HISTORY OF PRESENT ILLNESS
[Disease: _____________________] : Disease: [unfilled] [T: ___] : T[unfilled] [N: ___] : N[unfilled] [M: ___] : M[unfilled] [AJCC Stage: ____] : AJCC Stage: [unfilled] [de-identified] : 12/5/23 Pt is s/p 1 cycle of chemo, (Taxol/carbo/dorsarlimab) tolerated well. Had some mild SE which were addressed.  [de-identified] : Ms Patel is a 62 year old AA female with PMHx of HTN, DM type II, Rt breast mass (fibroadenoma confirmed on biopsy), Hx of fibroids who presents for the initial consultation of her Stage IV Carcinosarcoma of the uterus.  Patient reports that she had menopause at age of 56. Since then she had been having intermittent vaginal bleeding. Initially it was attributed to vaginal dryness as it was associated with coitus. Later she switched her GYN and she was told that she had fibroids that caused her vaginal bleeding. She changed her GYN again, and this time she had TVS that showed thickened endometrium and fibroids. She was taken for D&C in the August 2023, and the biopsy came back as carcinosarcoma of uterus. She was seen by Gyn-Onc (Dr. Patino) who took her for ( Ro converted to Exlap/MRH/BSO/omentectomy/tumor debulking on 10/16/23 for carcinosarcoma of the endometrium).  Today, its been two weeks since her surgery and she has been recovering well. She reports that she is eating well, weight is stable, bowel habits are normal and denies any vaginal bleed. She has a mid-abdominal scar that that is having some foul-smelling discharge.   Family Hx is not significant She lives with her daughter and mother. 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 [de-identified] : Carcinosarcoma [de-identified] : CA-125 74

## 2023-12-15 NOTE — ASSESSMENT
[FreeTextEntry1] : In summary, Ms. Patel is a 62 year old AA female with PMHx of HTN, DM type II, Rt breast mass (fibroadenoma confirmed on biopsy), Hx of fibroids who presents for the initial consultation of her Stage IV Carcinosarcoma of the uterus.  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 There are multiple unfavorable histologic features incl carcinosarcoma histology, metastasis outside uterus and high grade in this case.  Patient tumor was found to be MMR deficient. In GODFREY trial,  among the 118 patients with dMMR, MSI-H tumors, 24-month PFS was 61 percent with dostarlimab versus 16 percent with placebo (HR 0.28, 95% CI, 0.16-0.50). OS at 24 months was 83 percent in the dostarlimab group and 59 percent in the placebo group (HR 0.30, 95% CI 0.13-0.70). Only 8.9% of the patient population had carcinosarcoma.   She started on chemotherapy consisting of Carboplatin AUC5 and Taxol 175 mg/m2 every 3 weeks. In addition she will receive Dostarlimab 500 mg IV every three weeks.   Side effect profile of carboplatin were discussed with pt including but not limited to: Decreased serum calcium, decreased serum magnesium, decreased serum potassium, decreased serum sodium, gastrointestinal pain, nausea and vomiting, anemia, leukopenia, neutropenia, thrombocytopenia, increased serum alkaline phosphatase, increased serum aspartate aminotransferase, decreased creatinine clearance, increased blood urea nitrogen, alopecia,  constipation, diarrhea, dysgeusia, stomatitis, hypersensitivity reaction, infection, neurotoxicity, peripheral neuropathy, ototoxicity. Side effect profile of taxol were discussed with pt including but not limited to: Low blood counts, risk for infection, anemia and/or bleeding, hair loss, arthralgias and myalgias, peripheral neuropathy, nausea and vomiting, diarrhea, mouth sores, hypersensitivity reaction. Side effect profile of Dostarlimab were discussed with ptincluding but not limited to: anemia, fatigue, hyperglycemia, hyponatremia, hypoalbuminemia, itching, cough, nausea, rash, decreased appetite, hypertriglyceridemia, increased liver enzymes, hypocalcemia, constipation, diarrhea, upper respiratory tract infection and an immune-mediated reaction. Lab work will check for elevated liver enzymes and thyroid function.  Plan:  - check baseline labs: CBC, CMP, Coags, TSH - Start treatment with carbo-Taxol-Dostarlimab ASAP. Due to recent surgery and abdominal wound, will add G-CSG on-body neulasta to the regimen - will send tumor for complete NGS profile. f/u on HER2 testing - referral given for chemo-port placement - send pt for CT chest/abdomen/pelvis w/ IV contrast for baseline   The high risk of complications and complexity associated with antineoplastic therapy administration has been explained to the pt and family. Chemotherapy will be given with adequate precautions including premedications, hydration and close monitoring during treatment.  Chemotherapy will be administered under my direct supervision    SUPPORTIVE MEASURES discussed:  Zofran 8 mg Q 8 hrs prn for emesis and nausea.  Compazine Q 6 hrs prn  Encourage adequate fluid intake  GI prophylaxis with PPI.  Probiotics  CT CAP from [facility] reviewed ( pelvic fluid collection likely post op seroma.  Imaging will be repeated after 3- 4 cycles  All of her questions and concerns were addressed to her satisfaction.  # Fibroadenoma of Rt Breast - She was diagnosed with Biopsy 05/2022 - She is due for mammogram; we will send her for Mammo once she finished her chemotherapy   RTC Prior to starting cycle-2.

## 2023-12-15 NOTE — HISTORY OF PRESENT ILLNESS
[Disease: _____________________] : Disease: [unfilled] [T: ___] : T[unfilled] [N: ___] : N[unfilled] [M: ___] : M[unfilled] [AJCC Stage: ____] : AJCC Stage: [unfilled] [de-identified] : 12/5/23 Pt is s/p 1 cycle of chemo, (Taxol/carbo/dorsarlimab) tolerated well. Had some mild SE which were addressed.  [de-identified] : Ms Patel is a 62 year old AA female with PMHx of HTN, DM type II, Rt breast mass (fibroadenoma confirmed on biopsy), Hx of fibroids who presents for the initial consultation of her Stage IV Carcinosarcoma of the uterus.  Patient reports that she had menopause at age of 56. Since then she had been having intermittent vaginal bleeding. Initially it was attributed to vaginal dryness as it was associated with coitus. Later she switched her GYN and she was told that she had fibroids that caused her vaginal bleeding. She changed her GYN again, and this time she had TVS that showed thickened endometrium and fibroids. She was taken for D&C in the August 2023, and the biopsy came back as carcinosarcoma of uterus. She was seen by Gyn-Onc (Dr. Patino) who took her for ( Ro converted to Exlap/MRH/BSO/omentectomy/tumor debulking on 10/16/23 for carcinosarcoma of the endometrium).  Today, its been two weeks since her surgery and she has been recovering well. She reports that she is eating well, weight is stable, bowel habits are normal and denies any vaginal bleed. She has a mid-abdominal scar that that is having some foul-smelling discharge.   Family Hx is not significant She lives with her daughter and mother. 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 [de-identified] : Carcinosarcoma [de-identified] : CA-125 74

## 2023-12-15 NOTE — CONSULT LETTER
[Dear  ___] : Dear  [unfilled], [Consult Letter:] : I had the pleasure of evaluating your patient, [unfilled]. [Please see my note below.] : Please see my note below. [Consult Closing:] : Thank you very much for allowing me to participate in the care of this patient.  If you have any questions, please do not hesitate to contact me. [Sincerely,] : Sincerely, [DrKevin  ___] : Dr. ANDREWS [FreeTextEntry3] : Tammy Henry MD  [facility] Attending Physician [facility] [phone]

## 2023-12-15 NOTE — REVIEW OF SYSTEMS
[Negative] : Endocrine [Fever] : no fever [Chills] : no chills [Chest Pain] : no chest pain [Palpitations] : no palpitations [Shortness Of Breath] : no shortness of breath [Wheezing] : no wheezing [Dysuria] : no dysuria [Incontinence] : no incontinence [Joint Pain] : no joint pain [Joint Stiffness] : no joint stiffness [de-identified] : mid abdominal scar with some discharge near umblicus

## 2023-12-15 NOTE — PHYSICAL EXAM
[Restricted in physically strenuous activity but ambulatory and able to carry out work of a light or sedentary nature] : Status 1- Restricted in physically strenuous activity but ambulatory and able to carry out work of a light or sedentary nature, e.g., light house work, office work [Obese] : obese [Normal] : normal appearance, no rash, nodules, vesicles, ulcers, erythema [de-identified] : mid abdominal scar. healing well, with granulation tissue around umblicus, mild dehiscence with clear DC

## 2023-12-15 NOTE — ASSESSMENT
08-Dec-2023 14:55 [FreeTextEntry1] : In summary, Ms. Patel is a 62 year old AA female with PMHx of HTN, DM type II, Rt breast mass (fibroadenoma confirmed on biopsy), Hx of fibroids who presents for the initial consultation of her Stage IV Carcinosarcoma of the uterus.  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 There are multiple unfavorable histologic features incl carcinosarcoma histology, metastasis outside uterus and high grade in this case.  Patient tumor was found to be MMR deficient. In GODFREY trial,  among the 118 patients with dMMR, MSI-H tumors, 24-month PFS was 61 percent with dostarlimab versus 16 percent with placebo (HR 0.28, 95% CI, 0.16-0.50). OS at 24 months was 83 percent in the dostarlimab group and 59 percent in the placebo group (HR 0.30, 95% CI 0.13-0.70). Only 8.9% of the patient population had carcinosarcoma.   She started on chemotherapy consisting of Carboplatin AUC5 and Taxol 175 mg/m2 every 3 weeks. In addition she will receive Dostarlimab 500 mg IV every three weeks.   Side effect profile of carboplatin were discussed with pt including but not limited to: Decreased serum calcium, decreased serum magnesium, decreased serum potassium, decreased serum sodium, gastrointestinal pain, nausea and vomiting, anemia, leukopenia, neutropenia, thrombocytopenia, increased serum alkaline phosphatase, increased serum aspartate aminotransferase, decreased creatinine clearance, increased blood urea nitrogen, alopecia,  constipation, diarrhea, dysgeusia, stomatitis, hypersensitivity reaction, infection, neurotoxicity, peripheral neuropathy, ototoxicity. Side effect profile of taxol were discussed with pt including but not limited to: Low blood counts, risk for infection, anemia and/or bleeding, hair loss, arthralgias and myalgias, peripheral neuropathy, nausea and vomiting, diarrhea, mouth sores, hypersensitivity reaction. Side effect profile of Dostarlimab were discussed with ptincluding but not limited to: anemia, fatigue, hyperglycemia, hyponatremia, hypoalbuminemia, itching, cough, nausea, rash, decreased appetite, hypertriglyceridemia, increased liver enzymes, hypocalcemia, constipation, diarrhea, upper respiratory tract infection and an immune-mediated reaction. Lab work will check for elevated liver enzymes and thyroid function.  Plan:  - check baseline labs: CBC, CMP, Coags, TSH - Start treatment with carbo-Taxol-Dostarlimab ASAP. Due to recent surgery and abdominal wound, will add G-CSG on-body neulasta to the regimen - will send tumor for complete NGS profile. f/u on HER2 testing - referral given for chemo-port placement - send pt for CT chest/abdomen/pelvis w/ IV contrast for baseline   The high risk of complications and complexity associated with antineoplastic therapy administration has been explained to the pt and family. Chemotherapy will be given with adequate precautions including premedications, hydration and close monitoring during treatment.  Chemotherapy will be administered under my direct supervision    SUPPORTIVE MEASURES discussed:  Zofran 8 mg Q 8 hrs prn for emesis and nausea.  Compazine Q 6 hrs prn  Encourage adequate fluid intake  GI prophylaxis with PPI.  Probiotics  CT CAP from [facility] reviewed ( pelvic fluid collection likely post op seroma.  Imaging will be repeated after 3- 4 cycles  All of her questions and concerns were addressed to her satisfaction.  # Fibroadenoma of Rt Breast - She was diagnosed with Biopsy 05/2022 - She is due for mammogram; we will send her for Mammo once she finished her chemotherapy   RTC Prior to starting cycle-2.

## 2023-12-26 ENCOUNTER — LABORATORY RESULT (OUTPATIENT)
Age: 63
End: 2023-12-26

## 2023-12-26 ENCOUNTER — APPOINTMENT (OUTPATIENT)
Dept: HEMATOLOGY ONCOLOGY | Facility: CLINIC | Age: 63
End: 2023-12-26
Payer: COMMERCIAL

## 2023-12-26 ENCOUNTER — OUTPATIENT (OUTPATIENT)
Dept: OUTPATIENT SERVICES | Facility: HOSPITAL | Age: 63
LOS: 1 days | End: 2023-12-26
Payer: COMMERCIAL

## 2023-12-26 VITALS
DIASTOLIC BLOOD PRESSURE: 70 MMHG | SYSTOLIC BLOOD PRESSURE: 157 MMHG | BODY MASS INDEX: 37.56 KG/M2 | HEART RATE: 96 BPM | TEMPERATURE: 97.1 F | HEIGHT: 64 IN | RESPIRATION RATE: 15 BRPM | WEIGHT: 220 LBS

## 2023-12-26 DIAGNOSIS — C54.1 MALIGNANT NEOPLASM OF ENDOMETRIUM: ICD-10-CM

## 2023-12-26 DIAGNOSIS — Z98.890 OTHER SPECIFIED POSTPROCEDURAL STATES: Chronic | ICD-10-CM

## 2023-12-26 DIAGNOSIS — Z90.49 ACQUIRED ABSENCE OF OTHER SPECIFIED PARTS OF DIGESTIVE TRACT: Chronic | ICD-10-CM

## 2023-12-26 PROCEDURE — 99214 OFFICE O/P EST MOD 30 MIN: CPT

## 2023-12-26 PROCEDURE — 85027 COMPLETE CBC AUTOMATED: CPT

## 2023-12-26 NOTE — ASSESSMENT
[FreeTextEntry1] : In summary, Ms. Patel is a 63 year old AA female with PMHx of HTN, DM type II, Rt breast mass (fibroadenoma confirmed on biopsy), Hx of fibroids who presents for the initial consultation of her Stage IV Carcinosarcoma of the uterus.  # Stage IV Carcinosarcoma of the uterus # dMMR ([id number])  - pT3a, Nx, M1 (biopsy proven omental implants) Biomarker Findings Microsatellite status - MS-Stable Tumor Mutational [place] - 4 Muts/Mb Genomic Findings For a complete list of the genes assayed, please refer to the Foundation report BRD4 amplification NOTCH3 amplification, NOTCH3-ZNF90 fusion TP53 Y234C  I had a detailed discussion with the pt regarding the natural history, treatment and prognosis of uterine cancer. There are multiple unfavorable histologic features incl carcinosarcoma histology, metastasis outside uterus and high grade in this case.  Patient tumor was found to be MMR deficient. In GODFREY trial, among the 118 patients with dMMR, MSI-H tumors, 24-month PFS was 61 percent with Dostarlimab versus 16 percent with placebo (HR 0.28, 95% CI, 0.16-0.50). OS at 24 months was 83 percent in the Dostarlimab group and 59 percent in the placebo group (HR 0.30, 95% CI 0.13-0.70). Only 8.9% of the patient population had carcinosarcoma.   She started on chemotherapy consisting of Carboplatin AUC5 and Taxol 175 mg/m2 with Dostarlimab 500 mg IV every three weeks on 11/15/23.  RECOMMENDATIONS: Previous notes reviewed and all relevant laboratory and radiology results and were communicated to the patient.  Continue cycle #3 of Carboplatin AUC5, Taxol 175 mg/m2, Dostarlimab 500 mg IV every 3 weeks. CBC today reviewed and is adequate. Moderate anemia noted. *** Side effect profile of Carboplatin were discussed with pt including but not limited to: Decreased serum calcium, decreased serum magnesium, decreased serum potassium, decreased serum sodium, gastrointestinal pain, nausea and vomiting, anemia, leukopenia, neutropenia, thrombocytopenia, increased serum alkaline phosphatase, increased serum aspartate aminotransferase, decreased creatinine clearance, increased blood urea nitrogen, alopecia,  constipation, diarrhea, dysgeusia, stomatitis, hypersensitivity reaction, infection, neurotoxicity, peripheral neuropathy, ototoxicity. *** Side effect profile of Taxol were discussed with pt including but not limited to: Low blood counts, risk for infection, anemia and/or bleeding, hair loss, arthralgias and myalgias, peripheral neuropathy, nausea and vomiting, diarrhea, mouth sores, hypersensitivity reaction. *** Side effect profile of Dostarlimab were discussed with pt including but not limited to: anemia, fatigue, hyperglycemia, hyponatremia, hypoalbuminemia, itching, cough, nausea, rash, decreased appetite, hypertriglyceridemia, increased liver enzymes, hypocalcemia, constipation, diarrhea, upper respiratory tract infection and an immune-mediated reaction. Lab work will check for elevated liver enzymes and thyroid function.  The high risk of complications and complexity associated with antineoplastic therapy administration has been explained to the pt and family. Chemotherapy will be given with adequate precautions including premedications, hydration and close monitoring during treatment.  Chemotherapy will be administered under my direct supervision. -- SUPPORTIVE MEASURES discussed: Zofran 8 mg Q 8 hrs prn for emesis and nausea. Compazine Q 6 hrs prn. Encourage adequate fluid intake. GI prophylaxis with PPI. Probiotics -- Labs: CMP, . -- Repeat CBC 1 week prior to next cycle of chemo. -- Continue to follow up with PCP, GI for health maintenance and screenings.  #Microcytic anemia -- Ferritin and TIBC on 12/7/23 are benign. -- Will continue to monitor.  #CT CAP from [facility] reviewed (pelvic fluid collection likely post op seroma. -- Imaging will be repeated after 3- 4 cycles.  # Fibroadenoma of Right Breast - She was diagnosed with Biopsy 05/2022. - She is due for mammogram; we will send her for Mammo once she finished her chemotherapy  RTC in 3 weeks

## 2023-12-26 NOTE — PHYSICAL EXAM
[Restricted in physically strenuous activity but ambulatory and able to carry out work of a light or sedentary nature] : Status 1- Restricted in physically strenuous activity but ambulatory and able to carry out work of a light or sedentary nature, e.g., light house work, office work [Obese] : obese [Normal] : well developed, well nourished, in no acute distress [de-identified] : abdominal surgical incision healed, no tenderness noted. [de-identified] : Right chest port-A-cath intact, no erythema or tenderness noted.

## 2023-12-26 NOTE — REVIEW OF SYSTEMS
[Diarrhea: Grade 0] : Diarrhea: Grade 0 [Negative] : Genitourinary [Fever] : no fever [Chills] : no chills [Chest Pain] : no chest pain [Palpitations] : no palpitations [Shortness Of Breath] : no shortness of breath [Wheezing] : no wheezing [Dysuria] : no dysuria [Joint Pain] : no joint pain [Incontinence] : no incontinence [Joint Stiffness] : no joint stiffness [FreeTextEntry7] : abdominal surgical incision healed, no tenderness noted. [FreeTextEntry9] : stiffness of fingers 2 days after cycle #2, improved with warm compresses. [de-identified] : abdominal surgical incision healed, no tenderness noted. Alopecia

## 2023-12-26 NOTE — HISTORY OF PRESENT ILLNESS
[Disease: _____________________] : Disease: [unfilled] [T: ___] : T[unfilled] [N: ___] : N[unfilled] [M: ___] : M[unfilled] [AJCC Stage: ____] : AJCC Stage: [unfilled] [de-identified] : Ms Patel is a 62 year old AA female with PMHx of HTN, DM type II, Rt breast mass (fibroadenoma confirmed on biopsy), Hx of fibroids who presents for the initial consultation of her Stage IV Carcinosarcoma of the uterus.  Patient reports that she had menopause at age of 56. Since then she had been having intermittent vaginal bleeding. Initially it was attributed to vaginal dryness as it was associated with coitus. Later she switched her GYN and she was told that she had fibroids that caused her vaginal bleeding. She changed her GYN again, and this time she had TVS that showed thickened endometrium and fibroids. She was taken for D&C in the August 2023, and the biopsy came back as carcinosarcoma of uterus. She was seen by Gyn-Onc (Dr. Patino) who took her for ( Ro converted to Exlap/MRH/BSO/omentectomy/tumor debulking on 10/16/23 for carcinosarcoma of the endometrium).  Today, its been two weeks since her surgery and she has been recovering well. She reports that she is eating well, weight is stable, bowel habits are normal and denies any vaginal bleed. She has a mid-abdominal scar that that is having some foul-smelling discharge.   Family Hx is not significant She lives with her daughter and mother. 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 [de-identified] : Carcinosarcoma [de-identified] : CA-125 74 [de-identified] : 12/5/23 Pt is s/p 1 cycle of chemo, (Taxol/carbo/dorsarlimab) tolerated well. Had some mild SE which were addressed.  12/26/23 Patient is here to follow up for endometrial cancer. S/P cycle #2 carbo/Taxol, tolerating well except for siffness of fingers post cycle #2 and cold feet. She feels well, denies abdominal pain, nausea, vomiting or abnormal vaginal bleeding or discharge, no fever or chills.

## 2023-12-26 NOTE — CONSULT LETTER
[Dear  ___] : Dear  [unfilled], [Consult Letter:] : I had the pleasure of evaluating your patient, [unfilled]. [Please see my note below.] : Please see my note below. [Consult Closing:] : Thank you very much for allowing me to participate in the care of this patient.  If you have any questions, please do not hesitate to contact me. [Sincerely,] : Sincerely, [DrKevin  ___] : Dr. ANDREWS [FreeTextEntry3] : Tmamy Henry MD  [facility] Attending Physician [facility] [phone]

## 2023-12-28 ENCOUNTER — OUTPATIENT (OUTPATIENT)
Dept: OUTPATIENT SERVICES | Facility: HOSPITAL | Age: 63
LOS: 1 days | End: 2023-12-28
Payer: COMMERCIAL

## 2023-12-28 ENCOUNTER — APPOINTMENT (OUTPATIENT)
Dept: INFUSION THERAPY | Facility: CLINIC | Age: 63
End: 2023-12-28

## 2023-12-28 VITALS
SYSTOLIC BLOOD PRESSURE: 129 MMHG | DIASTOLIC BLOOD PRESSURE: 62 MMHG | TEMPERATURE: 97.2 F | RESPIRATION RATE: 16 BRPM | HEART RATE: 99 BPM

## 2023-12-28 DIAGNOSIS — Z98.890 OTHER SPECIFIED POSTPROCEDURAL STATES: Chronic | ICD-10-CM

## 2023-12-28 DIAGNOSIS — C54.1 MALIGNANT NEOPLASM OF ENDOMETRIUM: ICD-10-CM

## 2023-12-28 DIAGNOSIS — Z90.49 ACQUIRED ABSENCE OF OTHER SPECIFIED PARTS OF DIGESTIVE TRACT: Chronic | ICD-10-CM

## 2023-12-28 PROCEDURE — 96417 CHEMO IV INFUS EACH ADDL SEQ: CPT

## 2023-12-28 PROCEDURE — 86304 IMMUNOASSAY TUMOR CA 125: CPT

## 2023-12-28 PROCEDURE — 96375 TX/PRO/DX INJ NEW DRUG ADDON: CPT

## 2023-12-28 PROCEDURE — 96367 TX/PROPH/DG ADDL SEQ IV INF: CPT

## 2023-12-28 PROCEDURE — 96413 CHEMO IV INFUSION 1 HR: CPT

## 2023-12-28 PROCEDURE — 36415 COLL VENOUS BLD VENIPUNCTURE: CPT

## 2023-12-28 PROCEDURE — 80053 COMPREHEN METABOLIC PANEL: CPT

## 2023-12-28 PROCEDURE — 96415 CHEMO IV INFUSION ADDL HR: CPT

## 2023-12-28 RX ORDER — FAMOTIDINE 10 MG/ML
20 INJECTION INTRAVENOUS ONCE
Refills: 0 | Status: COMPLETED | OUTPATIENT
Start: 2023-12-28 | End: 2023-12-28

## 2023-12-28 RX ORDER — CARBOPLATIN 50 MG
533 VIAL (EA) INTRAVENOUS ONCE
Refills: 0 | Status: COMPLETED | OUTPATIENT
Start: 2023-12-28 | End: 2023-12-28

## 2023-12-28 RX ORDER — PACLITAXEL 6 MG/ML
365 INJECTION, SOLUTION, CONCENTRATE INTRAVENOUS ONCE
Refills: 0 | Status: COMPLETED | OUTPATIENT
Start: 2023-12-28 | End: 2023-12-28

## 2023-12-28 RX ORDER — DIPHENHYDRAMINE HCL 50 MG
50 CAPSULE ORAL ONCE
Refills: 0 | Status: COMPLETED | OUTPATIENT
Start: 2023-12-28 | End: 2023-12-28

## 2023-12-28 RX ORDER — DEXAMETHASONE 0.5 MG/5ML
12 ELIXIR ORAL ONCE
Refills: 0 | Status: COMPLETED | OUTPATIENT
Start: 2023-12-28 | End: 2023-12-28

## 2023-12-28 RX ORDER — DOSTARLIMAB 50 MG/ML
500 INJECTION INTRAVENOUS ONCE
Refills: 0 | Status: COMPLETED | OUTPATIENT
Start: 2023-12-28 | End: 2023-12-28

## 2023-12-28 RX ORDER — FOSAPREPITANT DIMEGLUMINE 150 MG/5ML
150 INJECTION, POWDER, LYOPHILIZED, FOR SOLUTION INTRAVENOUS ONCE
Refills: 0 | Status: COMPLETED | OUTPATIENT
Start: 2023-12-28 | End: 2023-12-28

## 2023-12-28 RX ADMIN — FAMOTIDINE 104 MILLIGRAM(S): 10 INJECTION INTRAVENOUS at 12:25

## 2023-12-28 RX ADMIN — Medication 533 MILLIGRAM(S): at 18:05

## 2023-12-28 RX ADMIN — Medication 12 MILLIGRAM(S): at 12:20

## 2023-12-28 RX ADMIN — Medication 102 MILLIGRAM(S): at 12:45

## 2023-12-28 RX ADMIN — FOSAPREPITANT DIMEGLUMINE 150 MILLIGRAM(S): 150 INJECTION, POWDER, LYOPHILIZED, FOR SOLUTION INTRAVENOUS at 12:00

## 2023-12-28 RX ADMIN — Medication 50 MILLIGRAM(S): at 13:05

## 2023-12-28 RX ADMIN — FAMOTIDINE 20 MILLIGRAM(S): 10 INJECTION INTRAVENOUS at 12:45

## 2023-12-28 RX ADMIN — Medication 122 MILLIGRAM(S): at 12:05

## 2023-12-28 RX ADMIN — Medication 533 MILLIGRAM(S): at 17:05

## 2023-12-28 RX ADMIN — PACLITAXEL 365 MILLIGRAM(S): 6 INJECTION, SOLUTION, CONCENTRATE INTRAVENOUS at 14:00

## 2023-12-28 RX ADMIN — DOSTARLIMAB 500 MILLIGRAM(S): 50 INJECTION INTRAVENOUS at 13:40

## 2023-12-28 RX ADMIN — PACLITAXEL 365 MILLIGRAM(S): 6 INJECTION, SOLUTION, CONCENTRATE INTRAVENOUS at 17:00

## 2023-12-28 RX ADMIN — FOSAPREPITANT DIMEGLUMINE 500 MILLIGRAM(S): 150 INJECTION, POWDER, LYOPHILIZED, FOR SOLUTION INTRAVENOUS at 11:27

## 2023-12-28 RX ADMIN — DOSTARLIMAB 500 MILLIGRAM(S): 50 INJECTION INTRAVENOUS at 13:10

## 2024-01-02 LAB — CANCER AG125 SERPL-ACNC: 6 U/ML

## 2024-01-08 ENCOUNTER — NON-APPOINTMENT (OUTPATIENT)
Age: 64
End: 2024-01-08

## 2024-01-08 LAB
ALBUMIN SERPL ELPH-MCNC: 4.4 G/DL
ALP BLD-CCNC: 80 U/L
ALT SERPL-CCNC: 8 U/L
ANION GAP SERPL CALC-SCNC: 17 MMOL/L
AST SERPL-CCNC: 10 U/L
BILIRUB SERPL-MCNC: <0.2 MG/DL
BUN SERPL-MCNC: 30 MG/DL
CALCIUM SERPL-MCNC: 9.2 MG/DL
CHLORIDE SERPL-SCNC: 98 MMOL/L
CO2 SERPL-SCNC: 24 MMOL/L
CREAT SERPL-MCNC: 1 MG/DL
EGFR: 64 ML/MIN/1.73M2
GLUCOSE SERPL-MCNC: 178 MG/DL
HCT VFR BLD CALC: 29.9 %
HGB BLD-MCNC: 9.7 G/DL
MCHC RBC-ENTMCNC: 25.3 PG
MCHC RBC-ENTMCNC: 32.4 G/DL
MCV RBC AUTO: 77.9 FL
PLATELET # BLD AUTO: 359 K/UL
PMV BLD: 9.9 FL
POTASSIUM SERPL-SCNC: 4.1 MMOL/L
PROT SERPL-MCNC: 7.2 G/DL
RBC # BLD: 3.84 M/UL
RBC # FLD: 16.4 %
SODIUM SERPL-SCNC: 139 MMOL/L
WBC # FLD AUTO: 5.96 K/UL

## 2024-01-11 ENCOUNTER — NON-APPOINTMENT (OUTPATIENT)
Age: 64
End: 2024-01-11

## 2024-01-11 ENCOUNTER — OUTPATIENT (OUTPATIENT)
Dept: OUTPATIENT SERVICES | Facility: HOSPITAL | Age: 64
LOS: 1 days | End: 2024-01-11
Payer: COMMERCIAL

## 2024-01-11 ENCOUNTER — LABORATORY RESULT (OUTPATIENT)
Age: 64
End: 2024-01-11

## 2024-01-11 ENCOUNTER — APPOINTMENT (OUTPATIENT)
Dept: HEMATOLOGY ONCOLOGY | Facility: CLINIC | Age: 64
End: 2024-01-11

## 2024-01-11 DIAGNOSIS — Z98.890 OTHER SPECIFIED POSTPROCEDURAL STATES: Chronic | ICD-10-CM

## 2024-01-11 DIAGNOSIS — Z90.49 ACQUIRED ABSENCE OF OTHER SPECIFIED PARTS OF DIGESTIVE TRACT: Chronic | ICD-10-CM

## 2024-01-11 DIAGNOSIS — C54.1 MALIGNANT NEOPLASM OF ENDOMETRIUM: ICD-10-CM

## 2024-01-11 LAB
HCT VFR BLD CALC: 29.8 %
HGB BLD-MCNC: 9.8 G/DL
MCHC RBC-ENTMCNC: 25.6 PG
MCHC RBC-ENTMCNC: 32.9 G/DL
MCV RBC AUTO: 77.8 FL
PLATELET # BLD AUTO: 256 K/UL
PMV BLD: NORMAL
RBC # BLD: 3.83 M/UL
RBC # FLD: 17.5 %
WBC # FLD AUTO: 5.48 K/UL

## 2024-01-11 PROCEDURE — 85027 COMPLETE CBC AUTOMATED: CPT

## 2024-01-11 PROCEDURE — 36416 COLLJ CAPILLARY BLOOD SPEC: CPT

## 2024-01-12 DIAGNOSIS — C54.1 MALIGNANT NEOPLASM OF ENDOMETRIUM: ICD-10-CM

## 2024-01-16 ENCOUNTER — LABORATORY RESULT (OUTPATIENT)
Age: 64
End: 2024-01-16

## 2024-01-16 ENCOUNTER — OUTPATIENT (OUTPATIENT)
Dept: OUTPATIENT SERVICES | Facility: HOSPITAL | Age: 64
LOS: 1 days | End: 2024-01-16
Payer: COMMERCIAL

## 2024-01-16 ENCOUNTER — APPOINTMENT (OUTPATIENT)
Dept: HEMATOLOGY ONCOLOGY | Facility: CLINIC | Age: 64
End: 2024-01-16
Payer: COMMERCIAL

## 2024-01-16 VITALS
TEMPERATURE: 97.6 F | WEIGHT: 226 LBS | DIASTOLIC BLOOD PRESSURE: 77 MMHG | HEART RATE: 97 BPM | SYSTOLIC BLOOD PRESSURE: 164 MMHG | BODY MASS INDEX: 38.58 KG/M2 | HEIGHT: 64 IN

## 2024-01-16 DIAGNOSIS — Z98.890 OTHER SPECIFIED POSTPROCEDURAL STATES: Chronic | ICD-10-CM

## 2024-01-16 DIAGNOSIS — Z90.49 ACQUIRED ABSENCE OF OTHER SPECIFIED PARTS OF DIGESTIVE TRACT: Chronic | ICD-10-CM

## 2024-01-16 DIAGNOSIS — C54.1 MALIGNANT NEOPLASM OF ENDOMETRIUM: ICD-10-CM

## 2024-01-16 DIAGNOSIS — D50.9 IRON DEFICIENCY ANEMIA, UNSPECIFIED: ICD-10-CM

## 2024-01-16 LAB
HCT VFR BLD CALC: 30.7 %
HGB BLD-MCNC: 10.1 G/DL
MCHC RBC-ENTMCNC: 25.6 PG
MCHC RBC-ENTMCNC: 32.9 G/DL
MCV RBC AUTO: 77.7 FL
PLATELET # BLD AUTO: 211 K/UL
PMV BLD: 10.9 FL
RBC # BLD: 3.95 M/UL
RBC # FLD: 17.6 %
WBC # FLD AUTO: 9.19 K/UL

## 2024-01-16 PROCEDURE — G2211 COMPLEX E/M VISIT ADD ON: CPT

## 2024-01-16 PROCEDURE — 99215 OFFICE O/P EST HI 40 MIN: CPT

## 2024-01-16 PROCEDURE — 85027 COMPLETE CBC AUTOMATED: CPT

## 2024-01-17 ENCOUNTER — APPOINTMENT (OUTPATIENT)
Dept: INFUSION THERAPY | Facility: CLINIC | Age: 64
End: 2024-01-17

## 2024-01-17 ENCOUNTER — OUTPATIENT (OUTPATIENT)
Dept: OUTPATIENT SERVICES | Facility: HOSPITAL | Age: 64
LOS: 1 days | End: 2024-01-17
Payer: COMMERCIAL

## 2024-01-17 DIAGNOSIS — C54.1 MALIGNANT NEOPLASM OF ENDOMETRIUM: ICD-10-CM

## 2024-01-17 DIAGNOSIS — Z98.890 OTHER SPECIFIED POSTPROCEDURAL STATES: Chronic | ICD-10-CM

## 2024-01-17 DIAGNOSIS — Z90.49 ACQUIRED ABSENCE OF OTHER SPECIFIED PARTS OF DIGESTIVE TRACT: Chronic | ICD-10-CM

## 2024-01-17 PROCEDURE — 96367 TX/PROPH/DG ADDL SEQ IV INF: CPT

## 2024-01-17 PROCEDURE — 36415 COLL VENOUS BLD VENIPUNCTURE: CPT

## 2024-01-17 PROCEDURE — 96413 CHEMO IV INFUSION 1 HR: CPT

## 2024-01-17 PROCEDURE — 80053 COMPREHEN METABOLIC PANEL: CPT

## 2024-01-17 PROCEDURE — 96415 CHEMO IV INFUSION ADDL HR: CPT

## 2024-01-17 PROCEDURE — 96417 CHEMO IV INFUS EACH ADDL SEQ: CPT

## 2024-01-17 PROCEDURE — 86304 IMMUNOASSAY TUMOR CA 125: CPT

## 2024-01-17 RX ORDER — DOSTARLIMAB 50 MG/ML
500 INJECTION INTRAVENOUS ONCE
Refills: 0 | Status: COMPLETED | OUTPATIENT
Start: 2024-01-17 | End: 2024-01-17

## 2024-01-17 RX ORDER — PACLITAXEL 6 MG/ML
365 INJECTION, SOLUTION, CONCENTRATE INTRAVENOUS ONCE
Refills: 0 | Status: COMPLETED | OUTPATIENT
Start: 2024-01-17 | End: 2024-01-17

## 2024-01-17 RX ORDER — DEXAMETHASONE 0.5 MG/5ML
12 ELIXIR ORAL ONCE
Refills: 0 | Status: COMPLETED | OUTPATIENT
Start: 2024-01-17 | End: 2024-01-17

## 2024-01-17 RX ORDER — DIPHENHYDRAMINE HCL 50 MG
50 CAPSULE ORAL ONCE
Refills: 0 | Status: COMPLETED | OUTPATIENT
Start: 2024-01-17 | End: 2024-01-17

## 2024-01-17 RX ORDER — CARBOPLATIN 50 MG
533 VIAL (EA) INTRAVENOUS ONCE
Refills: 0 | Status: COMPLETED | OUTPATIENT
Start: 2024-01-17 | End: 2024-01-17

## 2024-01-17 RX ORDER — FOSAPREPITANT DIMEGLUMINE 150 MG/5ML
150 INJECTION, POWDER, LYOPHILIZED, FOR SOLUTION INTRAVENOUS ONCE
Refills: 0 | Status: COMPLETED | OUTPATIENT
Start: 2024-01-17 | End: 2024-01-17

## 2024-01-17 RX ORDER — FAMOTIDINE 10 MG/ML
20 INJECTION INTRAVENOUS ONCE
Refills: 0 | Status: COMPLETED | OUTPATIENT
Start: 2024-01-17 | End: 2024-01-17

## 2024-01-17 RX ADMIN — FAMOTIDINE 104 MILLIGRAM(S): 10 INJECTION INTRAVENOUS at 11:13

## 2024-01-17 RX ADMIN — Medication 122 MILLIGRAM(S): at 11:13

## 2024-01-17 RX ADMIN — Medication 102 MILLIGRAM(S): at 11:14

## 2024-01-17 RX ADMIN — FOSAPREPITANT DIMEGLUMINE 500 MILLIGRAM(S): 150 INJECTION, POWDER, LYOPHILIZED, FOR SOLUTION INTRAVENOUS at 11:13

## 2024-01-17 RX ADMIN — PACLITAXEL 365 MILLIGRAM(S): 6 INJECTION, SOLUTION, CONCENTRATE INTRAVENOUS at 12:10

## 2024-01-17 RX ADMIN — DOSTARLIMAB 500 MILLIGRAM(S): 50 INJECTION INTRAVENOUS at 12:12

## 2024-01-17 RX ADMIN — Medication 533 MILLIGRAM(S): at 12:12

## 2024-01-18 LAB
ALBUMIN SERPL ELPH-MCNC: 4.5 G/DL
ALP BLD-CCNC: 75 U/L
ALT SERPL-CCNC: 9 U/L
ANION GAP SERPL CALC-SCNC: 17 MMOL/L
AST SERPL-CCNC: 10 U/L
BILIRUB SERPL-MCNC: <0.2 MG/DL
BUN SERPL-MCNC: 26 MG/DL
CALCIUM SERPL-MCNC: 9.5 MG/DL
CANCER AG125 SERPL-ACNC: 5 U/ML
CHLORIDE SERPL-SCNC: 101 MMOL/L
CO2 SERPL-SCNC: 24 MMOL/L
CREAT SERPL-MCNC: 1.1 MG/DL
EGFR: 57 ML/MIN/1.73M2
GLUCOSE SERPL-MCNC: 230 MG/DL
POTASSIUM SERPL-SCNC: 3.9 MMOL/L
PROT SERPL-MCNC: 7.3 G/DL
SODIUM SERPL-SCNC: 142 MMOL/L

## 2024-01-19 NOTE — HISTORY OF PRESENT ILLNESS
[Disease: _____________________] : Disease: [unfilled] [T: ___] : T[unfilled] [N: ___] : N[unfilled] [M: ___] : M[unfilled] [AJCC Stage: ____] : AJCC Stage: [unfilled] [de-identified] : Ms Patel is a 62 year old AA female with PMHx of HTN, DM type II, Rt breast mass (fibroadenoma confirmed on biopsy), Hx of fibroids who presents for the initial consultation of her Stage IV Carcinosarcoma of the uterus.  Patient reports that she had menopause at age of 56. Since then she had been having intermittent vaginal bleeding. Initially it was attributed to vaginal dryness as it was associated with coitus. Later she switched her GYN and she was told that she had fibroids that caused her vaginal bleeding. She changed her GYN again, and this time she had TVS that showed thickened endometrium and fibroids. She was taken for D&C in the August 2023, and the biopsy came back as carcinosarcoma of uterus. She was seen by Gyn-Onc (Dr. Patino) who took her for ( Ro converted to Exlap/MRH/BSO/omentectomy/tumor debulking on 10/16/23 for carcinosarcoma of the endometrium).  Today, its been two weeks since her surgery and she has been recovering well. She reports that she is eating well, weight is stable, bowel habits are normal and denies any vaginal bleed. She has a mid-abdominal scar that that is having some foul-smelling discharge.   Family Hx is not significant She lives with her daughter and mother. 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 [de-identified] : Carcinosarcoma [de-identified] : CA-125 74 [de-identified] : 12/5/23 Pt is s/p 1 cycle of chemo, (Taxol/carbo/dorsarlimab) tolerated well. Had some mild SE which were addressed.  12/26/23 Patient is here to follow up for endometrial cancer. S/P cycle #2 carbo/Taxol, tolerating well except for siffness of fingers post cycle #2 and cold feet. She feels well, denies abdominal pain, nausea, vomiting or abnormal vaginal bleeding or discharge, no fever or chills.  1/16/24 Patient is here to follow up for endometrial cancer. S/P cycle #3 carbo/Taxol and Dostarlimab, tolerating well except for cold feet. She feels well, denies abdominal pain, nausea, vomiting or abnormal vaginal bleeding or discharge, no fever or chills.

## 2024-01-19 NOTE — ASSESSMENT
[FreeTextEntry1] : In summary, Ms. Patel is a 63 year old AA female with PMHx of HTN, DM type II, Rt breast mass (fibroadenoma confirmed on biopsy), Hx of fibroids who presents for the initial consultation of her Stage IV Carcinosarcoma of the uterus.  # Stage IV Carcinosarcoma of the uterus # dMMR ([id number])  - pT3a, Nx, M1 (biopsy proven omental implants) Biomarker Findings Microsatellite status - MS-Stable Tumor Mutational [place] - 4 Muts/Mb Genomic Findings For a complete list of the genes assayed, please refer to the Foundation report BRD4 amplification NOTCH3 amplification, NOTCH3-ZNF90 fusion TP53 Y234C  I had a detailed discussion with the pt regarding the natural history, treatment and prognosis of uterine cancer. There are multiple unfavorable histologic features incl carcinosarcoma histology, metastasis outside uterus and high grade in this case.  Patient tumor was found to be MMR deficient. In GODFREY trial, among the 118 patients with dMMR, MSI-H tumors, 24-month PFS was 61 percent with Dostarlimab versus 16 percent with placebo (HR 0.28, 95% CI, 0.16-0.50). OS at 24 months was 83 percent in the Dostarlimab group and 59 percent in the placebo group (HR 0.30, 95% CI 0.13-0.70). Only 8.9% of the patient population had carcinosarcoma.   She started on chemotherapy consisting of Carboplatin AUC5 and Taxol 175 mg/m2 with Dostarlimab 500 mg IV every three weeks on 11/15/23.  RECOMMENDATIONS: Previous notes reviewed and all relevant laboratory and radiology results discussed with Dr Henry and were communicated to the patient.  Continue cycle #4 of Carboplatin AUC5, Taxol 175 mg/m2, Dostarlimab 500 mg IV every 3 weeks. CBC today reviewed and is adequate. Moderate anemia noted. *** Side effect profile of Carboplatin were discussed with pt including but not limited to: Decreased serum calcium, decreased serum magnesium, decreased serum potassium, decreased serum sodium, gastrointestinal pain, nausea and vomiting, anemia, leukopenia, neutropenia, thrombocytopenia, increased serum alkaline phosphatase, increased serum aspartate aminotransferase, decreased creatinine clearance, increased blood urea nitrogen, alopecia,  constipation, diarrhea, dysgeusia, stomatitis, hypersensitivity reaction, infection, neurotoxicity, peripheral neuropathy, ototoxicity. *** Side effect profile of Taxol were discussed with pt including but not limited to: Low blood counts, risk for infection, anemia and/or bleeding, hair loss, arthralgias and myalgias, peripheral neuropathy, nausea and vomiting, diarrhea, mouth sores, hypersensitivity reaction. *** Side effect profile of Dostarlimab were discussed with pt including but not limited to: anemia, fatigue, hyperglycemia, hyponatremia, hypoalbuminemia, itching, cough, nausea, rash, decreased appetite, hypertriglyceridemia, increased liver enzymes, hypocalcemia, constipation, diarrhea, upper respiratory tract infection and an immune-mediated reaction. Lab work will check for elevated liver enzymes and thyroid function.  The high risk of complications and complexity associated with antineoplastic therapy administration has been explained to the pt and family. Chemotherapy will be given with adequate precautions including premedications, hydration and close monitoring during treatment.  Chemotherapy will be administered under my direct supervision. -- SUPPORTIVE MEASURES discussed: Zofran 8 mg Q 8 hrs prn for emesis and nausea. Compazine Q 6 hrs prn. Encourage adequate fluid intake. GI prophylaxis with PPI. Probiotics -- Labs: CMP, . -- Port flush per protocol. -- Will do CT chest/abdomen/pelvis, script given. -- Continue to follow up with PCP, GI for health maintenance and screenings.  #Microcytic anemia -- Ferritin and TIBC on 12/7/23 are benign. -- Stable, will continue to monitor.  #CT CAP from [facility] reviewed (pelvic fluid collection likely post op seroma. -- Imaging will be repeated after 3- 4 cycles.  # Fibroadenoma of Right Breast - She was diagnosed with Biopsy 05/2022. - She is due for mammogram; we will send her for Mammo once she finished her chemotherapy  RTC in 3 weeks.  Case was seen and discussed with Dr. Henry who agreed with assessment and plan.

## 2024-01-19 NOTE — REVIEW OF SYSTEMS
[Diarrhea: Grade 0] : Diarrhea: Grade 0 [Negative] : Allergic/Immunologic [Fever] : no fever [Chills] : no chills [Chest Pain] : no chest pain [Palpitations] : no palpitations [Shortness Of Breath] : no shortness of breath [Wheezing] : no wheezing [Dysuria] : no dysuria [Incontinence] : no incontinence [Joint Pain] : no joint pain [Joint Stiffness] : no joint stiffness [FreeTextEntry7] : Abdominal surgical incision healed, no tenderness noted. [FreeTextEntry9] : Cold feet  [de-identified] : Abdominal surgical incision healed, no tenderness noted. Alopecia

## 2024-01-19 NOTE — PHYSICAL EXAM
[Restricted in physically strenuous activity but ambulatory and able to carry out work of a light or sedentary nature] : Status 1- Restricted in physically strenuous activity but ambulatory and able to carry out work of a light or sedentary nature, e.g., light house work, office work [Obese] : obese [Normal] : grossly intact [de-identified] : Abdominal surgical incision healed, no tenderness noted. [de-identified] : Right chest port-A-cath intact, no erythema or tenderness noted. Alopecia

## 2024-01-19 NOTE — CONSULT LETTER
[Dear  ___] : Dear  [unfilled], [Consult Letter:] : I had the pleasure of evaluating your patient, [unfilled]. [Please see my note below.] : Please see my note below. [Consult Closing:] : Thank you very much for allowing me to participate in the care of this patient.  If you have any questions, please do not hesitate to contact me. [Sincerely,] : Sincerely, [DrKevin  ___] : Dr. ANDREWS [FreeTextEntry3] : Tammy Henry MD  [facility] Attending Physician [facility] [phone]

## 2024-02-06 ENCOUNTER — LABORATORY RESULT (OUTPATIENT)
Age: 64
End: 2024-02-06

## 2024-02-06 ENCOUNTER — APPOINTMENT (OUTPATIENT)
Dept: HEMATOLOGY ONCOLOGY | Facility: CLINIC | Age: 64
End: 2024-02-06
Payer: COMMERCIAL

## 2024-02-06 ENCOUNTER — OUTPATIENT (OUTPATIENT)
Dept: OUTPATIENT SERVICES | Facility: HOSPITAL | Age: 64
LOS: 1 days | End: 2024-02-06
Payer: COMMERCIAL

## 2024-02-06 VITALS
TEMPERATURE: 97.2 F | HEIGHT: 64 IN | HEART RATE: 90 BPM | BODY MASS INDEX: 38.24 KG/M2 | SYSTOLIC BLOOD PRESSURE: 150 MMHG | WEIGHT: 224 LBS | DIASTOLIC BLOOD PRESSURE: 67 MMHG

## 2024-02-06 DIAGNOSIS — C54.1 MALIGNANT NEOPLASM OF ENDOMETRIUM: ICD-10-CM

## 2024-02-06 DIAGNOSIS — Z98.890 OTHER SPECIFIED POSTPROCEDURAL STATES: Chronic | ICD-10-CM

## 2024-02-06 DIAGNOSIS — Z90.49 ACQUIRED ABSENCE OF OTHER SPECIFIED PARTS OF DIGESTIVE TRACT: Chronic | ICD-10-CM

## 2024-02-06 LAB
HCT VFR BLD CALC: 30 %
HGB BLD-MCNC: 9.6 G/DL
MCHC RBC-ENTMCNC: 25.7 PG
MCHC RBC-ENTMCNC: 32 G/DL
MCV RBC AUTO: 80.2 FL
PLATELET # BLD AUTO: 319 K/UL
PMV BLD: 9.4 FL
RBC # BLD: 3.74 M/UL
RBC # FLD: 18.5 %
WBC # FLD AUTO: 5.94 K/UL

## 2024-02-06 PROCEDURE — 99215 OFFICE O/P EST HI 40 MIN: CPT

## 2024-02-06 PROCEDURE — G2211 COMPLEX E/M VISIT ADD ON: CPT

## 2024-02-06 PROCEDURE — 84439 ASSAY OF FREE THYROXINE: CPT

## 2024-02-06 PROCEDURE — 84443 ASSAY THYROID STIM HORMONE: CPT

## 2024-02-06 PROCEDURE — 86304 IMMUNOASSAY TUMOR CA 125: CPT

## 2024-02-06 PROCEDURE — 80053 COMPREHEN METABOLIC PANEL: CPT

## 2024-02-06 PROCEDURE — 36415 COLL VENOUS BLD VENIPUNCTURE: CPT

## 2024-02-06 PROCEDURE — 83735 ASSAY OF MAGNESIUM: CPT

## 2024-02-06 PROCEDURE — 85027 COMPLETE CBC AUTOMATED: CPT

## 2024-02-06 RX ORDER — MAGNESIUM OXIDE 500 MG
500 TABLET ORAL
Qty: 10 | Refills: 0 | Status: COMPLETED | COMMUNITY
Start: 2024-02-06 | End: 2024-02-16

## 2024-02-07 ENCOUNTER — APPOINTMENT (OUTPATIENT)
Dept: INFUSION THERAPY | Facility: CLINIC | Age: 64
End: 2024-02-07

## 2024-02-07 ENCOUNTER — OUTPATIENT (OUTPATIENT)
Dept: OUTPATIENT SERVICES | Facility: HOSPITAL | Age: 64
LOS: 1 days | End: 2024-02-07
Payer: COMMERCIAL

## 2024-02-07 VITALS — SYSTOLIC BLOOD PRESSURE: 139 MMHG | DIASTOLIC BLOOD PRESSURE: 68 MMHG | TEMPERATURE: 97 F | HEART RATE: 107 BPM

## 2024-02-07 DIAGNOSIS — Z98.890 OTHER SPECIFIED POSTPROCEDURAL STATES: Chronic | ICD-10-CM

## 2024-02-07 DIAGNOSIS — C54.1 MALIGNANT NEOPLASM OF ENDOMETRIUM: ICD-10-CM

## 2024-02-07 DIAGNOSIS — Z90.49 ACQUIRED ABSENCE OF OTHER SPECIFIED PARTS OF DIGESTIVE TRACT: Chronic | ICD-10-CM

## 2024-02-07 LAB
ALBUMIN SERPL ELPH-MCNC: 4.3 G/DL
ALP BLD-CCNC: 74 U/L
ALT SERPL-CCNC: 9 U/L
ANION GAP SERPL CALC-SCNC: 13 MMOL/L
AST SERPL-CCNC: 11 U/L
BILIRUB SERPL-MCNC: <0.2 MG/DL
BUN SERPL-MCNC: 19 MG/DL
CALCIUM SERPL-MCNC: 9 MG/DL
CANCER AG125 SERPL-ACNC: 5 U/ML
CHLORIDE SERPL-SCNC: 100 MMOL/L
CO2 SERPL-SCNC: 28 MMOL/L
CREAT SERPL-MCNC: 0.9 MG/DL
EGFR: 72 ML/MIN/1.73M2
GLUCOSE SERPL-MCNC: 103 MG/DL
MAGNESIUM SERPL-MCNC: 1.2 MG/DL
POTASSIUM SERPL-SCNC: 4.2 MMOL/L
PROT SERPL-MCNC: 6.7 G/DL
SODIUM SERPL-SCNC: 141 MMOL/L
T4 FREE SERPL-MCNC: 1.3 NG/DL
TSH SERPL-ACNC: 1.22 UIU/ML

## 2024-02-07 PROCEDURE — 96413 CHEMO IV INFUSION 1 HR: CPT

## 2024-02-07 PROCEDURE — 96415 CHEMO IV INFUSION ADDL HR: CPT

## 2024-02-07 PROCEDURE — 96417 CHEMO IV INFUS EACH ADDL SEQ: CPT

## 2024-02-07 PROCEDURE — 96367 TX/PROPH/DG ADDL SEQ IV INF: CPT

## 2024-02-07 PROCEDURE — 96375 TX/PRO/DX INJ NEW DRUG ADDON: CPT

## 2024-02-07 RX ORDER — MAGNESIUM SULFATE 500 MG/ML
2 VIAL (ML) INJECTION ONCE
Refills: 0 | Status: COMPLETED | OUTPATIENT
Start: 2024-02-07 | End: 2024-02-07

## 2024-02-07 RX ORDER — DOSTARLIMAB 50 MG/ML
500 INJECTION INTRAVENOUS ONCE
Refills: 0 | Status: COMPLETED | OUTPATIENT
Start: 2024-02-07 | End: 2024-02-07

## 2024-02-07 RX ORDER — DEXAMETHASONE 0.5 MG/5ML
12 ELIXIR ORAL ONCE
Refills: 0 | Status: COMPLETED | OUTPATIENT
Start: 2024-02-07 | End: 2024-02-07

## 2024-02-07 RX ORDER — FOSAPREPITANT DIMEGLUMINE 150 MG/5ML
150 INJECTION, POWDER, LYOPHILIZED, FOR SOLUTION INTRAVENOUS ONCE
Refills: 0 | Status: COMPLETED | OUTPATIENT
Start: 2024-02-07 | End: 2024-02-07

## 2024-02-07 RX ORDER — DIPHENHYDRAMINE HCL 50 MG
50 CAPSULE ORAL ONCE
Refills: 0 | Status: COMPLETED | OUTPATIENT
Start: 2024-02-07 | End: 2024-02-07

## 2024-02-07 RX ORDER — FAMOTIDINE 10 MG/ML
20 INJECTION INTRAVENOUS ONCE
Refills: 0 | Status: COMPLETED | OUTPATIENT
Start: 2024-02-07 | End: 2024-02-07

## 2024-02-07 RX ORDER — PACLITAXEL 6 MG/ML
365 INJECTION, SOLUTION, CONCENTRATE INTRAVENOUS ONCE
Refills: 0 | Status: COMPLETED | OUTPATIENT
Start: 2024-02-07 | End: 2024-02-07

## 2024-02-07 RX ORDER — CARBOPLATIN 50 MG
533 VIAL (EA) INTRAVENOUS ONCE
Refills: 0 | Status: COMPLETED | OUTPATIENT
Start: 2024-02-07 | End: 2024-02-07

## 2024-02-07 RX ADMIN — PACLITAXEL 365 MILLIGRAM(S): 6 INJECTION, SOLUTION, CONCENTRATE INTRAVENOUS at 15:45

## 2024-02-07 RX ADMIN — FAMOTIDINE 20 MILLIGRAM(S): 10 INJECTION INTRAVENOUS at 11:10

## 2024-02-07 RX ADMIN — DOSTARLIMAB 500 MILLIGRAM(S): 50 INJECTION INTRAVENOUS at 12:35

## 2024-02-07 RX ADMIN — Medication 533 MILLIGRAM(S): at 15:50

## 2024-02-07 RX ADMIN — DOSTARLIMAB 500 MILLIGRAM(S): 50 INJECTION INTRAVENOUS at 12:05

## 2024-02-07 RX ADMIN — Medication 122 MILLIGRAM(S): at 10:40

## 2024-02-07 RX ADMIN — Medication 102 MILLIGRAM(S): at 11:10

## 2024-02-07 RX ADMIN — FOSAPREPITANT DIMEGLUMINE 500 MILLIGRAM(S): 150 INJECTION, POWDER, LYOPHILIZED, FOR SOLUTION INTRAVENOUS at 10:03

## 2024-02-07 RX ADMIN — Medication 12 MILLIGRAM(S): at 10:55

## 2024-02-07 RX ADMIN — FAMOTIDINE 104 MILLIGRAM(S): 10 INJECTION INTRAVENOUS at 10:55

## 2024-02-07 RX ADMIN — Medication 2 GRAM(S): at 11:40

## 2024-02-07 RX ADMIN — Medication 50 MILLIGRAM(S): at 11:25

## 2024-02-07 RX ADMIN — FOSAPREPITANT DIMEGLUMINE 150 MILLIGRAM(S): 150 INJECTION, POWDER, LYOPHILIZED, FOR SOLUTION INTRAVENOUS at 10:33

## 2024-02-07 RX ADMIN — Medication 25 GRAM(S): at 10:40

## 2024-02-07 RX ADMIN — PACLITAXEL 365 MILLIGRAM(S): 6 INJECTION, SOLUTION, CONCENTRATE INTRAVENOUS at 12:45

## 2024-02-09 NOTE — CONSULT LETTER
[Dear  ___] : Dear  [unfilled], [Consult Letter:] : I had the pleasure of evaluating your patient, [unfilled]. [Please see my note below.] : Please see my note below. [Consult Closing:] : Thank you very much for allowing me to participate in the care of this patient.  If you have any questions, please do not hesitate to contact me. [Sincerely,] : Sincerely, [DrKevin  ___] : Dr. ANDREWS [FreeTextEntry3] : Tammy Henry MD  [facility] Attending Physician [facility] [phone]

## 2024-02-09 NOTE — ASSESSMENT
[FreeTextEntry1] : In summary, Ms. Patel is a 63 year old AA female with PMHx of HTN, DM type II, Rt breast mass (fibroadenoma confirmed on biopsy), Hx of fibroids who presents for the initial consultation of her Stage IV Carcinosarcoma of the uterus.  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 There are multiple unfavorable histologic features incl carcinosarcoma histology, metastasis outside uterus and high grade in this case.  Patient tumor was found to be MMR deficient. In GODFREY trial, among the 118 patients with dMMR, MSI-H tumors, 24-month PFS was 61 percent with Dostarlimab versus 16 percent with placebo (HR 0.28, 95% CI, 0.16-0.50). OS at 24 months was 83 percent in the Dostarlimab group and 59 percent in the placebo group (HR 0.30, 95% CI 0.13-0.70). Only 8.9% of the patient population had carcinosarcoma.   She started on chemotherapy consisting of Carboplatin AUC5 and Taxol 175 mg/m2 with Dostarlimab 500 mg IV every three weeks on 11/15/23.  RECOMMENDATIONS: Previous notes reviewed and all relevant laboratory and radiology results discussed with Dr Henry and were communicated to the patient.  Continue cycle # 5 of Carboplatin AUC5, Taxol 175 mg/m2, Dostarlimab 500 mg IV every 3 weeks. CBC today reviewed and is adequate. Moderate anemia noted. *** Side effect profile of Carboplatin were discussed with pt including but not limited to: Decreased serum calcium, decreased serum magnesium, decreased serum potassium, decreased serum sodium, gastrointestinal pain, nausea and vomiting, anemia, leukopenia, neutropenia, thrombocytopenia, increased serum alkaline phosphatase, increased serum aspartate aminotransferase, decreased creatinine clearance, increased blood urea nitrogen, alopecia,  constipation, diarrhea, dysgeusia, stomatitis, hypersensitivity reaction, infection, neurotoxicity, peripheral neuropathy, ototoxicity. *** Side effect profile of Taxol were discussed with pt including but not limited to: Low blood counts, risk for infection, anemia and/or bleeding, hair loss, arthralgias and myalgias, peripheral neuropathy, nausea and vomiting, diarrhea, mouth sores, hypersensitivity reaction. *** Side effect profile of Dostarlimab were discussed with pt including but not limited to: anemia, fatigue, hyperglycemia, hyponatremia, hypoalbuminemia, itching, cough, nausea, rash, decreased appetite, hypertriglyceridemia, increased liver enzymes, hypocalcemia, constipation, diarrhea, upper respiratory tract infection and an immune-mediated reaction. Lab work will check for elevated liver enzymes and thyroid function.  The high risk of complications and complexity associated with antineoplastic therapy administration has been explained to the pt and family. Chemotherapy will be given with adequate precautions including premedications, hydration and close monitoring during treatment.  Chemotherapy will be administered under my direct supervision. -- SUPPORTIVE MEASURES discussed: Zofran 8 mg Q 8 hrs prn for emesis and nausea. Compazine Q 6 hrs prn. Encourage adequate fluid intake. GI prophylaxis with PPI. Probiotics -- Labs: CMP, . -- Port flush per protocol. -- Rslts of CT chest/abdomen/pelvis d/w pt. Multiple pulmonary nodules sub cm size Improvement in abdominal fluid collections 2.3 X 1.4 cm pancreatic lesion which will be monitored  WILL MONITOR THESE FINDINGS- Imaging will be repeated after another 3 cycles.   -- Continue to follow up with PCP, GI for health maintenance and screenings.  #Microcytic anemia -- Ferritin and TIBC on 12/7/23 are benign. -- Stable, will continue to monitor.   - # Fibroadenoma of Right Breast - She was diagnosed with Biopsy 05/2022. - She is due for mammogram; we will send her for Mammo once she finished her chemotherapy  RTC in 3 weeks.

## 2024-02-09 NOTE — REVIEW OF SYSTEMS
[Diarrhea: Grade 0] : Diarrhea: Grade 0 [Negative] : Allergic/Immunologic [Fever] : no fever [Chills] : no chills [Chest Pain] : no chest pain [Palpitations] : no palpitations [Shortness Of Breath] : no shortness of breath [Wheezing] : no wheezing [Dysuria] : no dysuria [Incontinence] : no incontinence [Joint Pain] : no joint pain [Joint Stiffness] : no joint stiffness [FreeTextEntry7] : Abdominal surgical incision healed, no tenderness noted. [FreeTextEntry9] : Cold feet  [de-identified] : Abdominal surgical incision healed, no tenderness noted. Alopecia

## 2024-02-09 NOTE — PHYSICAL EXAM
[Restricted in physically strenuous activity but ambulatory and able to carry out work of a light or sedentary nature] : Status 1- Restricted in physically strenuous activity but ambulatory and able to carry out work of a light or sedentary nature, e.g., light house work, office work [Obese] : obese [Normal] : affect appropriate [de-identified] : Abdominal surgical incision healed, no tenderness noted. [de-identified] : Right chest port-A-cath intact, no erythema or tenderness noted. Alopecia

## 2024-02-09 NOTE — HISTORY OF PRESENT ILLNESS
[Disease: _____________________] : Disease: [unfilled] [T: ___] : T[unfilled] [N: ___] : N[unfilled] [M: ___] : M[unfilled] [AJCC Stage: ____] : AJCC Stage: [unfilled] [de-identified] : Ms Patel is a 62 year old AA female with PMHx of HTN, DM type II, Rt breast mass (fibroadenoma confirmed on biopsy), Hx of fibroids who presents for the initial consultation of her Stage IV Carcinosarcoma of the uterus.  Patient reports that she had menopause at age of 56. Since then she had been having intermittent vaginal bleeding. Initially it was attributed to vaginal dryness as it was associated with coitus. Later she switched her GYN and she was told that she had fibroids that caused her vaginal bleeding. She changed her GYN again, and this time she had TVS that showed thickened endometrium and fibroids. She was taken for D&C in the August 2023, and the biopsy came back as carcinosarcoma of uterus. She was seen by Gyn-Onc (Dr. Patino) who took her for ( Ro converted to Exlap/MRH/BSO/omentectomy/tumor debulking on 10/16/23 for carcinosarcoma of the endometrium).  Today, its been two weeks since her surgery and she has been recovering well. She reports that she is eating well, weight is stable, bowel habits are normal and denies any vaginal bleed. She has a mid-abdominal scar that that is having some foul-smelling discharge.   Family Hx is not significant She lives with her daughter and mother. 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 [de-identified] : Carcinosarcoma [de-identified] : CA-125 74 [de-identified] : 12/5/23 Pt is s/p 1 cycle of chemo, (Taxol/carbo/dorsarlimab) tolerated well. Had some mild SE which were addressed.  12/26/23 Patient is here to follow up for endometrial cancer. S/P cycle #2 carbo/Taxol, tolerating well except for siffness of fingers post cycle #2 and cold feet. She feels well, denies abdominal pain, nausea, vomiting or abnormal vaginal bleeding or discharge, no fever or chills.  1/16/24 Patient is here to follow up for endometrial cancer. S/P cycle #3 carbo/Taxol and Dostarlimab, tolerating well except for cold feet. She feels well, denies abdominal pain, nausea, vomiting or abnormal vaginal bleeding or discharge, no fever or chills. 2/6/24 Pt is here for follow up. Feels well. Tolerating chemo well. No abd pain, Nausea, vomiting or diarrhea

## 2024-02-27 ENCOUNTER — APPOINTMENT (OUTPATIENT)
Dept: HEMATOLOGY ONCOLOGY | Facility: CLINIC | Age: 64
End: 2024-02-27
Payer: COMMERCIAL

## 2024-02-27 ENCOUNTER — OUTPATIENT (OUTPATIENT)
Dept: OUTPATIENT SERVICES | Facility: HOSPITAL | Age: 64
LOS: 1 days | End: 2024-02-27
Payer: COMMERCIAL

## 2024-02-27 VITALS
DIASTOLIC BLOOD PRESSURE: 79 MMHG | HEART RATE: 82 BPM | WEIGHT: 228 LBS | SYSTOLIC BLOOD PRESSURE: 172 MMHG | BODY MASS INDEX: 38.93 KG/M2 | TEMPERATURE: 97.6 F | HEIGHT: 64 IN

## 2024-02-27 DIAGNOSIS — Z98.890 OTHER SPECIFIED POSTPROCEDURAL STATES: Chronic | ICD-10-CM

## 2024-02-27 DIAGNOSIS — Z90.49 ACQUIRED ABSENCE OF OTHER SPECIFIED PARTS OF DIGESTIVE TRACT: Chronic | ICD-10-CM

## 2024-02-27 DIAGNOSIS — C54.1 MALIGNANT NEOPLASM OF ENDOMETRIUM: ICD-10-CM

## 2024-02-27 LAB
ALBUMIN SERPL ELPH-MCNC: 4.2 G/DL
ALP BLD-CCNC: 69 U/L
ALT SERPL-CCNC: 10 U/L
ANION GAP SERPL CALC-SCNC: 14 MMOL/L
AST SERPL-CCNC: 12 U/L
BASOPHILS # BLD AUTO: 0.02 K/UL
BASOPHILS NFR BLD AUTO: 0.3 %
BILIRUB SERPL-MCNC: 0.3 MG/DL
BUN SERPL-MCNC: 18 MG/DL
CALCIUM SERPL-MCNC: 9.1 MG/DL
CHLORIDE SERPL-SCNC: 100 MMOL/L
CO2 SERPL-SCNC: 26 MMOL/L
CREAT SERPL-MCNC: 0.9 MG/DL
EGFR: 72 ML/MIN/1.73M2
EOSINOPHIL # BLD AUTO: 0.07 K/UL
EOSINOPHIL NFR BLD AUTO: 1.2 %
GLUCOSE SERPL-MCNC: 104 MG/DL
HCT VFR BLD CALC: 29.5 %
HGB BLD-MCNC: 9.5 G/DL
IMM GRANULOCYTES NFR BLD AUTO: 0.7 %
LYMPHOCYTES # BLD AUTO: 1.52 K/UL
LYMPHOCYTES NFR BLD AUTO: 25.4 %
MAGNESIUM SERPL-MCNC: 1.3 MG/DL
MAN DIFF?: NORMAL
MCHC RBC-ENTMCNC: 26.5 PG
MCHC RBC-ENTMCNC: 32.2 G/DL
MCV RBC AUTO: 82.2 FL
MONOCYTES # BLD AUTO: 0.72 K/UL
MONOCYTES NFR BLD AUTO: 12 %
NEUTROPHILS # BLD AUTO: 3.61 K/UL
NEUTROPHILS NFR BLD AUTO: 60.4 %
PLATELET # BLD AUTO: 173 K/UL
PMV BLD AUTO: 0 /100 WBCS
POTASSIUM SERPL-SCNC: 3.8 MMOL/L
PROT SERPL-MCNC: 6.6 G/DL
RBC # BLD: 3.59 M/UL
RBC # FLD: 17.9 %
SODIUM SERPL-SCNC: 140 MMOL/L
WBC # FLD AUTO: 5.98 K/UL

## 2024-02-27 PROCEDURE — G2211 COMPLEX E/M VISIT ADD ON: CPT

## 2024-02-27 PROCEDURE — 83735 ASSAY OF MAGNESIUM: CPT

## 2024-02-27 PROCEDURE — 80053 COMPREHEN METABOLIC PANEL: CPT

## 2024-02-27 PROCEDURE — 99215 OFFICE O/P EST HI 40 MIN: CPT

## 2024-02-27 PROCEDURE — 85027 COMPLETE CBC AUTOMATED: CPT

## 2024-02-28 ENCOUNTER — LABORATORY RESULT (OUTPATIENT)
Age: 64
End: 2024-02-28

## 2024-02-28 ENCOUNTER — OUTPATIENT (OUTPATIENT)
Dept: OUTPATIENT SERVICES | Facility: HOSPITAL | Age: 64
LOS: 1 days | End: 2024-02-28
Payer: COMMERCIAL

## 2024-02-28 ENCOUNTER — APPOINTMENT (OUTPATIENT)
Dept: INFUSION THERAPY | Facility: CLINIC | Age: 64
End: 2024-02-28

## 2024-02-28 DIAGNOSIS — Z98.890 OTHER SPECIFIED POSTPROCEDURAL STATES: Chronic | ICD-10-CM

## 2024-02-28 DIAGNOSIS — Z90.49 ACQUIRED ABSENCE OF OTHER SPECIFIED PARTS OF DIGESTIVE TRACT: Chronic | ICD-10-CM

## 2024-02-28 DIAGNOSIS — C54.1 MALIGNANT NEOPLASM OF ENDOMETRIUM: ICD-10-CM

## 2024-02-28 PROCEDURE — 87389 HIV-1 AG W/HIV-1&-2 AB AG IA: CPT

## 2024-02-28 PROCEDURE — 96417 CHEMO IV INFUS EACH ADDL SEQ: CPT

## 2024-02-28 PROCEDURE — 87340 HEPATITIS B SURFACE AG IA: CPT

## 2024-02-28 PROCEDURE — 36415 COLL VENOUS BLD VENIPUNCTURE: CPT

## 2024-02-28 PROCEDURE — 96415 CHEMO IV INFUSION ADDL HR: CPT

## 2024-02-28 PROCEDURE — 86803 HEPATITIS C AB TEST: CPT

## 2024-02-28 PROCEDURE — 86704 HEP B CORE ANTIBODY TOTAL: CPT

## 2024-02-28 PROCEDURE — 96367 TX/PROPH/DG ADDL SEQ IV INF: CPT

## 2024-02-28 PROCEDURE — 87536 HIV-1 QUANT&REVRSE TRNSCRPJ: CPT

## 2024-02-28 PROCEDURE — 86706 HEP B SURFACE ANTIBODY: CPT

## 2024-02-28 PROCEDURE — 96413 CHEMO IV INFUSION 1 HR: CPT

## 2024-02-28 PROCEDURE — 87522 HEPATITIS C REVRS TRNSCRPJ: CPT

## 2024-02-28 RX ORDER — DEXAMETHASONE 0.5 MG/5ML
12 ELIXIR ORAL ONCE
Refills: 0 | Status: COMPLETED | OUTPATIENT
Start: 2024-02-28 | End: 2024-02-28

## 2024-02-28 RX ORDER — PACLITAXEL 6 MG/ML
365 INJECTION, SOLUTION, CONCENTRATE INTRAVENOUS ONCE
Refills: 0 | Status: COMPLETED | OUTPATIENT
Start: 2024-02-28 | End: 2024-02-28

## 2024-02-28 RX ORDER — DOSTARLIMAB 50 MG/ML
500 INJECTION INTRAVENOUS ONCE
Refills: 0 | Status: COMPLETED | OUTPATIENT
Start: 2024-02-28 | End: 2024-02-28

## 2024-02-28 RX ORDER — FOSAPREPITANT DIMEGLUMINE 150 MG/5ML
150 INJECTION, POWDER, LYOPHILIZED, FOR SOLUTION INTRAVENOUS ONCE
Refills: 0 | Status: COMPLETED | OUTPATIENT
Start: 2024-02-28 | End: 2024-02-28

## 2024-02-28 RX ORDER — FAMOTIDINE 10 MG/ML
20 INJECTION INTRAVENOUS ONCE
Refills: 0 | Status: COMPLETED | OUTPATIENT
Start: 2024-02-28 | End: 2024-02-28

## 2024-02-28 RX ORDER — CARBOPLATIN 50 MG
533 VIAL (EA) INTRAVENOUS ONCE
Refills: 0 | Status: COMPLETED | OUTPATIENT
Start: 2024-02-28 | End: 2024-02-28

## 2024-02-28 RX ORDER — DIPHENHYDRAMINE HCL 50 MG
50 CAPSULE ORAL ONCE
Refills: 0 | Status: COMPLETED | OUTPATIENT
Start: 2024-02-28 | End: 2024-02-28

## 2024-02-28 RX ORDER — MAGNESIUM SULFATE 500 MG/ML
2 VIAL (ML) INJECTION ONCE
Refills: 0 | Status: COMPLETED | OUTPATIENT
Start: 2024-02-28 | End: 2024-02-28

## 2024-02-28 RX ADMIN — Medication 122 MILLIGRAM(S): at 09:08

## 2024-02-28 RX ADMIN — DOSTARLIMAB 500 MILLIGRAM(S): 50 INJECTION INTRAVENOUS at 10:24

## 2024-02-28 RX ADMIN — Medication 25 GRAM(S): at 09:31

## 2024-02-28 RX ADMIN — Medication 533 MILLIGRAM(S): at 10:24

## 2024-02-28 RX ADMIN — FOSAPREPITANT DIMEGLUMINE 500 MILLIGRAM(S): 150 INJECTION, POWDER, LYOPHILIZED, FOR SOLUTION INTRAVENOUS at 09:08

## 2024-02-28 RX ADMIN — PACLITAXEL 365 MILLIGRAM(S): 6 INJECTION, SOLUTION, CONCENTRATE INTRAVENOUS at 10:23

## 2024-02-28 RX ADMIN — FAMOTIDINE 104 MILLIGRAM(S): 10 INJECTION INTRAVENOUS at 09:08

## 2024-02-28 RX ADMIN — Medication 102 MILLIGRAM(S): at 09:08

## 2024-03-01 NOTE — CONSULT LETTER
[Dear  ___] : Dear  [unfilled], [Consult Letter:] : I had the pleasure of evaluating your patient, [unfilled]. [Please see my note below.] : Please see my note below. [Consult Closing:] : Thank you very much for allowing me to participate in the care of this patient.  If you have any questions, please do not hesitate to contact me. [Sincerely,] : Sincerely, [DrKevin  ___] : Dr. ANDREWS [FreeTextEntry3] : Tammy Henry MD  [facility] Attending Physician [facility] [phone]

## 2024-03-01 NOTE — REVIEW OF SYSTEMS
[Diarrhea: Grade 0] : Diarrhea: Grade 0 [Negative] : Allergic/Immunologic [Fever] : no fever [Chills] : no chills [Chest Pain] : no chest pain [Palpitations] : no palpitations [Shortness Of Breath] : no shortness of breath [Wheezing] : no wheezing [Incontinence] : no incontinence [Dysuria] : no dysuria [Joint Stiffness] : no joint stiffness [Joint Pain] : no joint pain [FreeTextEntry7] : Abdominal surgical incision healed, no tenderness noted. [de-identified] : Abdominal surgical incision healed, no tenderness noted. Alopecia [FreeTextEntry9] : Cold feet

## 2024-03-01 NOTE — ASSESSMENT
[FreeTextEntry1] : In summary, Ms. Patel is a 63 year old AA female with PMHx of HTN, DM type II, Rt breast mass (fibroadenoma confirmed on biopsy), Hx of fibroids with diagnosis of Stage IV Carcinosarcoma of the uterus.  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 There are multiple unfavorable histologic features incl carcinosarcoma histology, metastasis outside uterus and high grade in this case.  Patient tumor was found to be MMR deficient. In GODFREY trial, among the 118 patients with dMMR, MSI-H tumors, 24-month PFS was 61 percent with Dostarlimab versus 16 percent with placebo (HR 0.28, 95% CI, 0.16-0.50). OS at 24 months was 83 percent in the Dostarlimab group and 59 percent in the placebo group (HR 0.30, 95% CI 0.13-0.70). Only 8.9% of the patient population had carcinosarcoma.   She started on chemotherapy consisting of Carboplatin AUC5 and Taxol 175 mg/m2 with Dostarlimab 500 mg IV every three weeks on 11/15/23.  RECOMMENDATIONS: Previous notes reviewed and all relevant laboratory and radiology results discussed with Dr Henry and were communicated to the patient.  Continue cycle # 6 of Carboplatin AUC5, Taxol 175 mg/m2, Dostarlimab 500 mg IV every 3 weeks. CBC today reviewed and is adequate. Moderate anemia noted.  *** Side effect profile of Carboplatin were discussed with pt including but not limited to: Decreased serum calcium, decreased serum magnesium, decreased serum potassium, decreased serum sodium, gastrointestinal pain, nausea and vomiting, anemia, leukopenia, neutropenia, thrombocytopenia, increased serum alkaline phosphatase, increased serum aspartate aminotransferase, decreased creatinine clearance, increased blood urea nitrogen, alopecia,  constipation, diarrhea, dysgeusia, stomatitis, hypersensitivity reaction, infection, neurotoxicity, peripheral neuropathy, ototoxicity. *** Side effect profile of Taxol were discussed with pt including but not limited to: Low blood counts, risk for infection, anemia and/or bleeding, hair loss, arthralgias and myalgias, peripheral neuropathy, nausea and vomiting, diarrhea, mouth sores, hypersensitivity reaction. *** Side effect profile of Dostarlimab were discussed with pt including but not limited to: anemia, fatigue, hyperglycemia, hyponatremia, hypoalbuminemia, itching, cough, nausea, rash, decreased appetite, hypertriglyceridemia, increased liver enzymes, hypocalcemia, constipation, diarrhea, upper respiratory tract infection and an immune-mediated reaction. Lab work will check for elevated liver enzymes and thyroid function.  The high risk of complications and complexity associated with antineoplastic therapy administration has been explained to the pt and family. Chemotherapy will be given with adequate precautions including premedications, hydration and close monitoring during treatment.  Chemotherapy will be administered under my direct supervision. -- SUPPORTIVE MEASURES discussed: Zofran 8 mg Q 8 hrs prn for emesis and nausea. Compazine Q 6 hrs prn. Encourage adequate fluid intake. GI prophylaxis with PPI. Probiotics -- Labs: CMP, . -- Port flush per protocol. -- Rslts of CT chest/abdomen/pelvis d/w pt. Multiple pulmonary nodules sub cm size Improvement in abdominal fluid collections 2.3 X 1.4 cm pancreatic lesion which will be monitored  WILL MONITOR THESE FINDINGS- Imaging will be repeated after another 3 cycles.   # anemia likely d/t chemo -- Stable, will continue to monitor.   - # Fibroadenoma of Right Breast - She was diagnosed with Biopsy 05/2022. - She is due for mammogram; we will send her for Mammo once she finished her chemotherapy  RTC in 3 weeks.

## 2024-03-01 NOTE — HISTORY OF PRESENT ILLNESS
[Disease: _____________________] : Disease: [unfilled] [T: ___] : T[unfilled] [N: ___] : N[unfilled] [M: ___] : M[unfilled] [AJCC Stage: ____] : AJCC Stage: [unfilled] [de-identified] : Carcinosarcoma [de-identified] : Ms Patel is a 62 year old AA female with PMHx of HTN, DM type II, Rt breast mass (fibroadenoma confirmed on biopsy), Hx of fibroids who presents for the initial consultation of her Stage IV Carcinosarcoma of the uterus.  Patient reports that she had menopause at age of 56. Since then she had been having intermittent vaginal bleeding. Initially it was attributed to vaginal dryness as it was associated with coitus. Later she switched her GYN and she was told that she had fibroids that caused her vaginal bleeding. She changed her GYN again, and this time she had TVS that showed thickened endometrium and fibroids. She was taken for D&C in the August 2023, and the biopsy came back as carcinosarcoma of uterus. She was seen by Gyn-Onc (Dr. Patino) who took her for ( Ro converted to Exlap/MRH/BSO/omentectomy/tumor debulking on 10/16/23 for carcinosarcoma of the endometrium).  Today, its been two weeks since her surgery and she has been recovering well. She reports that she is eating well, weight is stable, bowel habits are normal and denies any vaginal bleed. She has a mid-abdominal scar that that is having some foul-smelling discharge.   Family Hx is not significant She lives with her daughter and mother. 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 [de-identified] : CA-125 74 [de-identified] : 12/5/23 Pt is s/p 1 cycle of chemo, (Taxol/carbo/dorsarlimab) tolerated well. Had some mild SE which were addressed.  12/26/23 Patient is here to follow up for endometrial cancer. S/P cycle #2 carbo/Taxol, tolerating well except for siffness of fingers post cycle #2 and cold feet. She feels well, denies abdominal pain, nausea, vomiting or abnormal vaginal bleeding or discharge, no fever or chills.  1/16/24 Patient is here to follow up for endometrial cancer. S/P cycle #3 carbo/Taxol and Dostarlimab, tolerating well except for cold feet. She feels well, denies abdominal pain, nausea, vomiting or abnormal vaginal bleeding or discharge, no fever or chills. 2/6/24 Pt is here for follow up. Feels well. Tolerating chemo well. No abd pain, Nausea, vomiting or diarrhea  2/27/24: Pt is here for follow up She endorses peripheral neuropathy but other wise is tolerating chemo well No abd pain, Nausea, vomiting or diarrhea She is scheduled for cycle #6

## 2024-03-01 NOTE — PHYSICAL EXAM
[Restricted in physically strenuous activity but ambulatory and able to carry out work of a light or sedentary nature] : Status 1- Restricted in physically strenuous activity but ambulatory and able to carry out work of a light or sedentary nature, e.g., light house work, office work [Obese] : obese [Normal] : affect appropriate [de-identified] : Abdominal surgical incision healed, no tenderness noted. [de-identified] : Right chest port-A-cath intact, no erythema or tenderness noted. Alopecia

## 2024-03-08 ENCOUNTER — APPOINTMENT (OUTPATIENT)
Dept: GYNECOLOGIC ONCOLOGY | Facility: CLINIC | Age: 64
End: 2024-03-08
Payer: COMMERCIAL

## 2024-03-08 VITALS
HEIGHT: 64 IN | DIASTOLIC BLOOD PRESSURE: 82 MMHG | HEART RATE: 80 BPM | BODY MASS INDEX: 38.41 KG/M2 | WEIGHT: 225 LBS | SYSTOLIC BLOOD PRESSURE: 147 MMHG

## 2024-03-08 PROCEDURE — 99214 OFFICE O/P EST MOD 30 MIN: CPT

## 2024-03-08 NOTE — REVIEW OF SYSTEMS
[Negative] : Musculoskeletal [Rash] : no rash noted [Ulcer] : no ulcer was seen [Syncope] : no syncope noted [Neuropathy] : no neuropathy [Depression] : no depression [Diabetes] : diabetes mellitus [Dysuria] : no dysuria [Abn Vag Bleeding] : abnormal vaginal bleeding [Dyspareunia] : no dyspareunia [Incontinence] : no incontinence [Fatigue] : no fatigue [Recent Wt Gain ___ Lbs] : no recent weight gain [Recent Wt Loss___ Lbs] : no recent weight loss [Chest Pain] : no chest pain [BENITEZ] : no dyspnea on exertion [Wheezing] : no wheezing [SOB on Exertion] : no shortness of breath during exertion [Bloody Stools] : no bloody stools [Nausea] : no nausea/vomitting [Muscle Weakness] : no muscle weakness

## 2024-03-08 NOTE — DISCUSSION/SUMMARY
[Reviewed Clinical Lab Test(s)] : Results of clinical tests were reviewed. [Reviewed Radiology Report(s)] : Radiology reports were reviewed. [Visit Time ___ Minutes] : [unfilled] minutes [Face to Face Time___ Minutes] : with [unfilled] minutes in face to face consultation. [FreeTextEntry1] : Talked to patient regarding her Dx and the GODFREY trial results. She has a dMMR tumor which means her response to immunotherapy is substantial. In general following 6 cycles of chemotherapy, she would go on monthly maintenance Dostarlimab. Alopecia and mild plantar neuropathy present, otherwise no other side effects. At fairly high risk for recurrence given her histology and stage.  All questions answered, concerns addressed.  Return in 3 months.

## 2024-03-08 NOTE — HISTORY OF PRESENT ILLNESS
[FreeTextEntry1] : 61 y/o with stage IVB carcinosarcoma of the uterus  s/p Ro converted to Exlap/MRH/BSO/omentectomy/tumor debulking on 10/16/23 .  now s/p 6 cycle of chemo, (Taxol/carbo/dorsarlimab) tolerated well  No other complaints.

## 2024-03-08 NOTE — PHYSICAL EXAM
[Normal] : Vagina: Normal [Absent] : Adnexa(ae): Absent [FreeTextEntry1] : NAD [de-identified] : HECTOR [de-identified] : no edema [de-identified] : soft NT/ND  [de-identified] : well healed [de-identified] : no masses [Fully active, able to carry on all pre-disease performance without restriction] : Status 0 - Fully active, able to carry on all pre-disease performance without restriction

## 2024-03-19 ENCOUNTER — APPOINTMENT (OUTPATIENT)
Dept: HEMATOLOGY ONCOLOGY | Facility: CLINIC | Age: 64
End: 2024-03-19
Payer: COMMERCIAL

## 2024-03-19 ENCOUNTER — OUTPATIENT (OUTPATIENT)
Dept: OUTPATIENT SERVICES | Facility: HOSPITAL | Age: 64
LOS: 1 days | End: 2024-03-19
Payer: COMMERCIAL

## 2024-03-19 DIAGNOSIS — C54.1 MALIGNANT NEOPLASM OF ENDOMETRIUM: ICD-10-CM

## 2024-03-19 LAB
BASOPHILS # BLD AUTO: 0.02 K/UL
BASOPHILS NFR BLD AUTO: 0.4 %
EOSINOPHIL # BLD AUTO: 0.1 K/UL
EOSINOPHIL NFR BLD AUTO: 1.9 %
HCT VFR BLD CALC: 30.6 %
HGB BLD-MCNC: 9.6 G/DL
IMM GRANULOCYTES NFR BLD AUTO: 0.4 %
LYMPHOCYTES # BLD AUTO: 1.42 K/UL
LYMPHOCYTES NFR BLD AUTO: 26.5 %
MAN DIFF?: NORMAL
MCHC RBC-ENTMCNC: 27.1 PG
MCHC RBC-ENTMCNC: 31.4 G/DL
MCV RBC AUTO: 86.4 FL
MONOCYTES # BLD AUTO: 0.62 K/UL
MONOCYTES NFR BLD AUTO: 11.6 %
NEUTROPHILS # BLD AUTO: 3.17 K/UL
NEUTROPHILS NFR BLD AUTO: 59.2 %
PLATELET # BLD AUTO: 212 K/UL
PMV BLD AUTO: 0 /100 WBCS
RBC # BLD: 3.54 M/UL
RBC # FLD: 16.2 %
WBC # FLD AUTO: 5.35 K/UL

## 2024-03-19 PROCEDURE — 85027 COMPLETE CBC AUTOMATED: CPT

## 2024-03-19 PROCEDURE — 99215 OFFICE O/P EST HI 40 MIN: CPT

## 2024-03-19 PROCEDURE — 80053 COMPREHEN METABOLIC PANEL: CPT

## 2024-03-19 PROCEDURE — G2211 COMPLEX E/M VISIT ADD ON: CPT

## 2024-03-19 PROCEDURE — 86304 IMMUNOASSAY TUMOR CA 125: CPT

## 2024-03-20 ENCOUNTER — APPOINTMENT (OUTPATIENT)
Dept: INFUSION THERAPY | Facility: CLINIC | Age: 64
End: 2024-03-20

## 2024-03-20 ENCOUNTER — OUTPATIENT (OUTPATIENT)
Dept: OUTPATIENT SERVICES | Facility: HOSPITAL | Age: 64
LOS: 1 days | End: 2024-03-20
Payer: COMMERCIAL

## 2024-03-20 VITALS — DIASTOLIC BLOOD PRESSURE: 71 MMHG | TEMPERATURE: 98 F | SYSTOLIC BLOOD PRESSURE: 146 MMHG | HEART RATE: 101 BPM

## 2024-03-20 DIAGNOSIS — Z90.49 ACQUIRED ABSENCE OF OTHER SPECIFIED PARTS OF DIGESTIVE TRACT: Chronic | ICD-10-CM

## 2024-03-20 DIAGNOSIS — Z98.890 OTHER SPECIFIED POSTPROCEDURAL STATES: Chronic | ICD-10-CM

## 2024-03-20 DIAGNOSIS — C54.1 MALIGNANT NEOPLASM OF ENDOMETRIUM: ICD-10-CM

## 2024-03-20 LAB
ALBUMIN SERPL ELPH-MCNC: 4.3 G/DL
ALP BLD-CCNC: 72 U/L
ALT SERPL-CCNC: 11 U/L
ANION GAP SERPL CALC-SCNC: 15 MMOL/L
AST SERPL-CCNC: 13 U/L
BILIRUB SERPL-MCNC: 0.2 MG/DL
BUN SERPL-MCNC: 14 MG/DL
CALCIUM SERPL-MCNC: 9.6 MG/DL
CANCER AG125 SERPL-ACNC: 5 U/ML
CHLORIDE SERPL-SCNC: 102 MMOL/L
CO2 SERPL-SCNC: 27 MMOL/L
CREAT SERPL-MCNC: 0.9 MG/DL
EGFR: 72 ML/MIN/1.73M2
GLUCOSE SERPL-MCNC: 85 MG/DL
MAGNESIUM SERPL-MCNC: 1.2 MG/DL
POTASSIUM SERPL-SCNC: 4.3 MMOL/L
PROT SERPL-MCNC: 6.7 G/DL
SODIUM SERPL-SCNC: 144 MMOL/L

## 2024-03-20 PROCEDURE — 83735 ASSAY OF MAGNESIUM: CPT

## 2024-03-20 PROCEDURE — 96413 CHEMO IV INFUSION 1 HR: CPT

## 2024-03-20 PROCEDURE — 36415 COLL VENOUS BLD VENIPUNCTURE: CPT

## 2024-03-20 PROCEDURE — 96367 TX/PROPH/DG ADDL SEQ IV INF: CPT

## 2024-03-20 RX ORDER — MAGNESIUM SULFATE 500 MG/ML
2 VIAL (ML) INJECTION ONCE
Refills: 0 | Status: COMPLETED | OUTPATIENT
Start: 2024-03-20 | End: 2024-03-20

## 2024-03-20 RX ORDER — DOSTARLIMAB 50 MG/ML
500 INJECTION INTRAVENOUS ONCE
Refills: 0 | Status: COMPLETED | OUTPATIENT
Start: 2024-03-20 | End: 2024-03-20

## 2024-03-20 RX ADMIN — DOSTARLIMAB 500 MILLIGRAM(S): 50 INJECTION INTRAVENOUS at 11:02

## 2024-03-20 RX ADMIN — Medication 25 GRAM(S): at 11:54

## 2024-03-20 RX ADMIN — Medication 2 GRAM(S): at 12:54

## 2024-03-20 RX ADMIN — DOSTARLIMAB 500 MILLIGRAM(S): 50 INJECTION INTRAVENOUS at 11:35

## 2024-03-20 NOTE — PHYSICAL EXAM
[Restricted in physically strenuous activity but ambulatory and able to carry out work of a light or sedentary nature] : Status 1- Restricted in physically strenuous activity but ambulatory and able to carry out work of a light or sedentary nature, e.g., light house work, office work [Obese] : obese [Normal] : affect appropriate [de-identified] : Abdominal surgical incision healed, no tenderness noted. [de-identified] : Right chest port-A-cath intact, no erythema or tenderness noted. Alopecia

## 2024-03-20 NOTE — REVIEW OF SYSTEMS
[Diarrhea: Grade 0] : Diarrhea: Grade 0 [Negative] : Allergic/Immunologic [Chills] : no chills [Fever] : no fever [Chest Pain] : no chest pain [Palpitations] : no palpitations [Shortness Of Breath] : no shortness of breath [Wheezing] : no wheezing [Dysuria] : no dysuria [Joint Pain] : no joint pain [Incontinence] : no incontinence [Joint Stiffness] : no joint stiffness [FreeTextEntry7] : Abdominal surgical incision healed, no tenderness noted. [FreeTextEntry9] : Cold feet  [de-identified] : Abdominal surgical incision healed, no tenderness noted. Alopecia

## 2024-03-20 NOTE — CONSULT LETTER
[Consult Letter:] : I had the pleasure of evaluating your patient, [unfilled]. [Dear  ___] : Dear  [unfilled], [Please see my note below.] : Please see my note below. [Consult Closing:] : Thank you very much for allowing me to participate in the care of this patient.  If you have any questions, please do not hesitate to contact me. [Sincerely,] : Sincerely, [DrKevin  ___] : Dr. ANDREWS [FreeTextEntry3] : Tammy Henry MD  [facility] Attending Physician [facility] [phone]

## 2024-03-20 NOTE — HISTORY OF PRESENT ILLNESS
[Disease: _____________________] : Disease: [unfilled] [T: ___] : T[unfilled] [N: ___] : N[unfilled] [AJCC Stage: ____] : AJCC Stage: [unfilled] [M: ___] : M[unfilled] [de-identified] : Ms Patel is a 62 year old AA female with PMHx of HTN, DM type II, Rt breast mass (fibroadenoma confirmed on biopsy), Hx of fibroids who presents for the initial consultation of her Stage IV Carcinosarcoma of the uterus.  Patient reports that she had menopause at age of 56. Since then she had been having intermittent vaginal bleeding. Initially it was attributed to vaginal dryness as it was associated with coitus. Later she switched her GYN and she was told that she had fibroids that caused her vaginal bleeding. She changed her GYN again, and this time she had TVS that showed thickened endometrium and fibroids. She was taken for D&C in the August 2023, and the biopsy came back as carcinosarcoma of uterus. She was seen by Gyn-Onc (Dr. Patino) who took her for ( Ro converted to Exlap/MRH/BSO/omentectomy/tumor debulking on 10/16/23 for carcinosarcoma of the endometrium).  Today, its been two weeks since her surgery and she has been recovering well. She reports that she is eating well, weight is stable, bowel habits are normal and denies any vaginal bleed. She has a mid-abdominal scar that that is having some foul-smelling discharge.   Family Hx is not significant She lives with her daughter and mother. 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 [de-identified] : Carcinosarcoma [de-identified] : CA-125 74 [de-identified] : 12/5/23 Pt is s/p 1 cycle of chemo, (Taxol/carbo/dorsarlimab) tolerated well. Had some mild SE which were addressed.  12/26/23 Patient is here to follow up for endometrial cancer. S/P cycle #2 carbo/Taxol, tolerating well except for siffness of fingers post cycle #2 and cold feet. She feels well, denies abdominal pain, nausea, vomiting or abnormal vaginal bleeding or discharge, no fever or chills.  1/16/24 Patient is here to follow up for endometrial cancer. S/P cycle #3 carbo/Taxol and Dostarlimab, tolerating well except for cold feet. She feels well, denies abdominal pain, nausea, vomiting or abnormal vaginal bleeding or discharge, no fever or chills. 2/6/24 Pt is here for follow up. Feels well. Tolerating chemo well. No abd pain, Nausea, vomiting or diarrhea  2/27/24: Pt is here for follow up She endorses peripheral neuropathy but other wise is tolerating chemo well No abd pain, Nausea, vomiting or diarrhea She is scheduled for cycle #6  3/19/24 Pt is here for follow up. Feels well. has mild diarrhea off and on. No abd pain, N, V No vag bleeding

## 2024-03-20 NOTE — ASSESSMENT
[FreeTextEntry1] : In summary, Ms. Patel is a 63 year old AA female with PMHx of HTN, DM type II, Rt breast mass (fibroadenoma confirmed on biopsy), Hx of fibroids with diagnosis of Stage IV Carcinosarcoma of the uterus.  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 There are multiple unfavorable histologic features incl carcinosarcoma histology, metastasis outside uterus and high grade in this case.  Patient tumor was found to be MMR deficient. In GODFREY trial, among the 118 patients with dMMR, MSI-H tumors, 24-month PFS was 61 percent with Dostarlimab versus 16 percent with placebo (HR 0.28, 95% CI, 0.16-0.50). OS at 24 months was 83 percent in the Dostarlimab group and 59 percent in the placebo group (HR 0.30, 95% CI 0.13-0.70). Only 8.9% of the patient population had carcinosarcoma.   She started on chemotherapy consisting of Carboplatin AUC5 and Taxol 175 mg/m2 with Dostarlimab 500 mg IV every three weeks on 11/15/23 x 6.  RECOMMENDATIONS: Previous notes reviewed and all relevant laboratory and radiology results discussed with and were communicated to the patient.  Continue cycle # 7 of  Dostarlimab 500 mg IV every 3 weeks. CBC today reviewed and is adequate. Moderate anemia noted. Dc carboplatin and Taxol.  *** Side effect profile of Dostarlimab were discussed with pt including but not limited to: anemia, fatigue, hyperglycemia, hyponatremia, hypoalbuminemia, itching, cough, nausea, rash, decreased appetite, hypertriglyceridemia, increased liver enzymes, hypocalcemia, constipation, diarrhea, upper respiratory tract infection and an immune-mediated reaction. Lab work will check for elevated liver enzymes and thyroid function.  The high risk of complications and complexity associated with antineoplastic therapy administration has been explained to the pt and family. Chemotherapy will be given with adequate precautions including premedications, hydration and close monitoring during treatment.  Chemotherapy will be administered under my direct supervision. -- SUPPORTIVE MEASURES discussed: Zofran 8 mg Q 8 hrs prn for emesis and nausea. Compazine Q 6 hrs prn. Encourage adequate fluid intake. GI prophylaxis with PPI. Probiotics -- Labs: CMP, . -- Port flush per protocol. -- Rslts of CT chest/abdomen/pelvis d/w pt. Multiple pulmonary nodules sub cm size Improvement in abdominal fluid collections 2.3 X 1.4 cm pancreatic lesion which will be monitored  WILL MONITOR THESE FINDINGS- Imaging will be repeated after another 2 cycles.   # anemia likely d/t chemo -- Stable, will continue to monitor.   - # Fibroadenoma of Right Breast - She was diagnosed with Biopsy 05/2022. - She is due for mammogram; we will send her for Mammo once she finished her chemotherapy  RTC in 3 weeks.

## 2024-03-28 ENCOUNTER — OUTPATIENT (OUTPATIENT)
Dept: OUTPATIENT SERVICES | Facility: HOSPITAL | Age: 64
LOS: 1 days | End: 2024-03-28
Payer: COMMERCIAL

## 2024-03-28 ENCOUNTER — APPOINTMENT (OUTPATIENT)
Age: 64
End: 2024-03-28

## 2024-03-28 VITALS — SYSTOLIC BLOOD PRESSURE: 142 MMHG | HEART RATE: 96 BPM | DIASTOLIC BLOOD PRESSURE: 65 MMHG | TEMPERATURE: 98 F

## 2024-03-28 DIAGNOSIS — Z98.890 OTHER SPECIFIED POSTPROCEDURAL STATES: Chronic | ICD-10-CM

## 2024-03-28 DIAGNOSIS — C54.1 MALIGNANT NEOPLASM OF ENDOMETRIUM: ICD-10-CM

## 2024-03-28 DIAGNOSIS — Z90.49 ACQUIRED ABSENCE OF OTHER SPECIFIED PARTS OF DIGESTIVE TRACT: Chronic | ICD-10-CM

## 2024-03-28 LAB — MAGNESIUM SERPL-MCNC: 1.5 MG/DL

## 2024-03-28 PROCEDURE — 96365 THER/PROPH/DIAG IV INF INIT: CPT

## 2024-03-28 PROCEDURE — 83735 ASSAY OF MAGNESIUM: CPT

## 2024-03-28 PROCEDURE — 36415 COLL VENOUS BLD VENIPUNCTURE: CPT

## 2024-03-28 RX ORDER — MAGNESIUM SULFATE 500 MG/ML
2 VIAL (ML) INJECTION ONCE
Refills: 0 | Status: COMPLETED | OUTPATIENT
Start: 2024-03-28 | End: 2024-03-28

## 2024-03-28 RX ORDER — DOSTARLIMAB 50 MG/ML
500 INJECTION INTRAVENOUS ONCE
Refills: 0 | Status: DISCONTINUED | OUTPATIENT
Start: 2024-03-28 | End: 2024-03-28

## 2024-03-28 RX ADMIN — Medication 2 GRAM(S): at 11:28

## 2024-03-28 RX ADMIN — Medication 25 GRAM(S): at 10:27

## 2024-03-30 DIAGNOSIS — C54.1 MALIGNANT NEOPLASM OF ENDOMETRIUM: ICD-10-CM

## 2024-04-09 ENCOUNTER — APPOINTMENT (OUTPATIENT)
Age: 64
End: 2024-04-09
Payer: COMMERCIAL

## 2024-04-09 ENCOUNTER — OUTPATIENT (OUTPATIENT)
Dept: OUTPATIENT SERVICES | Facility: HOSPITAL | Age: 64
LOS: 1 days | End: 2024-04-09
Payer: COMMERCIAL

## 2024-04-09 ENCOUNTER — LABORATORY RESULT (OUTPATIENT)
Age: 64
End: 2024-04-09

## 2024-04-09 VITALS
SYSTOLIC BLOOD PRESSURE: 136 MMHG | TEMPERATURE: 98.3 F | HEIGHT: 64 IN | WEIGHT: 230 LBS | DIASTOLIC BLOOD PRESSURE: 76 MMHG | BODY MASS INDEX: 39.27 KG/M2 | HEART RATE: 114 BPM

## 2024-04-09 DIAGNOSIS — Z82.49 FAMILY HISTORY OF ISCHEMIC HEART DISEASE AND OTHER DISEASES OF THE CIRCULATORY SYSTEM: ICD-10-CM

## 2024-04-09 DIAGNOSIS — Z83.3 FAMILY HISTORY OF DIABETES MELLITUS: ICD-10-CM

## 2024-04-09 DIAGNOSIS — Z98.890 OTHER SPECIFIED POSTPROCEDURAL STATES: Chronic | ICD-10-CM

## 2024-04-09 DIAGNOSIS — Z90.49 ACQUIRED ABSENCE OF OTHER SPECIFIED PARTS OF DIGESTIVE TRACT: Chronic | ICD-10-CM

## 2024-04-09 DIAGNOSIS — Z87.891 PERSONAL HISTORY OF NICOTINE DEPENDENCE: ICD-10-CM

## 2024-04-09 DIAGNOSIS — Z78.9 OTHER SPECIFIED HEALTH STATUS: ICD-10-CM

## 2024-04-09 DIAGNOSIS — C54.1 MALIGNANT NEOPLASM OF ENDOMETRIUM: ICD-10-CM

## 2024-04-09 LAB
HCT VFR BLD CALC: 31.1 %
HGB BLD-MCNC: 9.9 G/DL
MCHC RBC-ENTMCNC: 26.7 PG
MCHC RBC-ENTMCNC: 31.8 G/DL
MCV RBC AUTO: 83.8 FL
PLATELET # BLD AUTO: 265 K/UL
PMV BLD: 10.2 FL
RBC # BLD: 3.71 M/UL
RBC # FLD: 14.9 %
WBC # FLD AUTO: 5.98 K/UL

## 2024-04-09 PROCEDURE — 80053 COMPREHEN METABOLIC PANEL: CPT

## 2024-04-09 PROCEDURE — 99214 OFFICE O/P EST MOD 30 MIN: CPT

## 2024-04-09 PROCEDURE — 85027 COMPLETE CBC AUTOMATED: CPT

## 2024-04-09 PROCEDURE — 83735 ASSAY OF MAGNESIUM: CPT

## 2024-04-10 ENCOUNTER — APPOINTMENT (OUTPATIENT)
Age: 64
End: 2024-04-10

## 2024-04-10 ENCOUNTER — OUTPATIENT (OUTPATIENT)
Dept: OUTPATIENT SERVICES | Facility: HOSPITAL | Age: 64
LOS: 1 days | End: 2024-04-10
Payer: COMMERCIAL

## 2024-04-10 VITALS — DIASTOLIC BLOOD PRESSURE: 65 MMHG | TEMPERATURE: 98 F | HEART RATE: 90 BPM | SYSTOLIC BLOOD PRESSURE: 142 MMHG

## 2024-04-10 DIAGNOSIS — C54.1 MALIGNANT NEOPLASM OF ENDOMETRIUM: ICD-10-CM

## 2024-04-10 DIAGNOSIS — Z98.890 OTHER SPECIFIED POSTPROCEDURAL STATES: Chronic | ICD-10-CM

## 2024-04-10 DIAGNOSIS — Z90.49 ACQUIRED ABSENCE OF OTHER SPECIFIED PARTS OF DIGESTIVE TRACT: Chronic | ICD-10-CM

## 2024-04-10 PROBLEM — Z87.891 FORMER SMOKER: Status: ACTIVE | Noted: 2018-01-16

## 2024-04-10 PROBLEM — Z82.49 FAMILY HISTORY OF HYPERTENSION: Status: ACTIVE | Noted: 2018-01-16

## 2024-04-10 PROBLEM — Z83.3 FAMILY HISTORY OF DIABETES MELLITUS: Status: ACTIVE | Noted: 2018-01-16

## 2024-04-10 PROBLEM — Z78.9 SOCIAL ALCOHOL USE: Status: ACTIVE | Noted: 2018-01-16

## 2024-04-10 PROBLEM — Z82.49 FAMILY HISTORY OF CARDIAC DISORDER: Status: ACTIVE | Noted: 2018-01-16

## 2024-04-10 LAB
ALBUMIN SERPL ELPH-MCNC: 4.2 G/DL
ALP BLD-CCNC: 67 U/L
ALT SERPL-CCNC: 9 U/L
ANION GAP SERPL CALC-SCNC: 12 MMOL/L
AST SERPL-CCNC: 11 U/L
BILIRUB SERPL-MCNC: 0.2 MG/DL
BUN SERPL-MCNC: 23 MG/DL
CALCIUM SERPL-MCNC: 9.7 MG/DL
CHLORIDE SERPL-SCNC: 102 MMOL/L
CO2 SERPL-SCNC: 27 MMOL/L
CREAT SERPL-MCNC: 1 MG/DL
EGFR: 64 ML/MIN/1.73M2
GLUCOSE SERPL-MCNC: 134 MG/DL
MAGNESIUM SERPL-MCNC: 1.5 MG/DL
POTASSIUM SERPL-SCNC: 4.2 MMOL/L
PROT SERPL-MCNC: 6.8 G/DL
SODIUM SERPL-SCNC: 141 MMOL/L

## 2024-04-10 PROCEDURE — 96413 CHEMO IV INFUSION 1 HR: CPT

## 2024-04-10 PROCEDURE — 96367 TX/PROPH/DG ADDL SEQ IV INF: CPT

## 2024-04-10 RX ORDER — DOSTARLIMAB 50 MG/ML
500 INJECTION INTRAVENOUS ONCE
Refills: 0 | Status: COMPLETED | OUTPATIENT
Start: 2024-04-10 | End: 2024-04-10

## 2024-04-10 RX ORDER — MAGNESIUM SULFATE 100 %
2 POWDER (GRAM) MISCELLANEOUS ONCE
Refills: 0 | Status: COMPLETED | OUTPATIENT
Start: 2024-04-10 | End: 2024-04-10

## 2024-04-10 RX ADMIN — Medication 25 GRAM(S): at 13:11

## 2024-04-10 RX ADMIN — Medication 2 GRAM(S): at 14:11

## 2024-04-10 RX ADMIN — DOSTARLIMAB 500 MILLIGRAM(S): 50 INJECTION INTRAVENOUS at 14:15

## 2024-04-10 RX ADMIN — DOSTARLIMAB 500 MILLIGRAM(S): 50 INJECTION INTRAVENOUS at 14:45

## 2024-04-11 NOTE — REVIEW OF SYSTEMS
[Diarrhea: Grade 0] : Diarrhea: Grade 0 [Negative] : Allergic/Immunologic [Fever] : no fever [Chills] : no chills [Chest Pain] : no chest pain [Palpitations] : no palpitations [Shortness Of Breath] : no shortness of breath [Wheezing] : no wheezing [Dysuria] : no dysuria [Incontinence] : no incontinence [Joint Pain] : no joint pain [Joint Stiffness] : no joint stiffness [FreeTextEntry7] : Abdominal surgical incision healed, no tenderness noted. [FreeTextEntry9] : Cold feet  [de-identified] : Abdominal surgical incision healed, no tenderness noted. Alopecia

## 2024-04-11 NOTE — HISTORY OF PRESENT ILLNESS
[Disease: _____________________] : Disease: [unfilled] [T: ___] : T[unfilled] [N: ___] : N[unfilled] [M: ___] : M[unfilled] [AJCC Stage: ____] : AJCC Stage: [unfilled] [de-identified] : Ms Patel is a 62 year old AA female with PMHx of HTN, DM type II, Rt breast mass (fibroadenoma confirmed on biopsy), Hx of fibroids who presents for the initial consultation of her Stage IV Carcinosarcoma of the uterus.  Patient reports that she had menopause at age of 56. Since then she had been having intermittent vaginal bleeding. Initially it was attributed to vaginal dryness as it was associated with coitus. Later she switched her GYN and she was told that she had fibroids that caused her vaginal bleeding. She changed her GYN again, and this time she had TVS that showed thickened endometrium and fibroids. She was taken for D&C in the August 2023, and the biopsy came back as carcinosarcoma of uterus. She was seen by Gyn-Onc (Dr. Patino) who took her for ( Ro converted to Exlap/MRH/BSO/omentectomy/tumor debulking on 10/16/23 for carcinosarcoma of the endometrium).  Today, its been two weeks since her surgery and she has been recovering well. She reports that she is eating well, weight is stable, bowel habits are normal and denies any vaginal bleed. She has a mid-abdominal scar that that is having some foul-smelling discharge.   Family Hx is not significant She lives with her daughter and mother. 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 [de-identified] : Carcinosarcoma [de-identified] : CA-125 74 [de-identified] : 12/5/23 Pt is s/p 1 cycle of chemo, (Taxol/carbo/dorsarlimab) tolerated well. Had some mild SE which were addressed.  12/26/23 Patient is here to follow up for endometrial cancer. S/P cycle #2 carbo/Taxol, tolerating well except for siffness of fingers post cycle #2 and cold feet. She feels well, denies abdominal pain, nausea, vomiting or abnormal vaginal bleeding or discharge, no fever or chills.  1/16/24 Patient is here to follow up for endometrial cancer. S/P cycle #3 carbo/Taxol and Dostarlimab, tolerating well except for cold feet. She feels well, denies abdominal pain, nausea, vomiting or abnormal vaginal bleeding or discharge, no fever or chills. 2/6/24 Pt is here for follow up. Feels well. Tolerating chemo well. No abd pain, Nausea, vomiting or diarrhea  2/27/24: Pt is here for follow up She endorses peripheral neuropathy but other wise is tolerating chemo well No abd pain, Nausea, vomiting or diarrhea She is scheduled for cycle #6  3/19/24 Pt is here for follow up. Feels well. has mild diarrhea off and on. No abd pain, N, V No vag bleeding.  4/10/24: Pt is here for follow up She endorses peripheral neuropathy but other wise is tolerating immunotherapy  well No abdominal pain , Nausea, vomiting or diarrhea She is scheduled for cycle #8

## 2024-04-11 NOTE — PHYSICAL EXAM
[Restricted in physically strenuous activity but ambulatory and able to carry out work of a light or sedentary nature] : Status 1- Restricted in physically strenuous activity but ambulatory and able to carry out work of a light or sedentary nature, e.g., light house work, office work [Obese] : obese [Normal] : affect appropriate [de-identified] : Abdominal surgical incision healed, no tenderness noted. [de-identified] : Right chest port-A-cath intact, no erythema or tenderness noted. Alopecia

## 2024-04-11 NOTE — ASSESSMENT
[FreeTextEntry1] : In summary, Ms. Patel is a 63 year old AA female with PMHx of HTN, DM type II, Rt breast mass (fibroadenoma confirmed on biopsy), Hx of fibroids with diagnosis of Stage IV Carcinosarcoma of the uterus.  # Stage IV Carcinosarcoma of the uterus # dMMR  and [id number]  - pT3a, Nx, M1 (biopsy proven omental implants) Biomarker Findings Microsatellite status - MS-Stable Tumor Mutational [place] - 4 Muts/Mb Genomic Findings For a complete list of the genes assayed, please refer to the Foundation report BRD4 amplification NOTCH3 amplification, NOTCH3-ZNF90 fusion TP53 Y234C  I had a detailed discussion with the pt regarding the natural history, treatment and prognosis of uterine cancer. There are multiple unfavorable histologic features incl carcinosarcoma histology, metastasis outside uterus and high grade in this case.  Patient tumor was found to be MMR deficient. In GODFREY trial, among the 118 patients with dMMR, MSI-H tumors, 24-month PFS was 61 percent with Dostarlimab versus 16 percent with placebo (HR 0.28, 95% CI, 0.16-0.50). OS at 24 months was 83 percent in the Dostarlimab group and 59 percent in the placebo group (HR 0.30, 95% CI 0.13-0.70). Only 8.9% of the patient population had carcinosarcoma.   She started on chemotherapy consisting of Carboplatin AUC5 and Taxol 175 mg/m2 with Dostarlimab 500 mg IV every three weeks on 11/15/23 x 6.  RECOMMENDATIONS: Previous notes reviewed and all relevant laboratory and radiology results discussed with and were communicated to the patient.  Continue cycle # 9 of  Dostarlimab 500 mg IV every 3 weeks. CBC today reviewed and is adequate. Moderate anemia noted. Dc carboplatin and Taxol.  patient is due to repeat CT Scan of C/A/P with contrast, RX provided on 4/9/24.   *** Side effect profile of Dostarlimab were discussed with pt including but not limited to: anemia, fatigue, hyperglycemia, hyponatremia, hypoalbuminemia, itching, cough, nausea, rash, decreased appetite, hypertriglyceridemia, increased liver enzymes, hypocalcemia, constipation, diarrhea, upper respiratory tract infection and an immune-mediated reaction. Lab work will check for elevated liver enzymes and thyroid function.  The high risk of complications and complexity associated with antineoplastic therapy administration has been explained to the pt and family. Chemotherapy will be given with adequate precautions including premedications, hydration and close monitoring during treatment.  Chemotherapy will be administered under my direct supervision. -- SUPPORTIVE MEASURES discussed: Zofran 8 mg Q 8 hrs prn for emesis and nausea. Compazine Q 6 hrs prn. Encourage adequate fluid intake. GI prophylaxis with PPI. Probiotics -- Labs: CMP, . -- Port flush per protocol. -- Rslts of CT chest/abdomen/pelvis d/w pt. Multiple pulmonary nodules sub cm size Improvement in abdominal fluid collections 2.3 X 1.4 cm pancreatic lesion which will be monitored  WILL MONITOR THESE FINDINGS- Imaging will be repeated after another 2 cycles.   # anemia likely d/t chemo -- Stable, will continue to monitor.   - # Fibroadenoma of Right Breast - She was diagnosed with Biopsy 05/2022. - She is due for mammogram; we will send her for Mammo once she finished her chemotherapy  RTC in 3 weeks with DR. Henry.

## 2024-04-11 NOTE — CONSULT LETTER
[Dear  ___] : Dear  [unfilled], [Consult Letter:] : I had the pleasure of evaluating your patient, [unfilled]. [Please see my note below.] : Please see my note below. [Consult Closing:] : Thank you very much for allowing me to participate in the care of this patient.  If you have any questions, please do not hesitate to contact me. [Sincerely,] : Sincerely, [DrKevin  ___] : Dr. ANDREWS [FreeTextEntry3] : Tammy Henry MD  [facility] Attending Physician [facility] [phone]

## 2024-05-01 ENCOUNTER — LABORATORY RESULT (OUTPATIENT)
Age: 64
End: 2024-05-01

## 2024-05-01 ENCOUNTER — APPOINTMENT (OUTPATIENT)
Age: 64
End: 2024-05-01
Payer: COMMERCIAL

## 2024-05-01 ENCOUNTER — OUTPATIENT (OUTPATIENT)
Dept: OUTPATIENT SERVICES | Facility: HOSPITAL | Age: 64
LOS: 1 days | End: 2024-05-01
Payer: COMMERCIAL

## 2024-05-01 ENCOUNTER — NON-APPOINTMENT (OUTPATIENT)
Age: 64
End: 2024-05-01

## 2024-05-01 VITALS
HEART RATE: 87 BPM | DIASTOLIC BLOOD PRESSURE: 73 MMHG | HEIGHT: 64 IN | WEIGHT: 227.31 LBS | TEMPERATURE: 98.4 F | BODY MASS INDEX: 38.81 KG/M2 | SYSTOLIC BLOOD PRESSURE: 145 MMHG

## 2024-05-01 VITALS — TEMPERATURE: 98 F | SYSTOLIC BLOOD PRESSURE: 145 MMHG | HEART RATE: 87 BPM | DIASTOLIC BLOOD PRESSURE: 73 MMHG

## 2024-05-01 DIAGNOSIS — Z90.49 ACQUIRED ABSENCE OF OTHER SPECIFIED PARTS OF DIGESTIVE TRACT: Chronic | ICD-10-CM

## 2024-05-01 DIAGNOSIS — Z98.890 OTHER SPECIFIED POSTPROCEDURAL STATES: Chronic | ICD-10-CM

## 2024-05-01 DIAGNOSIS — G62.9 POLYNEUROPATHY, UNSPECIFIED: ICD-10-CM

## 2024-05-01 DIAGNOSIS — C54.1 MALIGNANT NEOPLASM OF ENDOMETRIUM: ICD-10-CM

## 2024-05-01 LAB
HCT VFR BLD CALC: 33.1 %
HGB BLD-MCNC: 10.8 G/DL
MCHC RBC-ENTMCNC: 27.3 PG
MCHC RBC-ENTMCNC: 32.6 G/DL
MCV RBC AUTO: 83.6 FL
PLATELET # BLD AUTO: 197 K/UL
PMV BLD: 11.4 FL
RBC # BLD: 3.96 M/UL
RBC # FLD: 13.8 %
WBC # FLD AUTO: 6.65 K/UL

## 2024-05-01 PROCEDURE — 99215 OFFICE O/P EST HI 40 MIN: CPT

## 2024-05-01 PROCEDURE — 85027 COMPLETE CBC AUTOMATED: CPT

## 2024-05-01 PROCEDURE — G2211 COMPLEX E/M VISIT ADD ON: CPT

## 2024-05-01 PROCEDURE — 96413 CHEMO IV INFUSION 1 HR: CPT

## 2024-05-01 PROCEDURE — 80053 COMPREHEN METABOLIC PANEL: CPT

## 2024-05-01 PROCEDURE — 83735 ASSAY OF MAGNESIUM: CPT

## 2024-05-01 PROCEDURE — 86304 IMMUNOASSAY TUMOR CA 125: CPT

## 2024-05-01 PROCEDURE — 36415 COLL VENOUS BLD VENIPUNCTURE: CPT

## 2024-05-01 PROCEDURE — 96367 TX/PROPH/DG ADDL SEQ IV INF: CPT

## 2024-05-01 RX ORDER — MAGNESIUM SULFATE 100 %
2 POWDER (GRAM) MISCELLANEOUS ONCE
Refills: 0 | Status: COMPLETED | OUTPATIENT
Start: 2024-05-01 | End: 2024-05-01

## 2024-05-01 RX ORDER — DOSTARLIMAB 50 MG/ML
500 INJECTION INTRAVENOUS ONCE
Refills: 0 | Status: COMPLETED | OUTPATIENT
Start: 2024-05-01 | End: 2024-05-01

## 2024-05-01 RX ADMIN — DOSTARLIMAB 500 MILLIGRAM(S): 50 INJECTION INTRAVENOUS at 14:20

## 2024-05-01 RX ADMIN — DOSTARLIMAB 500 MILLIGRAM(S): 50 INJECTION INTRAVENOUS at 13:51

## 2024-05-01 RX ADMIN — Medication 25 GRAM(S): at 13:53

## 2024-05-02 DIAGNOSIS — C54.1 MALIGNANT NEOPLASM OF ENDOMETRIUM: ICD-10-CM

## 2024-05-03 LAB
ALBUMIN SERPL ELPH-MCNC: 4.3 G/DL
ALP BLD-CCNC: 65 U/L
ALT SERPL-CCNC: 8 U/L
ANION GAP SERPL CALC-SCNC: 11 MMOL/L
AST SERPL-CCNC: 9 U/L
BILIRUB SERPL-MCNC: 0.2 MG/DL
BUN SERPL-MCNC: 23 MG/DL
CALCIUM SERPL-MCNC: 9.4 MG/DL
CANCER AG125 SERPL-ACNC: 4 U/ML
CHLORIDE SERPL-SCNC: 100 MMOL/L
CO2 SERPL-SCNC: 28 MMOL/L
CREAT SERPL-MCNC: 1 MG/DL
EGFR: 64 ML/MIN/1.73M2
GLUCOSE SERPL-MCNC: 150 MG/DL
MAGNESIUM SERPL-MCNC: 1.4 MG/DL
POTASSIUM SERPL-SCNC: 3.7 MMOL/L
PROT SERPL-MCNC: 6.8 G/DL
SODIUM SERPL-SCNC: 139 MMOL/L

## 2024-05-03 RX ORDER — GABAPENTIN 300 MG/1
300 TABLET ORAL
Qty: 30 | Refills: 2 | Status: ACTIVE | COMMUNITY
Start: 2024-05-01 | End: 1900-01-01

## 2024-05-08 ENCOUNTER — NON-APPOINTMENT (OUTPATIENT)
Age: 64
End: 2024-05-08

## 2024-05-13 NOTE — PHYSICAL EXAM
[Restricted in physically strenuous activity but ambulatory and able to carry out work of a light or sedentary nature] : Status 1- Restricted in physically strenuous activity but ambulatory and able to carry out work of a light or sedentary nature, e.g., light house work, office work [Obese] : obese [Normal] : affect appropriate [de-identified] : Abdominal surgical incision healed, no tenderness noted. [de-identified] : Right chest port-A-cath intact, no erythema or tenderness noted. Alopecia

## 2024-05-13 NOTE — ASSESSMENT
[FreeTextEntry1] : In summary, Ms. Patel is a 63 year old AA female with PMHx of HTN, DM type II, Rt breast mass (fibroadenoma confirmed on biopsy), Hx of fibroids with diagnosis of Stage IV Carcinosarcoma of the uterus.  # Stage IV Carcinosarcoma of the uterus # dMMR  and [id number]  - pT3a, Nx, M1 (biopsy proven omental implants) Biomarker Findings Microsatellite status - MS-Stable Tumor Mutational [place] - 4 Muts/Mb Genomic Findings For a complete list of the genes assayed, please refer to the Foundation report BRD4 amplification NOTCH3 amplification, NOTCH3-ZNF90 fusion TP53 Y234C  I had a detailed discussion with the pt regarding the natural history, treatment and prognosis of uterine cancer. There are multiple unfavorable histologic features incl carcinosarcoma histology, metastasis outside uterus and high grade in this case.  Patient tumor was found to be MMR deficient. In GODFREY trial, among the 118 patients with dMMR, MSI-H tumors, 24-month PFS was 61 percent with Dostarlimab versus 16 percent with placebo (HR 0.28, 95% CI, 0.16-0.50). OS at 24 months was 83 percent in the Dostarlimab group and 59 percent in the placebo group (HR 0.30, 95% CI 0.13-0.70). Only 8.9% of the patient population had carcinosarcoma.   She started on chemotherapy consisting of Carboplatin AUC5 and Taxol 175 mg/m2 with Dostarlimab 500 mg IV every three weeks on 11/15/23 x 6.  RECOMMENDATIONS: Previous notes reviewed and all relevant laboratory and radiology results discussed with and were communicated to the patient.  Continue(cycle # 9 today) Dostarlimab 500 mg IV every 3 weeks. CBC today reviewed and is adequate. Moderate anemia noted. Carboplatin and Taxol discontinued.   patient is due to repeat CT Scan of C/A/P with contrast, RX provided on 4/9/24.   *** Side effect profile of Dostarlimab were discussed with pt including but not limited to: anemia, fatigue, hyperglycemia, hyponatremia, hypoalbuminemia, itching, cough, nausea, rash, decreased appetite, hypertriglyceridemia, increased liver enzymes, hypocalcemia, constipation, diarrhea, upper respiratory tract infection and an immune-mediated reaction. Lab work will check for elevated liver enzymes and thyroid function.  The high risk of complications and complexity associated with antineoplastic therapy administration has been explained to the pt and family. Chemotherapy will be given with adequate precautions including premedications, hydration and close monitoring during treatment.  Chemotherapy will be administered under my direct supervision. -- SUPPORTIVE MEASURES discussed: Zofran 8 mg Q 8 hrs prn for emesis and nausea. Compazine Q 6 hrs prn. Encourage adequate fluid intake. GI prophylaxis with PPI. Probiotics -- Labs: CMP, . -- Port flush per protocol. -- Rslts of CT chest/abdomen/pelvis d/w pt. Multiple pulmonary nodules sub cm size Improvement in abdominal fluid collections 2.3 X 1.4 cm pancreatic lesion which will be monitored  WILL MONITOR THESE FINDINGS- Imaging will be repeated after another 2 cycles.   # anemia likely d/t chemo -- Stable, will continue to monitor.  # Fibroadenoma of Right Breast - She was diagnosed with Biopsy 05/2022. - She is due for mammogram; referral provided.  RTC in 3 weeks with DR. Henry.

## 2024-05-13 NOTE — REVIEW OF SYSTEMS
[Diarrhea: Grade 0] : Diarrhea: Grade 0 [Negative] : Allergic/Immunologic [Fever] : no fever [Chills] : no chills [Chest Pain] : no chest pain [Palpitations] : no palpitations [Shortness Of Breath] : no shortness of breath [Wheezing] : no wheezing [Dysuria] : no dysuria [Incontinence] : no incontinence [Joint Pain] : no joint pain [Joint Stiffness] : no joint stiffness [FreeTextEntry7] : Abdominal surgical incision healed, no tenderness noted. [FreeTextEntry9] : Cold feet  [de-identified] : Abdominal surgical incision healed, no tenderness noted. Alopecia

## 2024-05-13 NOTE — CONSULT LETTER
[Dear  ___] : Dear  [unfilled], [Consult Letter:] : I had the pleasure of evaluating your patient, [unfilled]. [Please see my note below.] : Please see my note below. [Consult Closing:] : Thank you very much for allowing me to participate in the care of this patient.  If you have any questions, please do not hesitate to contact me. [Sincerely,] : Sincerely, [DrKevin  ___] : Dr. ANDREWS [FreeTextEntry3] : Tammy Henry MD  [facility] Attending Physician [facility] [phone]

## 2024-05-13 NOTE — HISTORY OF PRESENT ILLNESS
[Disease: _____________________] : Disease: [unfilled] [T: ___] : T[unfilled] [N: ___] : N[unfilled] [M: ___] : M[unfilled] [AJCC Stage: ____] : AJCC Stage: [unfilled] [de-identified] : Ms Patel is a 62 year old AA female with PMHx of HTN, DM type II, Rt breast mass (fibroadenoma confirmed on biopsy), Hx of fibroids who presents for the initial consultation of her Stage IV Carcinosarcoma of the uterus.  Patient reports that she had menopause at age of 56. Since then she had been having intermittent vaginal bleeding. Initially it was attributed to vaginal dryness as it was associated with coitus. Later she switched her GYN and she was told that she had fibroids that caused her vaginal bleeding. She changed her GYN again, and this time she had TVS that showed thickened endometrium and fibroids. She was taken for D&C in the August 2023, and the biopsy came back as carcinosarcoma of uterus. She was seen by Gyn-Onc (Dr. Patino) who took her for ( Ro converted to Exlap/MRH/BSO/omentectomy/tumor debulking on 10/16/23 for carcinosarcoma of the endometrium).  Today, its been two weeks since her surgery and she has been recovering well. She reports that she is eating well, weight is stable, bowel habits are normal and denies any vaginal bleed. She has a mid-abdominal scar that that is having some foul-smelling discharge.   Family Hx is not significant She lives with her daughter and mother. 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 [de-identified] : Carcinosarcoma [de-identified] : CA-125 74 [de-identified] : 12/5/23 Pt is s/p 1 cycle of chemo, (Taxol/carbo/dorsarlimab) tolerated well. Had some mild SE which were addressed.  12/26/23 Patient is here to follow up for endometrial cancer. S/P cycle #2 carbo/Taxol, tolerating well except for siffness of fingers post cycle #2 and cold feet. She feels well, denies abdominal pain, nausea, vomiting or abnormal vaginal bleeding or discharge, no fever or chills.  1/16/24 Patient is here to follow up for endometrial cancer. S/P cycle #3 carbo/Taxol and Dostarlimab, tolerating well except for cold feet. She feels well, denies abdominal pain, nausea, vomiting or abnormal vaginal bleeding or discharge, no fever or chills. 2/6/24 Pt is here for follow up. Feels well. Tolerating chemo well. No abd pain, Nausea, vomiting or diarrhea  2/27/24: Pt is here for follow up She endorses peripheral neuropathy but other wise is tolerating chemo well No abd pain, Nausea, vomiting or diarrhea She is scheduled for cycle #6  3/19/24 Pt is here for follow up. Feels well. has mild diarrhea off and on. No abd pain, N, V No vag bleeding.  4/10/24: Pt is here for follow up She endorses peripheral neuropathy but other wise is tolerating immunotherapy  well No abdominal pain , Nausea, vomiting or diarrhea She is scheduled for cycle #8  5/1/2024:  Pt is here for follow up She still complaining of peripheral neuropathy, specifically cold sensation of b/l lower extremities. Also endorses on and off loose stools.  No abdominal pain , Nausea, vomiting. She is scheduled for cycle #9 today.  [place] work reviewed.  All qsns answered.

## 2024-05-21 ENCOUNTER — OUTPATIENT (OUTPATIENT)
Dept: OUTPATIENT SERVICES | Facility: HOSPITAL | Age: 64
LOS: 1 days | End: 2024-05-21
Payer: COMMERCIAL

## 2024-05-21 ENCOUNTER — LABORATORY RESULT (OUTPATIENT)
Age: 64
End: 2024-05-21

## 2024-05-21 ENCOUNTER — APPOINTMENT (OUTPATIENT)
Age: 64
End: 2024-05-21
Payer: COMMERCIAL

## 2024-05-21 VITALS — DIASTOLIC BLOOD PRESSURE: 71 MMHG | SYSTOLIC BLOOD PRESSURE: 152 MMHG | TEMPERATURE: 98 F | HEART RATE: 99 BPM

## 2024-05-21 VITALS
DIASTOLIC BLOOD PRESSURE: 71 MMHG | OXYGEN SATURATION: 98 % | TEMPERATURE: 97.8 F | BODY MASS INDEX: 39.45 KG/M2 | HEART RATE: 99 BPM | HEIGHT: 64 IN | WEIGHT: 231.06 LBS | SYSTOLIC BLOOD PRESSURE: 152 MMHG

## 2024-05-21 DIAGNOSIS — C54.1 MALIGNANT NEOPLASM OF ENDOMETRIUM: ICD-10-CM

## 2024-05-21 DIAGNOSIS — Z90.49 ACQUIRED ABSENCE OF OTHER SPECIFIED PARTS OF DIGESTIVE TRACT: Chronic | ICD-10-CM

## 2024-05-21 DIAGNOSIS — Z98.890 OTHER SPECIFIED POSTPROCEDURAL STATES: Chronic | ICD-10-CM

## 2024-05-21 LAB
ALBUMIN SERPL ELPH-MCNC: 4.2 G/DL
ALP BLD-CCNC: 73 U/L
ALT SERPL-CCNC: 9 U/L
ANION GAP SERPL CALC-SCNC: 7 MMOL/L
AST SERPL-CCNC: 10 U/L
BILIRUB SERPL-MCNC: <0.2 MG/DL
BUN SERPL-MCNC: 21 MG/DL
CALCIUM SERPL-MCNC: 9.3 MG/DL
CHLORIDE SERPL-SCNC: 100 MMOL/L
CO2 SERPL-SCNC: 31 MMOL/L
CREAT SERPL-MCNC: 1 MG/DL
EGFR: 64 ML/MIN/1.73M2
GLUCOSE SERPL-MCNC: 125 MG/DL
HCT VFR BLD CALC: 34 %
HGB BLD-MCNC: 10.8 G/DL
MAGNESIUM SERPL-MCNC: 1.6 MG/DL
MCHC RBC-ENTMCNC: 26.3 PG
MCHC RBC-ENTMCNC: 31.8 G/DL
MCV RBC AUTO: 82.9 FL
PLATELET # BLD AUTO: 225 K/UL
PMV BLD: 10.6 FL
POTASSIUM SERPL-SCNC: 3.9 MMOL/L
PROT SERPL-MCNC: 6.3 G/DL
RBC # BLD: 4.1 M/UL
RBC # FLD: 13.7 %
SODIUM SERPL-SCNC: 138 MMOL/L
WBC # FLD AUTO: 6.46 K/UL

## 2024-05-21 PROCEDURE — 99215 OFFICE O/P EST HI 40 MIN: CPT

## 2024-05-21 PROCEDURE — 96413 CHEMO IV INFUSION 1 HR: CPT

## 2024-05-21 PROCEDURE — 85027 COMPLETE CBC AUTOMATED: CPT

## 2024-05-21 PROCEDURE — 84443 ASSAY THYROID STIM HORMONE: CPT

## 2024-05-21 PROCEDURE — 83735 ASSAY OF MAGNESIUM: CPT

## 2024-05-21 PROCEDURE — 86304 IMMUNOASSAY TUMOR CA 125: CPT

## 2024-05-21 PROCEDURE — G2211 COMPLEX E/M VISIT ADD ON: CPT | Mod: NC,1L

## 2024-05-21 PROCEDURE — 84439 ASSAY OF FREE THYROXINE: CPT

## 2024-05-21 PROCEDURE — 96367 TX/PROPH/DG ADDL SEQ IV INF: CPT

## 2024-05-21 PROCEDURE — 36415 COLL VENOUS BLD VENIPUNCTURE: CPT

## 2024-05-21 PROCEDURE — 80053 COMPREHEN METABOLIC PANEL: CPT

## 2024-05-21 RX ORDER — DOSTARLIMAB 50 MG/ML
500 INJECTION INTRAVENOUS ONCE
Refills: 0 | Status: COMPLETED | OUTPATIENT
Start: 2024-05-21 | End: 2024-05-21

## 2024-05-21 RX ORDER — MAGNESIUM SULFATE 500 MG/ML
2 VIAL (ML) INJECTION ONCE
Refills: 0 | Status: COMPLETED | OUTPATIENT
Start: 2024-05-21 | End: 2024-05-21

## 2024-05-21 RX ADMIN — Medication 2 GRAM(S): at 15:10

## 2024-05-21 RX ADMIN — DOSTARLIMAB 500 MILLIGRAM(S): 50 INJECTION INTRAVENOUS at 13:25

## 2024-05-21 RX ADMIN — DOSTARLIMAB 500 MILLIGRAM(S): 50 INJECTION INTRAVENOUS at 13:55

## 2024-05-21 RX ADMIN — Medication 25 GRAM(S): at 14:00

## 2024-05-21 NOTE — REVIEW OF SYSTEMS
[Diarrhea: Grade 0] : Diarrhea: Grade 0 [Negative] : Allergic/Immunologic [Fever] : no fever [Chills] : no chills [Chest Pain] : no chest pain [Palpitations] : no palpitations [Shortness Of Breath] : no shortness of breath [Wheezing] : no wheezing [Dysuria] : no dysuria [Incontinence] : no incontinence [Joint Pain] : no joint pain [Joint Stiffness] : no joint stiffness [FreeTextEntry7] : Abdominal surgical incision healed, no tenderness noted. [FreeTextEntry9] : Cold feet  [de-identified] : Abdominal surgical incision healed, no tenderness noted. Alopecia

## 2024-05-21 NOTE — HISTORY OF PRESENT ILLNESS
[Disease: _____________________] : Disease: [unfilled] [T: ___] : T[unfilled] [N: ___] : N[unfilled] [M: ___] : M[unfilled] [AJCC Stage: ____] : AJCC Stage: [unfilled] [de-identified] : Ms Patel is a 62 year old AA female with PMHx of HTN, DM type II, Rt breast mass (fibroadenoma confirmed on biopsy), Hx of fibroids who presents for the initial consultation of her Stage IV Carcinosarcoma of the uterus.  Patient reports that she had menopause at age of 56. Since then she had been having intermittent vaginal bleeding. Initially it was attributed to vaginal dryness as it was associated with coitus. Later she switched her GYN and she was told that she had fibroids that caused her vaginal bleeding. She changed her GYN again, and this time she had TVS that showed thickened endometrium and fibroids. She was taken for D&C in the August 2023, and the biopsy came back as carcinosarcoma of uterus. She was seen by Gyn-Onc (Dr. Patino) who took her for ( Ro converted to Exlap/MRH/BSO/omentectomy/tumor debulking on 10/16/23 for carcinosarcoma of the endometrium).  Today, its been two weeks since her surgery and she has been recovering well. She reports that she is eating well, weight is stable, bowel habits are normal and denies any vaginal bleed. She has a mid-abdominal scar that that is having some foul-smelling discharge.   Family Hx is not significant She lives with her daughter and mother. 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 [de-identified] : Carcinosarcoma [de-identified] : CA-125 74 [de-identified] : 12/5/23 Pt is s/p 1 cycle of chemo, (Taxol/carbo/dorsarlimab) tolerated well. Had some mild SE which were addressed.  12/26/23 Patient is here to follow up for endometrial cancer. S/P cycle #2 carbo/Taxol, tolerating well except for siffness of fingers post cycle #2 and cold feet. She feels well, denies abdominal pain, nausea, vomiting or abnormal vaginal bleeding or discharge, no fever or chills.  1/16/24 Patient is here to follow up for endometrial cancer. S/P cycle #3 carbo/Taxol and Dostarlimab, tolerating well except for cold feet. She feels well, denies abdominal pain, nausea, vomiting or abnormal vaginal bleeding or discharge, no fever or chills. 2/6/24 Pt is here for follow up. Feels well. Tolerating chemo well. No abd pain, Nausea, vomiting or diarrhea  2/27/24: Pt is here for follow up She endorses peripheral neuropathy but other wise is tolerating chemo well No abd pain, Nausea, vomiting or diarrhea She is scheduled for cycle #6  3/19/24 Pt is here for follow up. Feels well. has mild diarrhea off and on. No abd pain, N, V No vag bleeding.  4/10/24: Pt is here for follow up She endorses peripheral neuropathy but other wise is tolerating immunotherapy  well No abdominal pain , Nausea, vomiting or diarrhea She is scheduled for cycle #8  5/1/2024:  Pt is here for follow up She still complaining of peripheral neuropathy, specifically cold sensation of b/l lower extremities. Also endorses on and off loose stools.  No abdominal pain , Nausea, vomiting. She is scheduled for cycle #9 today.  Bllod work reviewed.  All qsns answered.   5/21/24 Pt is here for follow up. Her neuropathy has improved on Gabapentin. No abdominal pain, Nausea, vomiting. She is scheduled for cycle #10 of Dostarlimab today.  CT findings discussed, will continue to follow up with imaging.

## 2024-05-21 NOTE — ASSESSMENT
[FreeTextEntry1] : In summary, Ms. Patel is a 63 year old AA female with PMHx of HTN, DM type II, Rt breast mass (fibroadenoma confirmed on biopsy), Hx of fibroids with diagnosis of Stage IV Carcinosarcoma of the uterus.  # Stage IV Carcinosarcoma of the uterus # dMMR  and [id number]  - pT3a, Nx, M1 (biopsy proven omental implants) Biomarker Findings Microsatellite status - MS-Stable Tumor Mutational [place] - 4 Muts/Mb Genomic Findings For a complete list of the genes assayed, please refer to the Foundation report BRD4 amplification NOTCH3 amplification, NOTCH3-ZNF90 fusion TP53 Y234C  I had a detailed discussion with the pt regarding the natural history, treatment and prognosis of uterine cancer. There are multiple unfavorable histologic features incl carcinosarcoma histology, metastasis outside uterus and high grade in this case.  Patient tumor was found to be MMR deficient. In GODFREY trial, among the 118 patients with dMMR, MSI-H tumors, 24-month PFS was 61 percent with Dostarlimab versus 16 percent with placebo (HR 0.28, 95% CI, 0.16-0.50). OS at 24 months was 83 percent in the Dostarlimab group and 59 percent in the placebo group (HR 0.30, 95% CI 0.13-0.70). Only 8.9% of the patient population had carcinosarcoma.   She started on chemotherapy consisting of Carboplatin AUC5 and Taxol 175 mg/m2 with Dostarlimab 500 mg IV every three weeks on 11/15/23 x 6.  RECOMMENDATIONS: Previous notes reviewed and all relevant laboratory and radiology results discussed with and were communicated to the patient.  Continue(cycle # 10 today) Dostarlimab 500 mg IV every 3 weeks. CBC today reviewed and is adequate. Moderate anemia noted. Carboplatin and Taxol discontinued.   patient had repeat CT Scan of C/A/P with contrast, 5/10/24  B/L pulm nodules 2-5mm, slightly increased in size subcarinal LN 2cm from 1.2 cm before 1.2cm RP lymph node, stable pancreatic cyst ( WILL MONITOR CLOSELY WITH RPT SCAN IN 7/24)  *** Side effect profile of Dostarlimab were discussed with pt including but not limited to: anemia, fatigue, hyperglycemia, hyponatremia, hypoalbuminemia, itching, cough, nausea, rash, decreased appetite, hypertriglyceridemia, increased liver enzymes, hypocalcemia, constipation, diarrhea, upper respiratory tract infection and an immune-mediated reaction. Lab work will check for elevated liver enzymes and thyroid function.  The high risk of complications and complexity associated with antineoplastic therapy administration has been explained to the pt and family. Chemotherapy will be given with adequate precautions including premedications, hydration and close monitoring during treatment.  Chemotherapy will be administered under my direct supervision. -- SUPPORTIVE MEASURES discussed: Zofran 8 mg Q 8 hrs prn for emesis and nausea. Compazine Q 6 hrs prn. Encourage adequate fluid intake. GI prophylaxis with PPI. Probiotics -- Labs: CMP, . -- Port flush per protocol. -- Rslts of CT chest/abdomen/pelvis d/w pt.   # anemia likely d/t chemo -- Stable, will continue to monitor.  # Fibroadenoma of Right Breast - She was diagnosed with Biopsy 05/2022. - She is due for mammogram; referral provided.  RTC in 3 weeks

## 2024-05-21 NOTE — CONSULT LETTER
[Dear  ___] : Dear  [unfilled], [Consult Letter:] : I had the pleasure of evaluating your patient, [unfilled]. [Please see my note below.] : Please see my note below. [Consult Closing:] : Thank you very much for allowing me to participate in the care of this patient.  If you have any questions, please do not hesitate to contact me. [Sincerely,] : Sincerely, [DrKevin  ___] : Dr. ANDREWS [FreeTextEntry3] : Tammy Henry MD  [facility] Attending Physician [facility] [phone]

## 2024-05-21 NOTE — PHYSICAL EXAM
[Restricted in physically strenuous activity but ambulatory and able to carry out work of a light or sedentary nature] : Status 1- Restricted in physically strenuous activity but ambulatory and able to carry out work of a light or sedentary nature, e.g., light house work, office work [Obese] : obese [Normal] : affect appropriate [de-identified] : Abdominal surgical incision healed, no tenderness noted. [de-identified] : Right chest port-A-cath intact, no erythema or tenderness noted. Alopecia

## 2024-05-22 LAB
CANCER AG125 SERPL-ACNC: 3 U/ML
T4 FREE SERPL-MCNC: 1.3 NG/DL
TSH SERPL-ACNC: 1.28 UIU/ML

## 2024-05-28 ENCOUNTER — APPOINTMENT (OUTPATIENT)
Age: 64
End: 2024-05-28

## 2024-05-29 LAB — MAGNESIUM SERPL-MCNC: 1.6 MG/DL

## 2024-06-07 ENCOUNTER — APPOINTMENT (OUTPATIENT)
Dept: GYNECOLOGIC ONCOLOGY | Facility: CLINIC | Age: 64
End: 2024-06-07
Payer: COMMERCIAL

## 2024-06-07 VITALS
SYSTOLIC BLOOD PRESSURE: 135 MMHG | HEIGHT: 64 IN | WEIGHT: 228 LBS | HEART RATE: 74 BPM | BODY MASS INDEX: 38.93 KG/M2 | DIASTOLIC BLOOD PRESSURE: 74 MMHG

## 2024-06-07 PROCEDURE — 99214 OFFICE O/P EST MOD 30 MIN: CPT

## 2024-06-07 NOTE — PHYSICAL EXAM
[Absent] : Adnexa(ae): Absent [Normal] : Recto-Vaginal Exam: Normal [FreeTextEntry1] : NAD [de-identified] : HECTOR [de-identified] : no edema [de-identified] : soft NT/ND  [de-identified] : well healed [de-identified] : no masses [Fully active, able to carry on all pre-disease performance without restriction] : Status 0 - Fully active, able to carry on all pre-disease performance without restriction

## 2024-06-07 NOTE — DISCUSSION/SUMMARY
[Reviewed Clinical Lab Test(s)] : Results of clinical tests were reviewed. [Reviewed Radiology Report(s)] : Radiology reports were reviewed. [Reviewed Radiology Film/Image(s)] : Images from radiology studies were reviewed and examined. [Visit Time ___ Minutes] : [unfilled] minutes [Face to Face Time___ Minutes] : with [unfilled] minutes in face to face consultation. [FreeTextEntry1] : Discussed my review of the images with the patient, not clear at this point if there is any active disease. Her prognosis of course is guarded. Plan for PET in 1-2 months to further characterize the lesions seen on CT.  All questions answered, concerns addressed, mammogram ordered.  Return in 3 months.

## 2024-06-07 NOTE — HISTORY OF PRESENT ILLNESS
[FreeTextEntry1] : 63 y/o with stage IVB carcinosarcoma of the uterus  s/p Ro converted to Exlap/MRH/BSO/omentectomy/tumor debulking on 10/16/23 .(+ omentum)  now s/p 6 cycle of chemo, (Taxol/carbo/dorsarlimab) tolerated well Currently on maintenance Dostarlimab  No other complaints.

## 2024-06-07 NOTE — ASSESSMENT
[FreeTextEntry1] : Reviewed remotely personally the CT C/A/P from last month. No definitive abnormalities seen on my review, the lung lesions are TSTC. In the abdomen the lesions seen are not in a typical area of concern.  Clinically TONNY

## 2024-06-10 ENCOUNTER — APPOINTMENT (OUTPATIENT)
Age: 64
End: 2024-06-10
Payer: COMMERCIAL

## 2024-06-10 ENCOUNTER — LABORATORY RESULT (OUTPATIENT)
Age: 64
End: 2024-06-10

## 2024-06-10 ENCOUNTER — OUTPATIENT (OUTPATIENT)
Dept: OUTPATIENT SERVICES | Facility: HOSPITAL | Age: 64
LOS: 1 days | End: 2024-06-10
Payer: COMMERCIAL

## 2024-06-10 VITALS — HEART RATE: 91 BPM | TEMPERATURE: 98 F | SYSTOLIC BLOOD PRESSURE: 138 MMHG

## 2024-06-10 VITALS
SYSTOLIC BLOOD PRESSURE: 138 MMHG | DIASTOLIC BLOOD PRESSURE: 74 MMHG | WEIGHT: 230 LBS | BODY MASS INDEX: 39.27 KG/M2 | HEIGHT: 64 IN | TEMPERATURE: 98.1 F | HEART RATE: 91 BPM

## 2024-06-10 DIAGNOSIS — Z98.890 OTHER SPECIFIED POSTPROCEDURAL STATES: Chronic | ICD-10-CM

## 2024-06-10 DIAGNOSIS — Z29.89 ENCOUNTER. FOR OTHER SPECIFIED PROPHYLACTIC MEASURES: ICD-10-CM

## 2024-06-10 DIAGNOSIS — C54.1 MALIGNANT NEOPLASM OF ENDOMETRIUM: ICD-10-CM

## 2024-06-10 DIAGNOSIS — Z90.49 ACQUIRED ABSENCE OF OTHER SPECIFIED PARTS OF DIGESTIVE TRACT: Chronic | ICD-10-CM

## 2024-06-10 PROCEDURE — 99213 OFFICE O/P EST LOW 20 MIN: CPT

## 2024-06-10 PROCEDURE — 96367 TX/PROPH/DG ADDL SEQ IV INF: CPT

## 2024-06-10 PROCEDURE — 83735 ASSAY OF MAGNESIUM: CPT

## 2024-06-10 PROCEDURE — 85027 COMPLETE CBC AUTOMATED: CPT

## 2024-06-10 PROCEDURE — 36415 COLL VENOUS BLD VENIPUNCTURE: CPT

## 2024-06-10 PROCEDURE — 80053 COMPREHEN METABOLIC PANEL: CPT

## 2024-06-10 PROCEDURE — 96413 CHEMO IV INFUSION 1 HR: CPT

## 2024-06-10 RX ORDER — DOSTARLIMAB 50 MG/ML
500 INJECTION INTRAVENOUS ONCE
Refills: 0 | Status: COMPLETED | OUTPATIENT
Start: 2024-06-10 | End: 2024-06-10

## 2024-06-10 RX ADMIN — Medication 25 GRAM(S): at 13:27

## 2024-06-10 RX ADMIN — Medication 2 GRAM(S): at 14:30

## 2024-06-10 RX ADMIN — DOSTARLIMAB 500 MILLIGRAM(S): 50 INJECTION INTRAVENOUS at 14:35

## 2024-06-10 RX ADMIN — DOSTARLIMAB 500 MILLIGRAM(S): 50 INJECTION INTRAVENOUS at 15:05

## 2024-06-10 NOTE — ASSESSMENT
[FreeTextEntry1] : In summary, Ms. Patel is a 63 year old AA female with PMHx of HTN, DM type II, Rt breast mass (fibroadenoma confirmed on biopsy), Hx of fibroids with diagnosis of Stage IV Carcinosarcoma of the uterus.  # Stage IV Carcinosarcoma of the uterus # dMMR  and [id number]  - pT3a, Nx, M1 (biopsy proven omental implants) Biomarker Findings Microsatellite status - MS-Stable Tumor Mutational [place] - 4 Muts/Mb Genomic Findings For a complete list of the genes assayed, please refer to the Foundation report BRD4 amplification NOTCH3 amplification, NOTCH3-ZNF90 fusion TP53 Y234C  I had a detailed discussion with the pt regarding the natural history, treatment and prognosis of uterine cancer. There are multiple unfavorable histologic features incl carcinosarcoma histology, metastasis outside uterus and high grade in this case.  Patient tumor was found to be MMR deficient. In GODFREY trial, among the 118 patients with dMMR, MSI-H tumors, 24-month PFS was 61 percent with Dostarlimab versus 16 percent with placebo (HR 0.28, 95% CI, 0.16-0.50). OS at 24 months was 83 percent in the Dostarlimab group and 59 percent in the placebo group (HR 0.30, 95% CI 0.13-0.70). Only 8.9% of the patient population had carcinosarcoma.   She started on chemotherapy consisting of Carboplatin AUC5 and Taxol 175 mg/m2 with Dostarlimab 500 mg IV every three weeks on 11/15/23 x 6.  RECOMMENDATIONS: Previous notes reviewed and all relevant laboratory and radiology results discussed with and were communicated to the patient.  Continue(cycle # 11 today) Dostarlimab 500 mg IV every 3 weeks. CBC today reviewed and is adequate. Moderate anemia noted. Carboplatin and Taxol discontinued.   patient had repeat CT Scan of C/A/P with contrast, 5/10/24  B/L pulm nodules 2-5mm, slightly increased in size subcarinal LN 2cm from 1.2 cm before 1.2cm RP lymph node, stable pancreatic cyst ( WILL MONITOR CLOSELY WITH RPT SCAN IN 7/24)  *** Side effect profile of Dostarlimab were discussed with pt including but not limited to: anemia, fatigue, hyperglycemia, hyponatremia, hypoalbuminemia, itching, cough, nausea, rash, decreased appetite, hypertriglyceridemia, increased liver enzymes, hypocalcemia, constipation, diarrhea, upper respiratory tract infection and an immune-mediated reaction. Lab work will check for elevated liver enzymes and thyroid function.  The high risk of complications and complexity associated with antineoplastic therapy administration has been explained to the pt and family. Chemotherapy will be given with adequate precautions including premedications, hydration and close monitoring during treatment.  Chemotherapy will be administered under my direct supervision. -- SUPPORTIVE MEASURES discussed: Zofran 8 mg Q 8 hrs prn for emesis and nausea. Compazine Q 6 hrs prn. Encourage adequate fluid intake. GI prophylaxis with PPI. Probiotics -- Labs: CMP, . -- Port flush per protocol.  # anemia likely d/t chemo -- Stable, will continue to monitor.  #hypomagnesemia - Mg Sulfate to be given today IV with treatment   # Fibroadenoma of Right Breast - She was diagnosed with Biopsy 05/2022. - She is due for mammogram; referral provided last visit will request results   RTC in 3 weeks

## 2024-06-10 NOTE — HISTORY OF PRESENT ILLNESS
[Disease: _____________________] : Disease: [unfilled] [T: ___] : T[unfilled] [N: ___] : N[unfilled] [M: ___] : M[unfilled] [AJCC Stage: ____] : AJCC Stage: [unfilled] [de-identified] : Ms Patel is a 62 year old AA female with PMHx of HTN, DM type II, Rt breast mass (fibroadenoma confirmed on biopsy), Hx of fibroids who presents for the initial consultation of her Stage IV Carcinosarcoma of the uterus.  Patient reports that she had menopause at age of 56. Since then she had been having intermittent vaginal bleeding. Initially it was attributed to vaginal dryness as it was associated with coitus. Later she switched her GYN and she was told that she had fibroids that caused her vaginal bleeding. She changed her GYN again, and this time she had TVS that showed thickened endometrium and fibroids. She was taken for D&C in the August 2023, and the biopsy came back as carcinosarcoma of uterus. She was seen by Gyn-Onc (Dr. Patino) who took her for ( Ro converted to Exlap/MRH/BSO/omentectomy/tumor debulking on 10/16/23 for carcinosarcoma of the endometrium).  Today, its been two weeks since her surgery and she has been recovering well. She reports that she is eating well, weight is stable, bowel habits are normal and denies any vaginal bleed. She has a mid-abdominal scar that that is having some foul-smelling discharge.   Family Hx is not significant She lives with her daughter and mother. 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 [de-identified] : Carcinosarcoma [de-identified] : CA-125 74 [de-identified] : 12/5/23 Pt is s/p 1 cycle of chemo, (Taxol/carbo/dorsarlimab) tolerated well. Had some mild SE which were addressed.  12/26/23 Patient is here to follow up for endometrial cancer. S/P cycle #2 carbo/Taxol, tolerating well except for siffness of fingers post cycle #2 and cold feet. She feels well, denies abdominal pain, nausea, vomiting or abnormal vaginal bleeding or discharge, no fever or chills.  1/16/24 Patient is here to follow up for endometrial cancer. S/P cycle #3 carbo/Taxol and Dostarlimab, tolerating well except for cold feet. She feels well, denies abdominal pain, nausea, vomiting or abnormal vaginal bleeding or discharge, no fever or chills. 2/6/24 Pt is here for follow up. Feels well. Tolerating chemo well. No abd pain, Nausea, vomiting or diarrhea  2/27/24: Pt is here for follow up She endorses peripheral neuropathy but other wise is tolerating chemo well No abd pain, Nausea, vomiting or diarrhea She is scheduled for cycle #6  3/19/24 Pt is here for follow up. Feels well. has mild diarrhea off and on. No abd pain, N, V No vag bleeding.  4/10/24: Pt is here for follow up She endorses peripheral neuropathy but other wise is tolerating immunotherapy  well No abdominal pain , Nausea, vomiting or diarrhea She is scheduled for cycle #8  5/1/2024:  Pt is here for follow up She still complaining of peripheral neuropathy, specifically cold sensation of b/l lower extremities. Also endorses on and off loose stools.  No abdominal pain , Nausea, vomiting. She is scheduled for cycle #9 today.  Bllod work reviewed.  All qsns answered.   5/21/24 Pt is here for follow up. Her neuropathy has improved on Gabapentin. No abdominal pain, Nausea, vomiting. She is scheduled for cycle #10 of Dostarlimab today.  CT findings discussed, will continue to follow up with imaging.  06/10/2024 Pt is here for follow up. Her neuropathy has improved on Gabapentin. No abdominal pain, Nausea, vomiting. She is scheduled for cycle #11 of Dostarlimab today.

## 2024-06-10 NOTE — REVIEW OF SYSTEMS
[Fever] : no fever [Chills] : no chills [Chest Pain] : no chest pain [Palpitations] : no palpitations [Shortness Of Breath] : no shortness of breath [Wheezing] : no wheezing [Diarrhea: Grade 0] : Diarrhea: Grade 0 [Dysuria] : no dysuria [Incontinence] : no incontinence [Joint Pain] : no joint pain [Joint Stiffness] : no joint stiffness [Negative] : Allergic/Immunologic [FreeTextEntry7] : Abdominal surgical incision healed, no tenderness noted. [FreeTextEntry9] : Cold feet  [de-identified] : Abdominal surgical incision healed, no tenderness noted. Alopecia

## 2024-06-10 NOTE — PHYSICAL EXAM
[Restricted in physically strenuous activity but ambulatory and able to carry out work of a light or sedentary nature] : Status 1- Restricted in physically strenuous activity but ambulatory and able to carry out work of a light or sedentary nature, e.g., light house work, office work [Obese] : obese [Normal] : affect appropriate [de-identified] : Abdominal surgical incision healed, no tenderness noted. [de-identified] : Right chest port-A-cath intact, no erythema or tenderness noted. Alopecia

## 2024-06-10 NOTE — CONSULT LETTER
[Dear  ___] : Dear  [unfilled], [Consult Letter:] : I had the pleasure of evaluating your patient, [unfilled]. [Please see my note below.] : Please see my note below. [Consult Closing:] : Thank you very much for allowing me to participate in the care of this patient.  If you have any questions, please do not hesitate to contact me. [Sincerely,] : Sincerely, [FreeTextEntry3] : Tammy Henry MD  [facility] Attending Physician [facility] [phone]  [DrKevin  ___] : Dr. ANDREWS

## 2024-06-11 DIAGNOSIS — C54.1 MALIGNANT NEOPLASM OF ENDOMETRIUM: ICD-10-CM

## 2024-06-17 LAB
ALBUMIN SERPL ELPH-MCNC: 4.1 G/DL
ALP BLD-CCNC: 71 U/L
ALT SERPL-CCNC: 8 U/L
ANION GAP SERPL CALC-SCNC: 12 MMOL/L
AST SERPL-CCNC: 9 U/L
BILIRUB SERPL-MCNC: <0.2 MG/DL
BUN SERPL-MCNC: 23 MG/DL
CALCIUM SERPL-MCNC: 8.8 MG/DL
CHLORIDE SERPL-SCNC: 101 MMOL/L
CO2 SERPL-SCNC: 27 MMOL/L
CREAT SERPL-MCNC: 1 MG/DL
EGFR: 64 ML/MIN/1.73M2
GLUCOSE SERPL-MCNC: 147 MG/DL
HCT VFR BLD CALC: 33 %
HGB BLD-MCNC: 10.6 G/DL
MAGNESIUM SERPL-MCNC: 1.7 MG/DL
MCHC RBC-ENTMCNC: 26.6 PG
MCHC RBC-ENTMCNC: 32.1 G/DL
MCV RBC AUTO: 82.7 FL
PLATELET # BLD AUTO: 215 K/UL
PMV BLD: 10.7 FL
POTASSIUM SERPL-SCNC: 3.7 MMOL/L
PROT SERPL-MCNC: 6.4 G/DL
RBC # BLD: 3.99 M/UL
RBC # FLD: 13.4 %
SODIUM SERPL-SCNC: 140 MMOL/L
WBC # FLD AUTO: 7.15 K/UL

## 2024-07-01 ENCOUNTER — APPOINTMENT (OUTPATIENT)
Age: 64
End: 2024-07-01
Payer: COMMERCIAL

## 2024-07-01 ENCOUNTER — OUTPATIENT (OUTPATIENT)
Dept: OUTPATIENT SERVICES | Facility: HOSPITAL | Age: 64
LOS: 1 days | End: 2024-07-01
Payer: COMMERCIAL

## 2024-07-01 ENCOUNTER — LABORATORY RESULT (OUTPATIENT)
Age: 64
End: 2024-07-01

## 2024-07-01 VITALS
OXYGEN SATURATION: 97 % | RESPIRATION RATE: 16 BRPM | BODY MASS INDEX: 39.45 KG/M2 | TEMPERATURE: 98.5 F | HEIGHT: 64 IN | HEART RATE: 77 BPM | WEIGHT: 231.06 LBS | DIASTOLIC BLOOD PRESSURE: 68 MMHG | SYSTOLIC BLOOD PRESSURE: 131 MMHG

## 2024-07-01 DIAGNOSIS — Z98.890 OTHER SPECIFIED POSTPROCEDURAL STATES: Chronic | ICD-10-CM

## 2024-07-01 DIAGNOSIS — Z90.49 ACQUIRED ABSENCE OF OTHER SPECIFIED PARTS OF DIGESTIVE TRACT: Chronic | ICD-10-CM

## 2024-07-01 DIAGNOSIS — C54.1 MALIGNANT NEOPLASM OF ENDOMETRIUM: ICD-10-CM

## 2024-07-01 PROBLEM — Z51.11 ENCOUNTER FOR CHEMOTHERAPY MANAGEMENT: Status: ACTIVE | Noted: 2023-10-30

## 2024-07-01 LAB
HCT VFR BLD CALC: 31.9 %
HGB BLD-MCNC: 10.4 G/DL
MCHC RBC-ENTMCNC: 26.7 PG
MCHC RBC-ENTMCNC: 32.6 G/DL
MCV RBC AUTO: 81.8 FL
PLATELET # BLD AUTO: 213 K/UL
PMV BLD: 10.6 FL
RBC # BLD: 3.9 M/UL
RBC # FLD: 13.9 %
WBC # FLD AUTO: 6.15 K/UL

## 2024-07-01 PROCEDURE — G2211 COMPLEX E/M VISIT ADD ON: CPT | Mod: NC

## 2024-07-01 PROCEDURE — 83735 ASSAY OF MAGNESIUM: CPT

## 2024-07-01 PROCEDURE — 85027 COMPLETE CBC AUTOMATED: CPT

## 2024-07-01 PROCEDURE — 36415 COLL VENOUS BLD VENIPUNCTURE: CPT

## 2024-07-01 PROCEDURE — 84443 ASSAY THYROID STIM HORMONE: CPT

## 2024-07-01 PROCEDURE — 84439 ASSAY OF FREE THYROXINE: CPT

## 2024-07-01 PROCEDURE — 99215 OFFICE O/P EST HI 40 MIN: CPT

## 2024-07-01 PROCEDURE — 82378 CARCINOEMBRYONIC ANTIGEN: CPT

## 2024-07-01 PROCEDURE — 96413 CHEMO IV INFUSION 1 HR: CPT

## 2024-07-01 PROCEDURE — 80053 COMPREHEN METABOLIC PANEL: CPT

## 2024-07-01 RX ORDER — DOSTARLIMAB 50 MG/ML
500 INJECTION INTRAVENOUS ONCE
Refills: 0 | Status: COMPLETED | OUTPATIENT
Start: 2024-07-01 | End: 2024-07-01

## 2024-07-01 RX ADMIN — DOSTARLIMAB 500 MILLIGRAM(S): 50 INJECTION INTRAVENOUS at 05:25

## 2024-07-01 RX ADMIN — DOSTARLIMAB 500 MILLIGRAM(S): 50 INJECTION INTRAVENOUS at 14:53

## 2024-07-02 DIAGNOSIS — C54.1 MALIGNANT NEOPLASM OF ENDOMETRIUM: ICD-10-CM

## 2024-07-02 LAB
ALBUMIN SERPL ELPH-MCNC: 4.3 G/DL
ALP BLD-CCNC: 70 U/L
ALT SERPL-CCNC: 9 U/L
ANION GAP SERPL CALC-SCNC: 12 MMOL/L
AST SERPL-CCNC: 10 U/L
BILIRUB SERPL-MCNC: 0.3 MG/DL
BUN SERPL-MCNC: 24 MG/DL
CALCIUM SERPL-MCNC: 9.4 MG/DL
CEA SERPL-MCNC: 1.6 NG/ML
CHLORIDE SERPL-SCNC: 103 MMOL/L
CO2 SERPL-SCNC: 25 MMOL/L
CREAT SERPL-MCNC: 1 MG/DL
EGFR: 64 ML/MIN/1.73M2
GLUCOSE SERPL-MCNC: 91 MG/DL
MAGNESIUM SERPL-MCNC: 1.8 MG/DL
POTASSIUM SERPL-SCNC: 4.3 MMOL/L
PROT SERPL-MCNC: 6.5 G/DL
SODIUM SERPL-SCNC: 140 MMOL/L
T4 FREE SERPL-MCNC: 1.3 NG/DL
TSH SERPL-ACNC: 0.98 UIU/ML

## 2024-07-22 ENCOUNTER — LABORATORY RESULT (OUTPATIENT)
Age: 64
End: 2024-07-22

## 2024-07-22 ENCOUNTER — OUTPATIENT (OUTPATIENT)
Dept: OUTPATIENT SERVICES | Facility: HOSPITAL | Age: 64
LOS: 1 days | End: 2024-07-22
Payer: COMMERCIAL

## 2024-07-22 ENCOUNTER — APPOINTMENT (OUTPATIENT)
Age: 64
End: 2024-07-22
Payer: COMMERCIAL

## 2024-07-22 VITALS — HEART RATE: 83 BPM | DIASTOLIC BLOOD PRESSURE: 76 MMHG | SYSTOLIC BLOOD PRESSURE: 147 MMHG | TEMPERATURE: 98 F

## 2024-07-22 VITALS
TEMPERATURE: 98.2 F | HEART RATE: 83 BPM | OXYGEN SATURATION: 97 % | BODY MASS INDEX: 40.12 KG/M2 | SYSTOLIC BLOOD PRESSURE: 147 MMHG | DIASTOLIC BLOOD PRESSURE: 76 MMHG | HEIGHT: 64 IN | WEIGHT: 235 LBS | RESPIRATION RATE: 16 BRPM

## 2024-07-22 DIAGNOSIS — Z98.890 OTHER SPECIFIED POSTPROCEDURAL STATES: Chronic | ICD-10-CM

## 2024-07-22 DIAGNOSIS — C54.1 MALIGNANT NEOPLASM OF ENDOMETRIUM: ICD-10-CM

## 2024-07-22 DIAGNOSIS — Z90.49 ACQUIRED ABSENCE OF OTHER SPECIFIED PARTS OF DIGESTIVE TRACT: Chronic | ICD-10-CM

## 2024-07-22 DIAGNOSIS — Z51.11 ENCOUNTER FOR ANTINEOPLASTIC CHEMOTHERAPY: ICD-10-CM

## 2024-07-22 LAB
ALBUMIN SERPL ELPH-MCNC: 4.3 G/DL
ALP BLD-CCNC: 76 U/L
ALT SERPL-CCNC: 10 U/L
ANION GAP SERPL CALC-SCNC: 9 MMOL/L
AST SERPL-CCNC: 10 U/L
BILIRUB SERPL-MCNC: 0.3 MG/DL
BUN SERPL-MCNC: 27 MG/DL
CALCIUM SERPL-MCNC: 9.3 MG/DL
CHLORIDE SERPL-SCNC: 100 MMOL/L
CO2 SERPL-SCNC: 29 MMOL/L
CREAT SERPL-MCNC: 1.1 MG/DL
EGFR: 57 ML/MIN/1.73M2
GLUCOSE SERPL-MCNC: 127 MG/DL
HCT VFR BLD CALC: 32.4 %
HGB BLD-MCNC: 10.4 G/DL
MAGNESIUM SERPL-MCNC: 1.7 MG/DL
MCHC RBC-ENTMCNC: 26.6 PG
MCHC RBC-ENTMCNC: 32.1 G/DL
MCV RBC AUTO: 82.9 FL
PLATELET # BLD AUTO: 217 K/UL
PMV BLD: 10.3 FL
POTASSIUM SERPL-SCNC: 3.8 MMOL/L
PROT SERPL-MCNC: 6.7 G/DL
RBC # BLD: 3.91 M/UL
RBC # FLD: 14.1 %
SODIUM SERPL-SCNC: 138 MMOL/L
WBC # FLD AUTO: 6.69 K/UL

## 2024-07-22 PROCEDURE — 99215 OFFICE O/P EST HI 40 MIN: CPT

## 2024-07-22 PROCEDURE — 86304 IMMUNOASSAY TUMOR CA 125: CPT

## 2024-07-22 PROCEDURE — 85027 COMPLETE CBC AUTOMATED: CPT

## 2024-07-22 PROCEDURE — 80053 COMPREHEN METABOLIC PANEL: CPT

## 2024-07-22 PROCEDURE — 83735 ASSAY OF MAGNESIUM: CPT

## 2024-07-22 PROCEDURE — 96413 CHEMO IV INFUSION 1 HR: CPT

## 2024-07-22 PROCEDURE — G2211 COMPLEX E/M VISIT ADD ON: CPT | Mod: NC

## 2024-07-22 PROCEDURE — 36415 COLL VENOUS BLD VENIPUNCTURE: CPT

## 2024-07-22 RX ORDER — DOSTARLIMAB 50 MG/ML
500 INJECTION INTRAVENOUS ONCE
Refills: 0 | Status: COMPLETED | OUTPATIENT
Start: 2024-07-22 | End: 2024-07-22

## 2024-07-22 RX ADMIN — DOSTARLIMAB 500 MILLIGRAM(S): 50 INJECTION INTRAVENOUS at 14:35

## 2024-07-23 LAB — CANCER AG125 SERPL-ACNC: 4 U/ML

## 2024-07-23 RX ORDER — LIDOCAINE AND PRILOCAINE 25; 25 MG/G; MG/G
2.5-2.5 CREAM TOPICAL
Qty: 4 | Refills: 6 | Status: ACTIVE | COMMUNITY
Start: 2024-07-23 | End: 1900-01-01

## 2024-07-26 NOTE — ASSESSMENT
[FreeTextEntry1] : In summary, Ms. Patel is a 63 year old AA female with PMHx of HTN, DM type II, Rt breast mass (fibroadenoma confirmed on biopsy), Hx of fibroids with diagnosis of Stage IV Carcinosarcoma of the uterus.  # Stage IV Carcinosarcoma of the uterus # dMMR  and [id number]  - pT3a, Nx, M1 (biopsy proven omental implants) Biomarker Findings Microsatellite status - MS-Stable Tumor Mutational [place] - 4 Muts/Mb Genomic Findings For a complete list of the genes assayed, please refer to the Foundation report BRD4 amplification NOTCH3 amplification, NOTCH3-ZNF90 fusion TP53 Y234C  I had a detailed discussion with the pt regarding the natural history, treatment and prognosis of uterine cancer. There are multiple unfavorable histologic features incl carcinosarcoma histology, metastasis outside uterus and high grade in this case.  Patient tumor was found to be MMR deficient. In GODFREY trial, among the 118 patients with dMMR, MSI-H tumors, 24-month PFS was 61 percent with Dostarlimab versus 16 percent with placebo (HR 0.28, 95% CI, 0.16-0.50). OS at 24 months was 83 percent in the Dostarlimab group and 59 percent in the placebo group (HR 0.30, 95% CI 0.13-0.70). Only 8.9% of the patient population had carcinosarcoma.   She started on chemotherapy consisting of Carboplatin AUC5 and Taxol 175 mg/m2 with Dostarlimab 500 mg IV every three weeks on 11/15/23 x 6.  RECOMMENDATIONS: Previous notes reviewed and all relevant laboratory and radiology results discussed with and were communicated to the patient.  Continue(cycle # 13 today) Dostarlimab 500 mg IV every 3 weeks. CBC today reviewed and is adequate. Moderate anemia noted. Carboplatin and Taxol discontinued.   patient had repeat CT Scan of C/A/P with contrast, 7/24  STABLE FINDINGS *** Side effect profile of Dostarlimab were discussed with pt including but not limited to: anemia, fatigue, hyperglycemia, hyponatremia, hypoalbuminemia, itching, cough, nausea, rash, decreased appetite, hypertriglyceridemia, increased liver enzymes, hypocalcemia, constipation, diarrhea, upper respiratory tract infection and an immune-mediated reaction. Lab work will check for elevated liver enzymes and thyroid function.  The high risk of complications and complexity associated with antineoplastic therapy administration has been explained to the pt and family. Chemotherapy will be given with adequate precautions including premedications, hydration and close monitoring during treatment.  Chemotherapy will be administered under my direct supervision. -- SUPPORTIVE MEASURES discussed: Zofran 8 mg Q 8 hrs prn for emesis and nausea. Compazine Q 6 hrs prn. Encourage adequate fluid intake. GI prophylaxis with PPI. Probiotics -- Labs: CMP, . -- Port flush per protocol.  # anemia likely d/t chemo -- Stable, will continue to monitor.  #hypomagnesemia - Mg Sulfate to be given today IV with treatment   # Fibroadenoma of Right Breast - She was diagnosed with Biopsy 05/2022. - She is due for mammogram; referral provided last visit will request results    RTC in 3 weeks

## 2024-07-26 NOTE — PHYSICAL EXAM
[Restricted in physically strenuous activity but ambulatory and able to carry out work of a light or sedentary nature] : Status 1- Restricted in physically strenuous activity but ambulatory and able to carry out work of a light or sedentary nature, e.g., light house work, office work [Obese] : obese [Normal] : affect appropriate [de-identified] : Abdominal surgical incision healed, no tenderness noted. [de-identified] : Right chest port-A-cath intact, no erythema or tenderness noted. Alopecia

## 2024-07-26 NOTE — REVIEW OF SYSTEMS
[Diarrhea: Grade 0] : Diarrhea: Grade 0 [Negative] : Allergic/Immunologic [Fever] : no fever [Chills] : no chills [Chest Pain] : no chest pain [Palpitations] : no palpitations [Shortness Of Breath] : no shortness of breath [Wheezing] : no wheezing [Dysuria] : no dysuria [Incontinence] : no incontinence [Joint Pain] : no joint pain [Joint Stiffness] : no joint stiffness [FreeTextEntry7] : Abdominal surgical incision healed, no tenderness noted. [FreeTextEntry9] : Cold feet  [de-identified] : Abdominal surgical incision healed, no tenderness noted. Alopecia

## 2024-07-26 NOTE — CONSULT LETTER
[Dear  ___] : Dear  [unfilled], [Consult Letter:] : I had the pleasure of evaluating your patient, [unfilled]. [Please see my note below.] : Please see my note below. [Consult Closing:] : Thank you very much for allowing me to participate in the care of this patient.  If you have any questions, please do not hesitate to contact me. [Sincerely,] : Sincerely, [DrKevin  ___] : Dr. ANDREWS [FreeTextEntry3] : Tammy Henry MD  [facility] Attending Physician [facility] [phone]

## 2024-07-26 NOTE — CONSULT LETTER
[Dear  ___] : Dear  [unfilled], [Consult Letter:] : I had the pleasure of evaluating your patient, [unfilled]. [Please see my note below.] : Please see my note below. [Consult Closing:] : Thank you very much for allowing me to participate in the care of this patient.  If you have any questions, please do not hesitate to contact me. [Sincerely,] : Sincerely, [DrKevin  ___] : Dr. ANDREWS [FreeTextEntry3] : Tammy Henry MD  [facility] Attending Physician [facility] [phone]

## 2024-07-26 NOTE — HISTORY OF PRESENT ILLNESS
[Disease: _____________________] : Disease: [unfilled] [T: ___] : T[unfilled] [N: ___] : N[unfilled] [M: ___] : M[unfilled] [AJCC Stage: ____] : AJCC Stage: [unfilled] [de-identified] : Ms Patel is a 62 year old AA female with PMHx of HTN, DM type II, Rt breast mass (fibroadenoma confirmed on biopsy), Hx of fibroids who presents for the initial consultation of her Stage IV Carcinosarcoma of the uterus.  Patient reports that she had menopause at age of 56. Since then she had been having intermittent vaginal bleeding. Initially it was attributed to vaginal dryness as it was associated with coitus. Later she switched her GYN and she was told that she had fibroids that caused her vaginal bleeding. She changed her GYN again, and this time she had TVS that showed thickened endometrium and fibroids. She was taken for D&C in the August 2023, and the biopsy came back as carcinosarcoma of uterus. She was seen by Gyn-Onc (Dr. Patino) who took her for ( Ro converted to Exlap/MRH/BSO/omentectomy/tumor debulking on 10/16/23 for carcinosarcoma of the endometrium).  Today, its been two weeks since her surgery and she has been recovering well. She reports that she is eating well, weight is stable, bowel habits are normal and denies any vaginal bleed. She has a mid-abdominal scar that that is having some foul-smelling discharge.   Family Hx is not significant She lives with her daughter and mother. 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 [de-identified] : Carcinosarcoma [de-identified] : CA-125 74 [de-identified] : 12/5/23 Pt is s/p 1 cycle of chemo, (Taxol/carbo/dorsarlimab) tolerated well. Had some mild SE which were addressed.  12/26/23 Patient is here to follow up for endometrial cancer. S/P cycle #2 carbo/Taxol, tolerating well except for siffness of fingers post cycle #2 and cold feet. She feels well, denies abdominal pain, nausea, vomiting or abnormal vaginal bleeding or discharge, no fever or chills.  1/16/24 Patient is here to follow up for endometrial cancer. S/P cycle #3 carbo/Taxol and Dostarlimab, tolerating well except for cold feet. She feels well, denies abdominal pain, nausea, vomiting or abnormal vaginal bleeding or discharge, no fever or chills. 2/6/24 Pt is here for follow up. Feels well. Tolerating chemo well. No abd pain, Nausea, vomiting or diarrhea  2/27/24: Pt is here for follow up She endorses peripheral neuropathy but other wise is tolerating chemo well No abd pain, Nausea, vomiting or diarrhea She is scheduled for cycle #6  3/19/24 Pt is here for follow up. Feels well. has mild diarrhea off and on. No abd pain, N, V No vag bleeding.  4/10/24: Pt is here for follow up She endorses peripheral neuropathy but other wise is tolerating immunotherapy  well No abdominal pain , Nausea, vomiting or diarrhea She is scheduled for cycle #8  5/1/2024:  Pt is here for follow up She still complaining of peripheral neuropathy, specifically cold sensation of b/l lower extremities. Also endorses on and off loose stools.  No abdominal pain , Nausea, vomiting. She is scheduled for cycle #9 today.  Bllod work reviewed.  All qsns answered.   5/21/24 Pt is here for follow up. Her neuropathy has improved on Gabapentin. No abdominal pain, Nausea, vomiting. She is scheduled for cycle #10 of Dostarlimab today.  CT findings discussed, will continue to follow up with imaging.  06/10/2024 Pt is here for follow up. Her neuropathy has improved on Gabapentin. No abdominal pain, Nausea, vomiting. She is scheduled for cycle #11 of Dostarlimab today.  7/1/24 pt is here for follow up. Feels well. Has occasional cough ? allergies Tolerating treatment well. No abd pain, N, V, SOB 7/22/24 Pt is here for follow up. had Ct scan done and she developed nausea and vomiting. no abd pain and vag bleeding

## 2024-07-26 NOTE — PHYSICAL EXAM
[Restricted in physically strenuous activity but ambulatory and able to carry out work of a light or sedentary nature] : Status 1- Restricted in physically strenuous activity but ambulatory and able to carry out work of a light or sedentary nature, e.g., light house work, office work [Obese] : obese [Normal] : affect appropriate [de-identified] : Abdominal surgical incision healed, no tenderness noted. [de-identified] : Right chest port-A-cath intact, no erythema or tenderness noted. Alopecia

## 2024-07-26 NOTE — REVIEW OF SYSTEMS
[Diarrhea: Grade 0] : Diarrhea: Grade 0 [Negative] : Allergic/Immunologic [Fever] : no fever [Chills] : no chills [Chest Pain] : no chest pain [Palpitations] : no palpitations [Shortness Of Breath] : no shortness of breath [Wheezing] : no wheezing [Dysuria] : no dysuria [Incontinence] : no incontinence [Joint Pain] : no joint pain [Joint Stiffness] : no joint stiffness [FreeTextEntry7] : Abdominal surgical incision healed, no tenderness noted. [FreeTextEntry9] : Cold feet  [de-identified] : Abdominal surgical incision healed, no tenderness noted. Alopecia

## 2024-07-26 NOTE — ASSESSMENT
Patient has been accepted to [address] [place] [address] and can discharge home when medically stable. Patient debbie Rascon requested for patient to be transported home via ambulance. [FreeTextEntry1] : In summary, Ms. Patel is a 63 year old AA female with PMHx of HTN, DM type II, Rt breast mass (fibroadenoma confirmed on biopsy), Hx of fibroids with diagnosis of Stage IV Carcinosarcoma of the uterus.  # Stage IV Carcinosarcoma of the uterus # dMMR  and [id number]  - pT3a, Nx, M1 (biopsy proven omental implants) Biomarker Findings Microsatellite status - MS-Stable Tumor Mutational [place] - 4 Muts/Mb Genomic Findings For a complete list of the genes assayed, please refer to the Foundation report BRD4 amplification NOTCH3 amplification, NOTCH3-ZNF90 fusion TP53 Y234C  I had a detailed discussion with the pt regarding the natural history, treatment and prognosis of uterine cancer. There are multiple unfavorable histologic features incl carcinosarcoma histology, metastasis outside uterus and high grade in this case.  Patient tumor was found to be MMR deficient. In GODFREY trial, among the 118 patients with dMMR, MSI-H tumors, 24-month PFS was 61 percent with Dostarlimab versus 16 percent with placebo (HR 0.28, 95% CI, 0.16-0.50). OS at 24 months was 83 percent in the Dostarlimab group and 59 percent in the placebo group (HR 0.30, 95% CI 0.13-0.70). Only 8.9% of the patient population had carcinosarcoma.   She started on chemotherapy consisting of Carboplatin AUC5 and Taxol 175 mg/m2 with Dostarlimab 500 mg IV every three weeks on 11/15/23 x 6.  RECOMMENDATIONS: Previous notes reviewed and all relevant laboratory and radiology results discussed with and were communicated to the patient.  Continue(cycle # 13 today) Dostarlimab 500 mg IV every 3 weeks. CBC today reviewed and is adequate. Moderate anemia noted. Carboplatin and Taxol discontinued.   patient had repeat CT Scan of C/A/P with contrast, 7/24  STABLE FINDINGS *** Side effect profile of Dostarlimab were discussed with pt including but not limited to: anemia, fatigue, hyperglycemia, hyponatremia, hypoalbuminemia, itching, cough, nausea, rash, decreased appetite, hypertriglyceridemia, increased liver enzymes, hypocalcemia, constipation, diarrhea, upper respiratory tract infection and an immune-mediated reaction. Lab work will check for elevated liver enzymes and thyroid function.  The high risk of complications and complexity associated with antineoplastic therapy administration has been explained to the pt and family. Chemotherapy will be given with adequate precautions including premedications, hydration and close monitoring during treatment.  Chemotherapy will be administered under my direct supervision. -- SUPPORTIVE MEASURES discussed: Zofran 8 mg Q 8 hrs prn for emesis and nausea. Compazine Q 6 hrs prn. Encourage adequate fluid intake. GI prophylaxis with PPI. Probiotics -- Labs: CMP, . -- Port flush per protocol.  # anemia likely d/t chemo -- Stable, will continue to monitor.  #hypomagnesemia - Mg Sulfate to be given today IV with treatment   # Fibroadenoma of Right Breast - She was diagnosed with Biopsy 05/2022. - She is due for mammogram; referral provided last visit will request results    RTC in 3 weeks

## 2024-07-26 NOTE — HISTORY OF PRESENT ILLNESS
[Disease: _____________________] : Disease: [unfilled] [T: ___] : T[unfilled] [N: ___] : N[unfilled] [M: ___] : M[unfilled] [AJCC Stage: ____] : AJCC Stage: [unfilled] [de-identified] : Ms Patel is a 62 year old AA female with PMHx of HTN, DM type II, Rt breast mass (fibroadenoma confirmed on biopsy), Hx of fibroids who presents for the initial consultation of her Stage IV Carcinosarcoma of the uterus.  Patient reports that she had menopause at age of 56. Since then she had been having intermittent vaginal bleeding. Initially it was attributed to vaginal dryness as it was associated with coitus. Later she switched her GYN and she was told that she had fibroids that caused her vaginal bleeding. She changed her GYN again, and this time she had TVS that showed thickened endometrium and fibroids. She was taken for D&C in the August 2023, and the biopsy came back as carcinosarcoma of uterus. She was seen by Gyn-Onc (Dr. Patino) who took her for ( Ro converted to Exlap/MRH/BSO/omentectomy/tumor debulking on 10/16/23 for carcinosarcoma of the endometrium).  Today, its been two weeks since her surgery and she has been recovering well. She reports that she is eating well, weight is stable, bowel habits are normal and denies any vaginal bleed. She has a mid-abdominal scar that that is having some foul-smelling discharge.   Family Hx is not significant She lives with her daughter and mother. 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 [de-identified] : Carcinosarcoma [de-identified] : CA-125 74 [de-identified] : 12/5/23 Pt is s/p 1 cycle of chemo, (Taxol/carbo/dorsarlimab) tolerated well. Had some mild SE which were addressed.  12/26/23 Patient is here to follow up for endometrial cancer. S/P cycle #2 carbo/Taxol, tolerating well except for siffness of fingers post cycle #2 and cold feet. She feels well, denies abdominal pain, nausea, vomiting or abnormal vaginal bleeding or discharge, no fever or chills.  1/16/24 Patient is here to follow up for endometrial cancer. S/P cycle #3 carbo/Taxol and Dostarlimab, tolerating well except for cold feet. She feels well, denies abdominal pain, nausea, vomiting or abnormal vaginal bleeding or discharge, no fever or chills. 2/6/24 Pt is here for follow up. Feels well. Tolerating chemo well. No abd pain, Nausea, vomiting or diarrhea  2/27/24: Pt is here for follow up She endorses peripheral neuropathy but other wise is tolerating chemo well No abd pain, Nausea, vomiting or diarrhea She is scheduled for cycle #6  3/19/24 Pt is here for follow up. Feels well. has mild diarrhea off and on. No abd pain, N, V No vag bleeding.  4/10/24: Pt is here for follow up She endorses peripheral neuropathy but other wise is tolerating immunotherapy  well No abdominal pain , Nausea, vomiting or diarrhea She is scheduled for cycle #8  5/1/2024:  Pt is here for follow up She still complaining of peripheral neuropathy, specifically cold sensation of b/l lower extremities. Also endorses on and off loose stools.  No abdominal pain , Nausea, vomiting. She is scheduled for cycle #9 today.  Bllod work reviewed.  All qsns answered.   5/21/24 Pt is here for follow up. Her neuropathy has improved on Gabapentin. No abdominal pain, Nausea, vomiting. She is scheduled for cycle #10 of Dostarlimab today.  CT findings discussed, will continue to follow up with imaging.  06/10/2024 Pt is here for follow up. Her neuropathy has improved on Gabapentin. No abdominal pain, Nausea, vomiting. She is scheduled for cycle #11 of Dostarlimab today.  7/1/24 pt is here for follow up. Feels well. Has occasional cough ? allergies Tolerating treatment well. No abd pain, N, V, SOB 7/22/24 Pt is here for follow up. had Ct scan done and she developed nausea and vomiting. no abd pain and vag bleeding

## 2024-08-02 ENCOUNTER — RX RENEWAL (OUTPATIENT)
Age: 64
End: 2024-08-02

## 2024-08-12 ENCOUNTER — APPOINTMENT (OUTPATIENT)
Age: 64
End: 2024-08-12

## 2024-08-12 ENCOUNTER — LABORATORY RESULT (OUTPATIENT)
Age: 64
End: 2024-08-12

## 2024-08-12 ENCOUNTER — OUTPATIENT (OUTPATIENT)
Dept: OUTPATIENT SERVICES | Facility: HOSPITAL | Age: 64
LOS: 1 days | End: 2024-08-12
Payer: COMMERCIAL

## 2024-08-12 VITALS
WEIGHT: 233 LBS | DIASTOLIC BLOOD PRESSURE: 93 MMHG | HEART RATE: 94 BPM | RESPIRATION RATE: 17 BRPM | HEIGHT: 64 IN | BODY MASS INDEX: 39.78 KG/M2 | OXYGEN SATURATION: 97 % | SYSTOLIC BLOOD PRESSURE: 129 MMHG | TEMPERATURE: 97.9 F

## 2024-08-12 DIAGNOSIS — Z90.49 ACQUIRED ABSENCE OF OTHER SPECIFIED PARTS OF DIGESTIVE TRACT: Chronic | ICD-10-CM

## 2024-08-12 DIAGNOSIS — Z98.890 OTHER SPECIFIED POSTPROCEDURAL STATES: Chronic | ICD-10-CM

## 2024-08-12 DIAGNOSIS — Z29.89 ENCOUNTER. FOR OTHER SPECIFIED PROPHYLACTIC MEASURES: ICD-10-CM

## 2024-08-12 DIAGNOSIS — C54.1 MALIGNANT NEOPLASM OF ENDOMETRIUM: ICD-10-CM

## 2024-08-12 PROCEDURE — 99213 OFFICE O/P EST LOW 20 MIN: CPT

## 2024-08-12 PROCEDURE — 86304 IMMUNOASSAY TUMOR CA 125: CPT

## 2024-08-12 PROCEDURE — 84443 ASSAY THYROID STIM HORMONE: CPT

## 2024-08-12 PROCEDURE — 85027 COMPLETE CBC AUTOMATED: CPT

## 2024-08-12 PROCEDURE — 83735 ASSAY OF MAGNESIUM: CPT

## 2024-08-12 PROCEDURE — 80053 COMPREHEN METABOLIC PANEL: CPT

## 2024-08-12 PROCEDURE — 84439 ASSAY OF FREE THYROXINE: CPT

## 2024-08-12 PROCEDURE — 96413 CHEMO IV INFUSION 1 HR: CPT

## 2024-08-12 RX ORDER — DOSTARLIMAB 50 MG/ML
500 INJECTION INTRAVENOUS ONCE
Refills: 0 | Status: COMPLETED | OUTPATIENT
Start: 2024-08-12 | End: 2024-08-12

## 2024-08-12 RX ADMIN — DOSTARLIMAB 500 MILLIGRAM(S): 50 INJECTION INTRAVENOUS at 15:00

## 2024-08-13 DIAGNOSIS — C54.1 MALIGNANT NEOPLASM OF ENDOMETRIUM: ICD-10-CM

## 2024-08-14 LAB
ALBUMIN SERPL ELPH-MCNC: 4.5 G/DL
ALP BLD-CCNC: 83 U/L
ALT SERPL-CCNC: 10 U/L
ANION GAP SERPL CALC-SCNC: 12 MMOL/L
AST SERPL-CCNC: 10 U/L
BILIRUB SERPL-MCNC: 0.3 MG/DL
BUN SERPL-MCNC: 24 MG/DL
CALCIUM SERPL-MCNC: 10 MG/DL
CANCER AG125 SERPL-ACNC: 4 U/ML
CHLORIDE SERPL-SCNC: 104 MMOL/L
CO2 SERPL-SCNC: 29 MMOL/L
CREAT SERPL-MCNC: 1.2 MG/DL
EGFR: 51 ML/MIN/1.73M2
GLUCOSE SERPL-MCNC: 100 MG/DL
HCT VFR BLD CALC: 34.2 %
HGB BLD-MCNC: 11.1 G/DL
MAGNESIUM SERPL-MCNC: 1.9 MG/DL
MCHC RBC-ENTMCNC: 26 PG
MCHC RBC-ENTMCNC: 32.5 G/DL
MCV RBC AUTO: 80.1 FL
PLATELET # BLD AUTO: 236 K/UL
PMV BLD: 10.3 FL
POTASSIUM SERPL-SCNC: 4.5 MMOL/L
PROT SERPL-MCNC: 7.2 G/DL
RBC # BLD: 4.27 M/UL
RBC # FLD: 14.3 %
SODIUM SERPL-SCNC: 145 MMOL/L
T4 FREE SERPL-MCNC: 1.2 NG/DL
TSH SERPL-ACNC: 0.88 UIU/ML
WBC # FLD AUTO: 6.32 K/UL

## 2024-08-23 NOTE — ASSESSMENT
[FreeTextEntry1] : In summary, Ms. Patel is a 63 year old AA female with PMHx of HTN, DM type II, Rt breast mass (fibroadenoma confirmed on biopsy), Hx of fibroids with diagnosis of Stage IV Carcinosarcoma of the uterus.  # Stage IV Carcinosarcoma of the uterus # dMMR and [id number] - pT3a, Nx, M1 (biopsy proven omental implants) Biomarker Findings Microsatellite status - MS-Stable Tumor Mutational [place] - 4 Muts/Mb Genomic Findings For a complete list of the genes assayed, please refer to the Foundation report BRD4 amplification NOTCH3 amplification, NOTCH3-ZNF90 fusion TP53 Y234C  I had a detailed discussion with the pt regarding the natural history, treatment and prognosis of uterine cancer. There are multiple unfavorable histologic features incl carcinosarcoma histology, metastasis outside uterus and high grade in this case.  Patient tumor was found to be MMR deficient. In GODFREY trial, among the 118 patients with dMMR, MSI-H tumors, 24-month PFS was 61 percent with Dostarlimab versus 16 percent with placebo (HR 0.28, 95% CI, 0.16-0.50). OS at 24 months was 83 percent in the Dostarlimab group and 59 percent in the placebo group (HR 0.30, 95% CI 0.13-0.70). Only 8.9% of the patient population had carcinosarcoma.  She started on chemotherapy consisting of Carboplatin AUC5 and Taxol 175 mg/m2 with Dostarlimab 500 mg IV every three weeks on 11/15/23 x 6.  RECOMMENDATIONS: Previous notes reviewed and all relevant laboratory and radiology results discussed with and were communicated to the patient.  Continue(cycle # 14 today) Dostarlimab 500 mg IV every 3 weeks. CBC today reviewed and is adequate.  Carboplatin and Taxol discontinued.  Patient had repeat CT Scan of C/A/P with contrast, 7/24 STABLE FINDINGS *** Side effect profile of Dostarlimab were discussed with pt including but not limited to: anemia, fatigue, hyperglycemia, hyponatremia, hypoalbuminemia, itching, cough, nausea, rash, decreased appetite, hypertriglyceridemia, increased liver enzymes, hypocalcemia, constipation, diarrhea, upper respiratory tract infection and an immune-mediated reaction. Lab work will check for elevated liver enzymes and thyroid function.  The high risk of complications and complexity associated with antineoplastic therapy administration has been explained to the pt and family. Chemotherapy will be given with adequate precautions including premedications, hydration and close monitoring during treatment.  Chemotherapy will be administered under my direct supervision. -- SUPPORTIVE MEASURES discussed: Zofran 8 mg Q 8 hrs prn for emesis and nausea. Compazine Q 6 hrs prn. Encourage adequate fluid intake. GI prophylaxis with PPI. Probiotics -- Labs: CMP, . -- Port flush per protocol.  # anemia likely d/t chemo -- Stable, will continue to monitor.  #hypomagnesemia - recheck magnesium level with blood work today   # Fibroadenoma of Right Breast - She was diagnosed with Biopsy 05/2022. - Mammogram report reviewed BIRADS 2   RTC in 3 weeks D1 of next cycle

## 2024-08-23 NOTE — HISTORY OF PRESENT ILLNESS
[de-identified] : Ms Patel is a 62 year old AA female with PMHx of HTN, DM type II, Rt breast mass (fibroadenoma confirmed on biopsy), Hx of fibroids who presents for the initial consultation of her Stage IV Carcinosarcoma of the uterus.  Patient reports that she had menopause at age of 56. Since then she had been having intermittent vaginal bleeding. Initially it was attributed to vaginal dryness as it was associated with coitus. Later she switched her GYN and she was told that she had fibroids that caused her vaginal bleeding. She changed her GYN again, and this time she had TVS that showed thickened endometrium and fibroids. She was taken for D&C in the August 2023, and the biopsy came back as carcinosarcoma of uterus. She was seen by Gyn-Onc (Dr. Patino) who took her for ( Ro converted to Exlap/MRH/BSO/omentectomy/tumor debulking on 10/16/23 for carcinosarcoma of the endometrium).  Today, its been two weeks since her surgery and she has been recovering well. She reports that she is eating well, weight is stable, bowel habits are normal and denies any vaginal bleed. She has a mid-abdominal scar that that is having some foul-smelling discharge.  Family Hx is not significant She lives with her daughter and mother. 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   Disease: Uterus   Pathology: Carcinosarcoma   TNM stage: T3A, Nx, M1 AJCC Stage: IV   Tumor Markers: CA-125 74     Interval History: 12/5/23 Pt is s/p 1 cycle of chemo, (Taxol/carbo/dorsarlimab) tolerated well. Had some mild SE which were addressed.  12/26/23 Patient is here to follow up for endometrial cancer. S/P cycle #2 carbo/Taxol, tolerating well except for siffness of fingers post cycle #2 and cold feet. She feels well, denies abdominal pain, nausea, vomiting or abnormal vaginal bleeding or discharge, no fever or chills.  1/16/24 Patient is here to follow up for endometrial cancer. S/P cycle #3 carbo/Taxol and Dostarlimab, tolerating well except for cold feet. She feels well, denies abdominal pain, nausea, vomiting or abnormal vaginal bleeding or discharge, no fever or chills. 2/6/24 Pt is here for follow up. Feels well. Tolerating chemo well. No abd pain, Nausea, vomiting or diarrhea  2/27/24: Pt is here for follow up She endorses peripheral neuropathy but other wise is tolerating chemo well No abd pain, Nausea, vomiting or diarrhea She is scheduled for cycle #6 3/19/24 Pt is here for follow up. Feels well. has mild diarrhea off and on. No abd pain, N, V No vag bleeding.  4/10/24: Pt is here for follow up She endorses peripheral neuropathy but other wise is tolerating immunotherapy well No abdominal pain , Nausea, vomiting or diarrhea She is scheduled for cycle #8  5/1/2024: Pt is here for follow up She still complaining of peripheral neuropathy, specifically cold sensation of b/l lower extremities. Also endorses on and off loose stools. No abdominal pain , Nausea, vomiting. She is scheduled for cycle #9 today. Rolanlod work reviewed. All qsns answered.  5/21/24 Pt is here for follow up. Her neuropathy has improved on Gabapentin. No abdominal pain, Nausea, vomiting. She is scheduled for cycle #10 of Dostarlimab today. CT findings discussed, will continue to follow up with imaging.  06/10/2024 Pt is here for follow up. Her neuropathy has improved on Gabapentin. No abdominal pain, Nausea, vomiting. She is scheduled for cycle #11 of Dostarlimab today. 7/1/24 pt is here for follow up. Feels well. Has occasional cough ? allergies Tolerating treatment well. No abd pain, N, V, SOB  7/22/24 Pt is here for follow up. had Ct scan done and she developed nausea and vomiting. no abd pain and vag bleeding.  8/12/24 Patient is here for follow up. No complaints to offer at this time, tolerating dostarlimab Maintenace well.

## 2024-08-23 NOTE — ASSESSMENT
[FreeTextEntry1] : In summary, Ms. Patel is a 63 year old AA female with PMHx of HTN, DM type II, Rt breast mass (fibroadenoma confirmed on biopsy), Hx of fibroids with diagnosis of Stage IV Carcinosarcoma of the uterus.  # Stage IV Carcinosarcoma of the uterus # dMMR and [id number] - pT3a, Nx, M1 (biopsy proven omental implants) Biomarker Findings Microsatellite status - MS-Stable Tumor Mutational [place] - 4 Muts/Mb Genomic Findings For a complete list of the genes assayed, please refer to the Foundation report BRD4 amplification NOTCH3 amplification, NOTCH3-ZNF90 fusion TP53 Y234C  I had a detailed discussion with the pt regarding the natural history, treatment and prognosis of uterine cancer. There are multiple unfavorable histologic features incl carcinosarcoma histology, metastasis outside uterus and high grade in this case.  Patient tumor was found to be MMR deficient. In GOD[facility]REY trial, among the 118 patients with dMMR, MSI-H tumors, 24-month PFS was 61 percent with Dostarlimab versus 16 percent with placebo (HR 0.28, 95% CI, 0.16-0.50). OS at 24 months was 83 percent in the Dostarlimab group and 59 percent in the placebo group (HR 0.30, 95% CI 0.13-0.70). Only 8.9% of the patient population had carcinosarcoma.  She started on chemotherapy consisting of Carboplatin AUC5 and Taxol 175 mg/m2 with Dostarlimab 500 mg IV every three weeks on 11/15/23 x 6.  RECOMMENDATIONS: Previous notes reviewed and all relevant laboratory and radiology results discussed with and were communicated to the patient.  Continue(cycle # 14 today) Dostarlimab 500 mg IV every 3 weeks. CBC today reviewed and is adequate.  Carboplatin and Taxol discontinued.  Patient had repeat CT Scan of C/A/P with contrast, 7/24 STABLE FINDINGS *** Side effect profile of Dostarlimab were discussed with pt including but not limited to: anemia, fatigue, hyperglycemia, hyponatremia, hypoalbuminemia, itching, cough, nausea, rash, decreased appetite, hypertriglyceridemia, increased liver enzymes, hypocalcemia, constipation, diarrhea, upper respiratory tract infection and an immune-mediated reaction. Lab work will check for elevated liver enzymes and thyroid function.  The high risk of complications and complexity associated with antineoplastic therapy administration has been explained to the pt and family. Chemotherapy will be given with adequate precautions including premedications, hydration and close monitoring during treatment.  Chemotherapy will be administered under my direct supervision. -- SUPPORTIVE MEASURES discussed: Zofran 8 mg Q 8 hrs prn for emesis and nausea. Compazine Q 6 hrs prn. Encourage adequate fluid intake. GI prophylaxis with PPI. Probiotics -- Labs: CMP, . -- Port flush per protocol.  # anemia likely d/t chemo -- Stable, will continue to monitor.  #hypomagnesemia - recheck magnesium level with blood work today   # Fibroadenoma of Right Breast - She was diagnosed with Biopsy 05/2022. - Mammogram report reviewed BIRADS 2   RTC in 3 weeks D1 of next cycle

## 2024-08-23 NOTE — HISTORY OF PRESENT ILLNESS
[de-identified] : Ms Patel is a 62 year old AA female with PMHx of HTN, DM type II, Rt breast mass (fibroadenoma confirmed on biopsy), Hx of fibroids who presents for the initial consultation of her Stage IV Carcinosarcoma of the uterus.  Patient reports that she had menopause at age of 56. Since then she had been having intermittent vaginal bleeding. Initially it was attributed to vaginal dryness as it was associated with coitus. Later she switched her GYN and she was told that she had fibroids that caused her vaginal bleeding. She changed her GYN again, and this time she had TVS that showed thickened endometrium and fibroids. She was taken for D&C in the August 2023, and the biopsy came back as carcinosarcoma of uterus. She was seen by Gyn-Onc (Dr. Patino) who took her for ( Ro converted to Exlap/MRH/BSO/omentectomy/tumor debulking on 10/16/23 for carcinosarcoma of the endometrium).  Today, its been two weeks since her surgery and she has been recovering well. She reports that she is eating well, weight is stable, bowel habits are normal and denies any vaginal bleed. She has a mid-abdominal scar that that is having some foul-smelling discharge.  Family Hx is not significant She lives with her daughter and mother. 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   Disease: Uterus   Pathology: Carcinosarcoma   TNM stage: T3A, Nx, M1 AJCC Stage: IV   Tumor Markers: CA-125 74     Interval History: 12/5/23 Pt is s/p 1 cycle of chemo, (Taxol/carbo/dorsarlimab) tolerated well. Had some mild SE which were addressed.  12/26/23 Patient is here to follow up for endometrial cancer. S/P cycle #2 carbo/Taxol, tolerating well except for siffness of fingers post cycle #2 and cold feet. She feels well, denies abdominal pain, nausea, vomiting or abnormal vaginal bleeding or discharge, no fever or chills.  1/16/24 Patient is here to follow up for endometrial cancer. S/P cycle #3 carbo/Taxol and Dostarlimab, tolerating well except for cold feet. She feels well, denies abdominal pain, nausea, vomiting or abnormal vaginal bleeding or discharge, no fever or chills. 2/6/24 Pt is here for follow up. Feels well. Tolerating chemo well. No abd pain, Nausea, vomiting or diarrhea  2/27/24: Pt is here for follow up She endorses peripheral neuropathy but other wise is tolerating chemo well No abd pain, Nausea, vomiting or diarrhea She is scheduled for cycle #6 3/19/24 Pt is here for follow up. Feels well. has mild diarrhea off and on. No abd pain, N, V No vag bleeding.  4/10/24: Pt is here for follow up She endorses peripheral neuropathy but other wise is tolerating immunotherapy well No abdominal pain , Nausea, vomiting or diarrhea She is scheduled for cycle #8  5/1/2024: Pt is here for follow up She still complaining of peripheral neuropathy, specifically cold sensation of b/l lower extremities. Also endorses on and off loose stools. No abdominal pain , Nausea, vomiting. She is scheduled for cycle #9 today. Rolanlod work reviewed. All qsns answered.  5/21/24 Pt is here for follow up. Her neuropathy has improved on Gabapentin. No abdominal pain, Nausea, vomiting. She is scheduled for cycle #10 of Dostarlimab today. CT findings discussed, will continue to follow up with imaging.  06/10/2024 Pt is here for follow up. Her neuropathy has improved on Gabapentin. No abdominal pain, Nausea, vomiting. She is scheduled for cycle #11 of Dostarlimab today. 7/1/24 pt is here for follow up. Feels well. Has occasional cough ? allergies Tolerating treatment well. No abd pain, N, V, SOB  7/22/24 Pt is here for follow up. had Ct scan done and she developed nausea and vomiting. no abd pain and vag bleeding.  8/12/24 Patient is here for follow up. No complaints to offer at this time, tolerating dostarlimab Maintenace well.

## 2024-09-03 ENCOUNTER — OUTPATIENT (OUTPATIENT)
Dept: OUTPATIENT SERVICES | Facility: HOSPITAL | Age: 64
LOS: 1 days | End: 2024-09-03
Payer: COMMERCIAL

## 2024-09-03 ENCOUNTER — APPOINTMENT (OUTPATIENT)
Age: 64
End: 2024-09-03
Payer: COMMERCIAL

## 2024-09-03 ENCOUNTER — LABORATORY RESULT (OUTPATIENT)
Age: 64
End: 2024-09-03

## 2024-09-03 VITALS
TEMPERATURE: 98.4 F | BODY MASS INDEX: 40.46 KG/M2 | SYSTOLIC BLOOD PRESSURE: 137 MMHG | DIASTOLIC BLOOD PRESSURE: 76 MMHG | OXYGEN SATURATION: 98 % | RESPIRATION RATE: 17 BRPM | HEART RATE: 83 BPM | HEIGHT: 64 IN | WEIGHT: 237 LBS

## 2024-09-03 DIAGNOSIS — Z51.11 ENCOUNTER FOR ANTINEOPLASTIC CHEMOTHERAPY: ICD-10-CM

## 2024-09-03 DIAGNOSIS — Z90.49 ACQUIRED ABSENCE OF OTHER SPECIFIED PARTS OF DIGESTIVE TRACT: Chronic | ICD-10-CM

## 2024-09-03 DIAGNOSIS — Z98.890 OTHER SPECIFIED POSTPROCEDURAL STATES: Chronic | ICD-10-CM

## 2024-09-03 DIAGNOSIS — C54.1 MALIGNANT NEOPLASM OF ENDOMETRIUM: ICD-10-CM

## 2024-09-03 DIAGNOSIS — G62.9 POLYNEUROPATHY, UNSPECIFIED: ICD-10-CM

## 2024-09-03 LAB
ALBUMIN SERPL ELPH-MCNC: 4.3 G/DL
ALP BLD-CCNC: 83 U/L
ALT SERPL-CCNC: 11 U/L
ANION GAP SERPL CALC-SCNC: 11 MMOL/L
AST SERPL-CCNC: 10 U/L
BILIRUB SERPL-MCNC: 0.3 MG/DL
BUN SERPL-MCNC: 24 MG/DL
CALCIUM SERPL-MCNC: 9.5 MG/DL
CHLORIDE SERPL-SCNC: 101 MMOL/L
CO2 SERPL-SCNC: 29 MMOL/L
CREAT SERPL-MCNC: 1 MG/DL
EGFR: 64 ML/MIN/1.73M2
GLUCOSE SERPL-MCNC: 179 MG/DL
HCT VFR BLD CALC: 33.9 %
HGB BLD-MCNC: 10.7 G/DL
MCHC RBC-ENTMCNC: 25.9 PG
MCHC RBC-ENTMCNC: 31.6 G/DL
MCV RBC AUTO: 82.1 FL
PLATELET # BLD AUTO: 224 K/UL
PMV BLD: 10 FL
POTASSIUM SERPL-SCNC: 4.1 MMOL/L
PROT SERPL-MCNC: 6.6 G/DL
RBC # BLD: 4.13 M/UL
RBC # FLD: 14.6 %
SODIUM SERPL-SCNC: 141 MMOL/L
T4 FREE SERPL-MCNC: 1.1 NG/DL
TSH SERPL-ACNC: 1.43 UIU/ML
WBC # FLD AUTO: 7.28 K/UL

## 2024-09-03 PROCEDURE — 86304 IMMUNOASSAY TUMOR CA 125: CPT

## 2024-09-03 PROCEDURE — G2211 COMPLEX E/M VISIT ADD ON: CPT | Mod: NC

## 2024-09-03 PROCEDURE — 80053 COMPREHEN METABOLIC PANEL: CPT

## 2024-09-03 PROCEDURE — 99215 OFFICE O/P EST HI 40 MIN: CPT

## 2024-09-03 PROCEDURE — 84439 ASSAY OF FREE THYROXINE: CPT

## 2024-09-03 PROCEDURE — 84443 ASSAY THYROID STIM HORMONE: CPT

## 2024-09-03 PROCEDURE — 85027 COMPLETE CBC AUTOMATED: CPT

## 2024-09-03 PROCEDURE — 96413 CHEMO IV INFUSION 1 HR: CPT

## 2024-09-03 RX ORDER — LIDOCAINE AND PRILOCAINE 25; 25 MG/G; MG/G
2.5-2.5 CREAM TOPICAL
Qty: 30 | Refills: 4 | Status: ACTIVE | COMMUNITY
Start: 2024-09-03 | End: 1900-01-01

## 2024-09-03 RX ORDER — DOSTARLIMAB 50 MG/ML
500 INJECTION INTRAVENOUS ONCE
Refills: 0 | Status: COMPLETED | OUTPATIENT
Start: 2024-09-03 | End: 2024-09-03

## 2024-09-03 RX ADMIN — DOSTARLIMAB 500 MILLIGRAM(S): 50 INJECTION INTRAVENOUS at 14:27

## 2024-09-04 DIAGNOSIS — C54.1 MALIGNANT NEOPLASM OF ENDOMETRIUM: ICD-10-CM

## 2024-09-04 LAB — CANCER AG125 SERPL-ACNC: 4 U/ML

## 2024-09-06 ENCOUNTER — APPOINTMENT (OUTPATIENT)
Dept: GYNECOLOGIC ONCOLOGY | Facility: CLINIC | Age: 64
End: 2024-09-06
Payer: COMMERCIAL

## 2024-09-06 DIAGNOSIS — C54.1 MALIGNANT NEOPLASM OF ENDOMETRIUM: ICD-10-CM

## 2024-09-06 PROCEDURE — 99214 OFFICE O/P EST MOD 30 MIN: CPT

## 2024-09-06 NOTE — ASSESSMENT
[FreeTextEntry1] : In summary, Ms. Patel is a 63 year old AA female with PMHx of HTN, DM type II, Rt breast mass (fibroadenoma confirmed on biopsy), Hx of fibroids with diagnosis of Stage IV Carcinosarcoma of the uterus.  # Stage IV Carcinosarcoma of the uterus # dMMR and [id number] - pT3a, Nx, M1 (biopsy proven omental implants) Biomarker Findings Microsatellite status - MS-Stable Tumor Mutational [place] - 4 Muts/Mb Genomic Findings For a complete list of the genes assayed, please refer to the Foundation report BRD4 amplification NOTCH3 amplification, NOTCH3-ZNF90 fusion TP53 Y234C  I had a detailed discussion with the pt regarding the natural history, treatment and prognosis of uterine cancer. There are multiple unfavorable histologic features incl carcinosarcoma histology, metastasis outside uterus and high grade in this case.  Patient tumor was found to be MMR deficient. In GODFREY trial, among the 118 patients with dMMR, MSI-H tumors, 24-month PFS was 61 percent with Dostarlimab versus 16 percent with placebo (HR 0.28, 95% CI, 0.16-0.50). OS at 24 months was 83 percent in the Dostarlimab group and 59 percent in the placebo group (HR 0.30, 95% CI 0.13-0.70). Only 8.9% of the patient population had carcinosarcoma.  She started on chemotherapy consisting of Carboplatin AUC5 and Taxol 175 mg/m2 with Dostarlimab 500 mg IV every three weeks on 11/15/23 x 6.  RECOMMENDATIONS: Previous notes reviewed and all relevant laboratory and radiology results discussed with and were communicated to the patient.  Continue(cycle # 15 today) Dostarlimab 500 mg IV every 3 weeks. CBC today reviewed and is adequate.  Carboplatin and Taxol discontinued.  Patient had repeat CT Scan of C/A/P with contrast, 7/24 STABLE FINDINGS *** Side effect profile of Dostarlimab were discussed with pt including but not limited to: anemia, fatigue, hyperglycemia, hyponatremia, hypoalbuminemia, itching, cough, nausea, rash, decreased appetite, hypertriglyceridemia, increased liver enzymes, hypocalcemia, constipation, diarrhea, upper respiratory tract infection and an immune-mediated reaction. Lab work will check for elevated liver enzymes and thyroid function.  The high risk of complications and complexity associated with antineoplastic therapy administration has been explained to the pt and family. Chemotherapy will be given with adequate precautions including premedications, hydration and close monitoring during treatment.  Chemotherapy will be administered under my direct supervision. -- SUPPORTIVE MEASURES discussed: Zofran 8 mg Q 8 hrs prn for emesis and nausea. Compazine Q 6 hrs prn. Encourage adequate fluid intake. GI prophylaxis with PPI. Probiotics -- Labs: CMP, . -- Port flush per protocol.  # anemia likely d/t chemo -- Stable, will continue to monitor.  #hypomagnesemia - recheck magnesium level with blood work today     RTC in 3 weeks D1 of next cycle

## 2024-09-06 NOTE — REVIEW OF SYSTEMS
[Negative] : Musculoskeletal [Diabetes] : diabetes mellitus [Abn Vag Bleeding] : abnormal vaginal bleeding [Rash] : no rash noted [Ulcer] : no ulcer was seen [Syncope] : no syncope noted [Neuropathy] : no neuropathy [Depression] : no depression [Dysuria] : no dysuria [Dyspareunia] : no dyspareunia [Incontinence] : no incontinence [Fatigue] : no fatigue [Recent Wt Gain ___ Lbs] : no recent weight gain [Recent Wt Loss___ Lbs] : no recent weight loss [Chest Pain] : no chest pain [BENITEZ] : no dyspnea on exertion [Wheezing] : no wheezing [SOB on Exertion] : no shortness of breath during exertion [Bloody Stools] : no bloody stools [Nausea] : no nausea/vomitting [Muscle Weakness] : no muscle weakness

## 2024-09-06 NOTE — HISTORY OF PRESENT ILLNESS
[FreeTextEntry1] : 62 y/o with stage IVB carcinosarcoma of the uterus  s/p Ro converted to Exlap/MRH/BSO/omentectomy/tumor debulking on 10/16/23 .(+ omentum)  now s/p 6 cycle of chemo completed in 2/2024, (Taxol/carbo/dorsarlimab) tolerated well Currently on maintenance Dostarlimab  No other complaints.

## 2024-09-06 NOTE — HISTORY OF PRESENT ILLNESS
[de-identified] : Ms Patel is a 62 year old AA female with PMHx of HTN, DM type II, Rt breast mass (fibroadenoma confirmed on biopsy), Hx of fibroids who presents for the initial consultation of her Stage IV Carcinosarcoma of the uterus.  Patient reports that she had menopause at age of 56. Since then she had been having intermittent vaginal bleeding. Initially it was attributed to vaginal dryness as it was associated with coitus. Later she switched her GYN and she was told that she had fibroids that caused her vaginal bleeding. She changed her GYN again, and this time she had TVS that showed thickened endometrium and fibroids. She was taken for D&C in the August 2023, and the biopsy came back as carcinosarcoma of uterus. She was seen by Gyn-Onc (Dr. Patino) who took her for ( Ro converted to Exlap/MRH/BSO/omentectomy/tumor debulking on 10/16/23 for carcinosarcoma of the endometrium).  Today, its been two weeks since her surgery and she has been recovering well. She reports that she is eating well, weight is stable, bowel habits are normal and denies any vaginal bleed. She has a mid-abdominal scar that that is having some foul-smelling discharge.  Family Hx is not significant She lives with her daughter and mother. 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   Disease: Uterus   Pathology: Carcinosarcoma   TNM stage: T3A, Nx, M1 AJCC Stage: IV   Tumor Markers: CA-125 74     Interval History: 12/5/23 Pt is s/p 1 cycle of chemo, (Taxol/carbo/dorsarlimab) tolerated well. Had some mild SE which were addressed.  12/26/23 Patient is here to follow up for endometrial cancer. S/P cycle #2 carbo/Taxol, tolerating well except for siffness of fingers post cycle #2 and cold feet. She feels well, denies abdominal pain, nausea, vomiting or abnormal vaginal bleeding or discharge, no fever or chills.  1/16/24 Patient is here to follow up for endometrial cancer. S/P cycle #3 carbo/Taxol and Dostarlimab, tolerating well except for cold feet. She feels well, denies abdominal pain, nausea, vomiting or abnormal vaginal bleeding or discharge, no fever or chills. 2/6/24 Pt is here for follow up. Feels well. Tolerating chemo well. No abd pain, Nausea, vomiting or diarrhea  2/27/24: Pt is here for follow up She endorses peripheral neuropathy but other wise is tolerating chemo well No abd pain, Nausea, vomiting or diarrhea She is scheduled for cycle #6 3/19/24 Pt is here for follow up. Feels well. has mild diarrhea off and on. No abd pain, N, V No vag bleeding.  4/10/24: Pt is here for follow up She endorses peripheral neuropathy but other wise is tolerating immunotherapy well No abdominal pain , Nausea, vomiting or diarrhea She is scheduled for cycle #8  5/1/2024: Pt is here for follow up She still complaining of peripheral neuropathy, specifically cold sensation of b/l lower extremities. Also endorses on and off loose stools. No abdominal pain , Nausea, vomiting. She is scheduled for cycle #9 today. Rolanlod work reviewed. All qsns answered.  5/21/24 Pt is here for follow up. Her neuropathy has improved on Gabapentin. No abdominal pain, Nausea, vomiting. She is scheduled for cycle #10 of Dostarlimab today. CT findings discussed, will continue to follow up with imaging.  06/10/2024 Pt is here for follow up. Her neuropathy has improved on Gabapentin. No abdominal pain, Nausea, vomiting. She is scheduled for cycle #11 of Dostarlimab today. 7/1/24 pt is here for follow up. Feels well. Has occasional cough ? allergies Tolerating treatment well. No abd pain, N, V, SOB  7/22/24 Pt is here for follow up. had Ct scan done and she developed nausea and vomiting. no abd pain and vag bleeding.  8/12/24 Patient is here for follow up. No complaints to offer at this time, tolerating dostarlimab Maintenace well.  [de-identified] : 9/3/24 pt is here for follow up. Feels well although still with some foot numbness. Stabbing pain resolved with gabapentin Po qhs Denies abdominal pain, N, v, vag bleeidng

## 2024-09-06 NOTE — PHYSICAL EXAM
[Absent] : Adnexa(ae): Absent [Normal] : Recto-Vaginal Exam: Normal [Fully active, able to carry on all pre-disease performance without restriction] : Status 0 - Fully active, able to carry on all pre-disease performance without restriction [FreeTextEntry1] : NAD [de-identified] : HECTOR [de-identified] : no edema [de-identified] : soft NT/ND  [de-identified] : well healed [de-identified] : no masses

## 2024-09-06 NOTE — DISCUSSION/SUMMARY
[Reviewed Clinical Lab Test(s)] : Results of clinical tests were reviewed. [Reviewed Radiology Report(s)] : Radiology reports were reviewed. [Reviewed Radiology Film/Image(s)] : Images from radiology studies were reviewed and examined. [Visit Time ___ Minutes] : [unfilled] minutes [Face to Face Time___ Minutes] : with [unfilled] minutes in face to face consultation. [FreeTextEntry1] : Patient with carcinosarcoma of the uterus, at very high risk for recurrence given the stage of her disease. Clinically she is doing well, and has minimal residual neuropathy- improved since she started gabapentin. She is now on maintenance Dostarlimab, Mil trial regimen. She is understandably anxious about her disease, answered several questions and offered emotional support.  Routine follow up in 3 months.

## 2024-09-06 NOTE — ASSESSMENT
[FreeTextEntry1] : In summary, Ms. Patel is a 63 year old AA female with PMHx of HTN, DM type II, Rt breast mass (fibroadenoma confirmed on biopsy), Hx of fibroids with diagnosis of Stage IV Carcinosarcoma of the uterus.  # Stage IV Carcinosarcoma of the uterus # dMMR and [id number] - pT3a, Nx, M1 (biopsy proven omental implants) Biomarker Findings Microsatellite status - MS-Stable Tumor Mutational [place] - 4 Muts/Mb Genomic Findings For a complete list of the genes assayed, please refer to the Foundation report BRD4 amplification NOTCH3 amplification, NOTCH3-ZNF90 fusion TP53 Y234C  I had a detailed discussion with the pt regarding the natural history, treatment and prognosis of uterine cancer. There are multiple unfavorable histologic features incl carcinosarcoma histology, metastasis outside uterus and high grade in this case.  Patient tumor was found to be MMR deficient. In GODFREY trial, among the 118 patients with dMMR, MSI-H tumors, 24-month PFS was 61 percent with Dostarlimab versus 16 percent with placebo (HR 0.28, 95% CI, 0.16-0.50). OS at 24 months was 83 percent in the Dostarlimab group and 59 percent in the placebo group (HR 0.30, 95% CI 0.13-0.70). Only 8.9% of the patient population had carcinosarcoma.  She started on chemotherapy consisting of Carboplatin AUC5 and Taxol 175 mg/m2 with Dostarlimab 500 mg IV every three weeks on 11/15/23 x 6.  RECOMMENDATIONS: Previous notes reviewed and all relevant laboratory and radiology results discussed with and were communicated to the patient.  Continue(cycle # 15 today) Dostarlimab 500 mg IV every 3 weeks. CBC today reviewed and is adequate.  Carboplatin and Taxol discontinued.  Patient had repeat CT Scan of C/A/P with contrast, 7/24 STABLE FINDINGS *** Side effect profile of Dostarlimab were discussed with pt including but not limited to: anemia, fatigue, hyperglycemia, hyponatremia, hypoalbuminemia, itching, cough, nausea, rash, decreased appetite, hypertriglyceridemia, increased liver enzymes, hypocalcemia, constipation, diarrhea, upper respiratory tract infection and an immune-mediated reaction. Lab work will check for elevated liver enzymes and thyroid function.  The high risk of complications and complexity associated with antineoplastic therapy administration has been explained to the pt and family. Chemotherapy will be given with adequate precautions including premedications, hydration and close monitoring during treatment.  Chemotherapy will be administered under my direct supervision. -- SUPPORTIVE MEASURES discussed: Zofran 8 mg Q 8 hrs prn for emesis and nausea. Compazine Q 6 hrs prn. Encourage adequate fluid intake. GI prophylaxis with PPI. Probiotics -- Labs: CMP, . -- Port flush per protocol.  # anemia likely d/t chemo -- Stable, will continue to monitor.  #hypomagnesemia - recheck magnesium level with blood work today     RTC in 3 weeks D1 of next cycle

## 2024-09-06 NOTE — HISTORY OF PRESENT ILLNESS
[de-identified] : Ms Patel is a 62 year old AA female with PMHx of HTN, DM type II, Rt breast mass (fibroadenoma confirmed on biopsy), Hx of fibroids who presents for the initial consultation of her Stage IV Carcinosarcoma of the uterus.  Patient reports that she had menopause at age of 56. Since then she had been having intermittent vaginal bleeding. Initially it was attributed to vaginal dryness as it was associated with coitus. Later she switched her GYN and she was told that she had fibroids that caused her vaginal bleeding. She changed her GYN again, and this time she had TVS that showed thickened endometrium and fibroids. She was taken for D&C in the August 2023, and the biopsy came back as carcinosarcoma of uterus. She was seen by Gyn-Onc (Dr. Patino) who took her for ( Ro converted to Exlap/MRH/BSO/omentectomy/tumor debulking on 10/16/23 for carcinosarcoma of the endometrium).  Today, its been two weeks since her surgery and she has been recovering well. She reports that she is eating well, weight is stable, bowel habits are normal and denies any vaginal bleed. She has a mid-abdominal scar that that is having some foul-smelling discharge.  Family Hx is not significant She lives with her daughter and mother. 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   Disease: Uterus   Pathology: Carcinosarcoma   TNM stage: T3A, Nx, M1 AJCC Stage: IV   Tumor Markers: CA-125 74     Interval History: 12/5/23 Pt is s/p 1 cycle of chemo, (Taxol/carbo/dorsarlimab) tolerated well. Had some mild SE which were addressed.  12/26/23 Patient is here to follow up for endometrial cancer. S/P cycle #2 carbo/Taxol, tolerating well except for siffness of fingers post cycle #2 and cold feet. She feels well, denies abdominal pain, nausea, vomiting or abnormal vaginal bleeding or discharge, no fever or chills.  1/16/24 Patient is here to follow up for endometrial cancer. S/P cycle #3 carbo/Taxol and Dostarlimab, tolerating well except for cold feet. She feels well, denies abdominal pain, nausea, vomiting or abnormal vaginal bleeding or discharge, no fever or chills. 2/6/24 Pt is here for follow up. Feels well. Tolerating chemo well. No abd pain, Nausea, vomiting or diarrhea  2/27/24: Pt is here for follow up She endorses peripheral neuropathy but other wise is tolerating chemo well No abd pain, Nausea, vomiting or diarrhea She is scheduled for cycle #6 3/19/24 Pt is here for follow up. Feels well. has mild diarrhea off and on. No abd pain, N, V No vag bleeding.  4/10/24: Pt is here for follow up She endorses peripheral neuropathy but other wise is tolerating immunotherapy well No abdominal pain , Nausea, vomiting or diarrhea She is scheduled for cycle #8  5/1/2024: Pt is here for follow up She still complaining of peripheral neuropathy, specifically cold sensation of b/l lower extremities. Also endorses on and off loose stools. No abdominal pain , Nausea, vomiting. She is scheduled for cycle #9 today. Rolanlod work reviewed. All qsns answered.  5/21/24 Pt is here for follow up. Her neuropathy has improved on Gabapentin. No abdominal pain, Nausea, vomiting. She is scheduled for cycle #10 of Dostarlimab today. CT findings discussed, will continue to follow up with imaging.  06/10/2024 Pt is here for follow up. Her neuropathy has improved on Gabapentin. No abdominal pain, Nausea, vomiting. She is scheduled for cycle #11 of Dostarlimab today. 7/1/24 pt is here for follow up. Feels well. Has occasional cough ? allergies Tolerating treatment well. No abd pain, N, V, SOB  7/22/24 Pt is here for follow up. had Ct scan done and she developed nausea and vomiting. no abd pain and vag bleeding.  8/12/24 Patient is here for follow up. No complaints to offer at this time, tolerating dostarlimab Maintenace well.  [de-identified] : 9/3/24 pt is here for follow up. Feels well although still with some foot numbness. Stabbing pain resolved with gabapentin Po qhs Denies abdominal pain, N, v, vag bleeidng

## 2024-09-10 ENCOUNTER — APPOINTMENT (OUTPATIENT)
Facility: HOSPITAL | Age: 64
End: 2024-09-10

## 2024-09-24 ENCOUNTER — LABORATORY RESULT (OUTPATIENT)
Age: 64
End: 2024-09-24

## 2024-09-24 ENCOUNTER — APPOINTMENT (OUTPATIENT)
Age: 64
End: 2024-09-24
Payer: COMMERCIAL

## 2024-09-24 ENCOUNTER — OUTPATIENT (OUTPATIENT)
Dept: OUTPATIENT SERVICES | Facility: HOSPITAL | Age: 64
LOS: 1 days | End: 2024-09-24
Payer: COMMERCIAL

## 2024-09-24 VITALS
SYSTOLIC BLOOD PRESSURE: 122 MMHG | RESPIRATION RATE: 17 BRPM | WEIGHT: 238.9 LBS | TEMPERATURE: 98.3 F | HEART RATE: 74 BPM | OXYGEN SATURATION: 99 % | HEIGHT: 65.5 IN | BODY MASS INDEX: 39.32 KG/M2 | DIASTOLIC BLOOD PRESSURE: 75 MMHG

## 2024-09-24 VITALS — TEMPERATURE: 98 F | DIASTOLIC BLOOD PRESSURE: 75 MMHG | HEART RATE: 70 BPM | SYSTOLIC BLOOD PRESSURE: 122 MMHG

## 2024-09-24 DIAGNOSIS — Z51.11 ENCOUNTER FOR ANTINEOPLASTIC CHEMOTHERAPY: ICD-10-CM

## 2024-09-24 DIAGNOSIS — Z90.49 ACQUIRED ABSENCE OF OTHER SPECIFIED PARTS OF DIGESTIVE TRACT: Chronic | ICD-10-CM

## 2024-09-24 DIAGNOSIS — Z98.890 OTHER SPECIFIED POSTPROCEDURAL STATES: Chronic | ICD-10-CM

## 2024-09-24 DIAGNOSIS — C54.1 MALIGNANT NEOPLASM OF ENDOMETRIUM: ICD-10-CM

## 2024-09-24 PROCEDURE — 99214 OFFICE O/P EST MOD 30 MIN: CPT

## 2024-09-24 PROCEDURE — 80053 COMPREHEN METABOLIC PANEL: CPT

## 2024-09-24 PROCEDURE — 85027 COMPLETE CBC AUTOMATED: CPT

## 2024-09-24 PROCEDURE — 96413 CHEMO IV INFUSION 1 HR: CPT

## 2024-09-24 PROCEDURE — 86304 IMMUNOASSAY TUMOR CA 125: CPT

## 2024-09-24 PROCEDURE — 36415 COLL VENOUS BLD VENIPUNCTURE: CPT

## 2024-09-24 PROCEDURE — 84443 ASSAY THYROID STIM HORMONE: CPT

## 2024-09-24 RX ORDER — DOSTARLIMAB 50 MG/ML
500 INJECTION INTRAVENOUS ONCE
Refills: 0 | Status: COMPLETED | OUTPATIENT
Start: 2024-09-24 | End: 2024-09-24

## 2024-09-24 RX ADMIN — DOSTARLIMAB 500 MILLIGRAM(S): 50 INJECTION INTRAVENOUS at 12:55

## 2024-09-24 RX ADMIN — DOSTARLIMAB 500 MILLIGRAM(S): 50 INJECTION INTRAVENOUS at 13:25

## 2024-09-24 NOTE — BEGINNING OF VISIT
[1] : 2) Feeling down, depressed, or hopeless for several days (1) [PHQ-2 Negative] : PHQ-2 Negative [PHQ-9 Negative] : PHQ-9 Negative [Pain Scale: ___] : On a scale of 1-10, today the patient's pain is a(n) [unfilled]. [Former] : Former [< 15 Years] : < 15 Years [Date Discussed (MM/DD/YY): ___] : Discussed: [unfilled] [Reviewed, no changes] : Reviewed, no changes [[id number]] : 2 [Abdominal Pain] : no abdominal pain [Vomiting] : no vomiting [Constipation] : no constipation

## 2024-09-24 NOTE — ASSESSMENT
[Reviewed no changes] : Reviewed no changes [Name: ___] : Name: [unfilled] [Relationship: ___] : Relationship: [unfilled] [DNR] : DNR [FreeTextEntry1] : In summary, Ms. Patel is a 63 year old AA female with PMHx of HTN, DM type II, Rt breast mass (fibroadenoma confirmed on biopsy), Hx of fibroids with diagnosis of Stage IV Carcinosarcoma of the uterus.  # Stage IV Carcinosarcoma of the uterus # dMMR and LVL2xytasitu - pT3a, Nx, M1 (biopsy proven omental implants) Biomarker Findings Microsatellite status - MS-Stable Tumor Mutational [place] - 4 Muts/Mb Genomic Findings For a complete list of the genes assayed, please refer to the Foundation report BRD4 amplification NOTCH3 amplification, NOTCH3-ZNF90 fusion TP53 Y234C  I had a detailed discussion with the pt regarding the natural history, treatment and prognosis of uterine cancer. There are multiple unfavorable histologic features incl carcinosarcoma histology, metastasis outside uterus and high grade in this case.  Patient tumor was found to be MMR deficient. In GODFREY trial, among the 118 patients with dMMR, MSI-H tumors, 24-month PFS was 61 percent with Dostarlimab versus 16 percent with placebo (HR 0.28, 95% CI, 0.16-0.50). OS at 24 months was 83 percent in the Dostarlimab group and 59 percent in the placebo group (HR 0.30, 95% CI 0.13-0.70). Only 8.9% of the patient population had carcinosarcoma.  She started on chemotherapy consisting of Carboplatin AUC5 and Taxol 175 mg/m2 with Dostarlimab 500 mg IV every three weeks on 11/15/23 x 6.  RECOMMENDATIONS: Previous notes reviewed and all relevant laboratory and radiology results discussed with and were communicated to the patient.  Continue Dostarlimab 500 mg IV every 3 weeks. CBC today reviewed and is adequate.  Carboplatin and Taxol discontinued.  Patient had repeat CT Scan of C/A/P with contrast, 7/24 and reviewed already with pt per Dr. Henry.  Rx given for CT scan C/A/P in 10/2024. STABLE FINDINGS *** Side effect profile of Dostarlimab were discussed with pt including but not limited to: anemia, fatigue, hyperglycemia, hyponatremia, hypoalbuminemia, itching, cough, nausea, rash, decreased appetite, hypertriglyceridemia, increased liver enzymes, hypocalcemia, constipation, diarrhea, upper respiratory tract infection and an immune-mediated reaction. Lab work will check for elevated liver enzymes and thyroid function.  The high risk of complications and complexity associated with antineoplastic therapy administration has been explained to the pt and family. Chemotherapy will be given with adequate precautions including premedications, hydration and close monitoring during treatment.  Chemotherapy will be administered under my direct supervision. -- SUPPORTIVE MEASURES discussed: Zofran 8 mg Q 8 hrs prn for emesis and nausea. Compazine Q 6 hrs prn. Encourage adequate fluid intake. GI prophylaxis with PPI. Probiotics -- Labs: CMP, . -- Port flush per protocol.  # anemia likely d/t chemo -- Stable, will continue to monitor.  #hypomagnesemia - recheck magnesium level with blood work today   -Follow up with Dr. Amezquita, cardio-oncologist for f/u stress test per her previous cardiologist in [place].  RTC in 3 weeks D1 of next cycle    [AdvancecareDate] : 09/24/24

## 2024-09-24 NOTE — BEGINNING OF VISIT
[1] : 2) Feeling down, depressed, or hopeless for several days (1) [PHQ-2 Negative] : PHQ-2 Negative [PHQ-9 Negative] : PHQ-9 Negative [Pain Scale: ___] : On a scale of 1-10, today the patient's pain is a(n) [unfilled]. [Former] : Former [< 15 Years] : < 15 Years [Date Discussed (MM/DD/YY): ___] : Discussed: [unfilled] [Reviewed, no changes] : Reviewed, no changes [FCF4Tnqca] : 2 [Abdominal Pain] : no abdominal pain [Vomiting] : no vomiting [Constipation] : no constipation

## 2024-09-24 NOTE — PHYSICAL EXAM
[Fully active, able to carry on all pre-disease performance without restriction] : Status 0 - Fully active, able to carry on all pre-disease performance without restriction [Normal] : affect appropriate [de-identified] : port placed in right upper chest area- no erythema or tenderness [FreeTextEntry1] : non-inflamed external hemorrhoid visualized, non-tender

## 2024-09-24 NOTE — HISTORY OF PRESENT ILLNESS
[de-identified] : Ms Patel is a 62 year old AA female with PMHx of HTN, DM type II, Rt breast mass (fibroadenoma confirmed on biopsy), Hx of fibroids who presents for the initial consultation of her Stage IV Carcinosarcoma of the uterus.  Patient reports that she had menopause at age of 56. Since then she had been having intermittent vaginal bleeding. Initially it was attributed to vaginal dryness as it was associated with coitus. Later she switched her GYN and she was told that she had fibroids that caused her vaginal bleeding. She changed her GYN again, and this time she had TVS that showed thickened endometrium and fibroids. She was taken for D&C in the August 2023, and the biopsy came back as carcinosarcoma of uterus. She was seen by Gyn-Onc (Dr. Patino) who took her for ( Ro converted to Exlap/MRH/BSO/omentectomy/tumor debulking on 10/16/23 for carcinosarcoma of the endometrium).  Today, its been two weeks since her surgery and she has been recovering well. She reports that she is eating well, weight is stable, bowel habits are normal and denies any vaginal bleed. She has a mid-abdominal scar that that is having some foul-smelling discharge.  Family Hx is not significant She lives with her daughter and mother. 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   Disease: Uterus   Pathology: Carcinosarcoma   TNM stage: T3A, Nx, M1 AJCC Stage: IV   Tumor Markers: CA-125 74     Interval History: 12/5/23 Pt is s/p 1 cycle of chemo, (Taxol/carbo/dorsarlimab) tolerated well. Had some mild SE which were addressed.  12/26/23 Patient is here to follow up for endometrial cancer. S/P cycle #2 carbo/Taxol, tolerating well except for siffness of fingers post cycle #2 and cold feet. She feels well, denies abdominal pain, nausea, vomiting or abnormal vaginal bleeding or discharge, no fever or chills.  1/16/24 Patient is here to follow up for endometrial cancer. S/P cycle #3 carbo/Taxol and Dostarlimab, tolerating well except for cold feet. She feels well, denies abdominal pain, nausea, vomiting or abnormal vaginal bleeding or discharge, no fever or chills. 2/6/24 Pt is here for follow up. Feels well. Tolerating chemo well. No abd pain, Nausea, vomiting or diarrhea  2/27/24: Pt is here for follow up She endorses peripheral neuropathy but other wise is tolerating chemo well No abd pain, Nausea, vomiting or diarrhea She is scheduled for cycle #6 3/19/24 Pt is here for follow up. Feels well. has mild diarrhea off and on. No abd pain, N, V No vag bleeding.  4/10/24: Pt is here for follow up She endorses peripheral neuropathy but other wise is tolerating immunotherapy well No abdominal pain , Nausea, vomiting or diarrhea She is scheduled for cycle #8  5/1/2024: Pt is here for follow up She still complaining of peripheral neuropathy, specifically cold sensation of b/l lower extremities. Also endorses on and off loose stools. No abdominal pain , Nausea, vomiting. She is scheduled for cycle #9 today. Rolanlod work reviewed. All qsns answered.  5/21/24 Pt is here for follow up. Her neuropathy has improved on Gabapentin. No abdominal pain, Nausea, vomiting. She is scheduled for cycle #10 of Dostarlimab today. CT findings discussed, will continue to follow up with imaging.  06/10/2024 Pt is here for follow up. Her neuropathy has improved on Gabapentin. No abdominal pain, Nausea, vomiting. She is scheduled for cycle #11 of Dostarlimab today. 7/1/24 pt is here for follow up. Feels well. Has occasional cough ? allergies Tolerating treatment well. No abd pain, N, V, SOB  7/22/24 Pt is here for follow up. had Ct scan done and she developed nausea and vomiting. no abd pain and vag bleeding.  8/12/24 Patient is here for follow up. No complaints to offer at this time, tolerating dostarlimab Maintenace well.  [de-identified] : 9/3/24 pt is here for follow up. Feels well although still with some foot numbness. Stabbing pain resolved with gabapentin Po qhs Denies abdominal pain, N, v, vag bleeding  9/24/24 Pt is here for follow up visit. Feels well although still with some foot numbness periodically. Stabbing pain resolved with Gabapentin Po q hs Denies abdominal pain, N, V, vag bleeding. She noticed a lump in her rectal area- appears to be external hemorrhoid on exam. She has not been able to make appt with Dr. Amezquita, cardio-oncologist reviously due to medical records requirement.  Scanned into chart already.

## 2024-09-24 NOTE — HISTORY OF PRESENT ILLNESS
[de-identified] : Ms Patel is a 62 year old AA female with PMHx of HTN, DM type II, Rt breast mass (fibroadenoma confirmed on biopsy), Hx of fibroids who presents for the initial consultation of her Stage IV Carcinosarcoma of the uterus.  Patient reports that she had menopause at age of 56. Since then she had been having intermittent vaginal bleeding. Initially it was attributed to vaginal dryness as it was associated with coitus. Later she switched her GYN and she was told that she had fibroids that caused her vaginal bleeding. She changed her GYN again, and this time she had TVS that showed thickened endometrium and fibroids. She was taken for D&C in the August 2023, and the biopsy came back as carcinosarcoma of uterus. She was seen by Gyn-Onc (Dr. Patino) who took her for ( Ro converted to Exlap/MRH/BSO/omentectomy/tumor debulking on 10/16/23 for carcinosarcoma of the endometrium).  Today, its been two weeks since her surgery and she has been recovering well. She reports that she is eating well, weight is stable, bowel habits are normal and denies any vaginal bleed. She has a mid-abdominal scar that that is having some foul-smelling discharge.  Family Hx is not significant She lives with her daughter and mother. 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   Disease: Uterus   Pathology: Carcinosarcoma   TNM stage: T3A, Nx, M1 AJCC Stage: IV   Tumor Markers: CA-125 74     Interval History: 12/5/23 Pt is s/p 1 cycle of chemo, (Taxol/carbo/dorsarlimab) tolerated well. Had some mild SE which were addressed.  12/26/23 Patient is here to follow up for endometrial cancer. S/P cycle #2 carbo/Taxol, tolerating well except for siffness of fingers post cycle #2 and cold feet. She feels well, denies abdominal pain, nausea, vomiting or abnormal vaginal bleeding or discharge, no fever or chills.  1/16/24 Patient is here to follow up for endometrial cancer. S/P cycle #3 carbo/Taxol and Dostarlimab, tolerating well except for cold feet. She feels well, denies abdominal pain, nausea, vomiting or abnormal vaginal bleeding or discharge, no fever or chills. 2/6/24 Pt is here for follow up. Feels well. Tolerating chemo well. No abd pain, Nausea, vomiting or diarrhea  2/27/24: Pt is here for follow up She endorses peripheral neuropathy but other wise is tolerating chemo well No abd pain, Nausea, vomiting or diarrhea She is scheduled for cycle #6 3/19/24 Pt is here for follow up. Feels well. has mild diarrhea off and on. No abd pain, N, V No vag bleeding.  4/10/24: Pt is here for follow up She endorses peripheral neuropathy but other wise is tolerating immunotherapy well No abdominal pain , Nausea, vomiting or diarrhea She is scheduled for cycle #8  5/1/2024: Pt is here for follow up She still complaining of peripheral neuropathy, specifically cold sensation of b/l lower extremities. Also endorses on and off loose stools. No abdominal pain , Nausea, vomiting. She is scheduled for cycle #9 today. Rolanlod work reviewed. All qsns answered.  5/21/24 Pt is here for follow up. Her neuropathy has improved on Gabapentin. No abdominal pain, Nausea, vomiting. She is scheduled for cycle #10 of Dostarlimab today. CT findings discussed, will continue to follow up with imaging.  06/10/2024 Pt is here for follow up. Her neuropathy has improved on Gabapentin. No abdominal pain, Nausea, vomiting. She is scheduled for cycle #11 of Dostarlimab today. 7/1/24 pt is here for follow up. Feels well. Has occasional cough ? allergies Tolerating treatment well. No abd pain, N, V, SOB  7/22/24 Pt is here for follow up. had Ct scan done and she developed nausea and vomiting. no abd pain and vag bleeding.  8/12/24 Patient is here for follow up. No complaints to offer at this time, tolerating dostarlimab Maintenace well.  [de-identified] : 9/3/24 pt is here for follow up. Feels well although still with some foot numbness. Stabbing pain resolved with gabapentin Po qhs Denies abdominal pain, N, v, vag bleeding  9/24/24 Pt is here for follow up visit. Feels well although still with some foot numbness periodically. Stabbing pain resolved with Gabapentin Po q hs Denies abdominal pain, N, V, vag bleeding. She noticed a lump in her rectal area- appears to be external hemorrhoid on exam. She has not been able to make appt with Dr. Amezquita, cardio-oncologist reviously due to medical records requirement.  Scanned into chart already.

## 2024-09-24 NOTE — PHYSICAL EXAM
[Fully active, able to carry on all pre-disease performance without restriction] : Status 0 - Fully active, able to carry on all pre-disease performance without restriction [Normal] : affect appropriate [de-identified] : port placed in right upper chest area- no erythema or tenderness [FreeTextEntry1] : non-inflamed external hemorrhoid visualized, non-tender

## 2024-09-24 NOTE — ASSESSMENT
[Reviewed no changes] : Reviewed no changes [Name: ___] : Name: [unfilled] [Relationship: ___] : Relationship: [unfilled] [DNR] : DNR [FreeTextEntry1] : In summary, Ms. Patel is a 63 year old AA female with PMHx of HTN, DM type II, Rt breast mass (fibroadenoma confirmed on biopsy), Hx of fibroids with diagnosis of Stage IV Carcinosarcoma of the uterus.  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 There are multiple unfavorable histologic features incl carcinosarcoma histology, metastasis outside uterus and high grade in this case.  Patient tumor was found to be MMR deficient. In GODFREY trial, among the 118 patients with dMMR, MSI-H tumors, 24-month PFS was 61 percent with Dostarlimab versus 16 percent with placebo (HR 0.28, 95% CI, 0.16-0.50). OS at 24 months was 83 percent in the Dostarlimab group and 59 percent in the placebo group (HR 0.30, 95% CI 0.13-0.70). Only 8.9% of the patient population had carcinosarcoma.  She started on chemotherapy consisting of Carboplatin AUC5 and Taxol 175 mg/m2 with Dostarlimab 500 mg IV every three weeks on 11/15/23 x 6.  RECOMMENDATIONS: Previous notes reviewed and all relevant laboratory and radiology results discussed with and were communicated to the patient.  Continue Dostarlimab 500 mg IV every 3 weeks. CBC today reviewed and is adequate.  Carboplatin and Taxol discontinued.  Patient had repeat CT Scan of C/A/P with contrast, 7/24 and reviewed already with pt per Dr. Henry.  Rx given for CT scan C/A/P in 10/2024. STABLE FINDINGS *** Side effect profile of Dostarlimab were discussed with pt including but not limited to: anemia, fatigue, hyperglycemia, hyponatremia, hypoalbuminemia, itching, cough, nausea, rash, decreased appetite, hypertriglyceridemia, increased liver enzymes, hypocalcemia, constipation, diarrhea, upper respiratory tract infection and an immune-mediated reaction. Lab work will check for elevated liver enzymes and thyroid function.  The high risk of complications and complexity associated with antineoplastic therapy administration has been explained to the pt and family. Chemotherapy will be given with adequate precautions including premedications, hydration and close monitoring during treatment.  Chemotherapy will be administered under my direct supervision. -- SUPPORTIVE MEASURES discussed: Zofran 8 mg Q 8 hrs prn for emesis and nausea. Compazine Q 6 hrs prn. Encourage adequate fluid intake. GI prophylaxis with PPI. Probiotics -- Labs: CMP, . -- Port flush per protocol.  # anemia likely d/t chemo -- Stable, will continue to monitor.  #hypomagnesemia - recheck magnesium level with blood work today   -Follow up with Dr. Amezquita, cardio-oncologist for f/u stress test per her previous cardiologist in [place].  RTC in 3 weeks D1 of next cycle    [AdvancecareDate] : 09/24/24

## 2024-09-25 LAB — CANCER AG125 SERPL-ACNC: 4 U/ML

## 2024-09-30 LAB
ALBUMIN SERPL ELPH-MCNC: 4.4 G/DL
ALP BLD-CCNC: 87 U/L
ALT SERPL-CCNC: 11 U/L
ANION GAP SERPL CALC-SCNC: 10 MMOL/L
AST SERPL-CCNC: 10 U/L
BILIRUB SERPL-MCNC: 0.2 MG/DL
BUN SERPL-MCNC: 29 MG/DL
CALCIUM SERPL-MCNC: 9.6 MG/DL
CHLORIDE SERPL-SCNC: 104 MMOL/L
CO2 SERPL-SCNC: 30 MMOL/L
CREAT SERPL-MCNC: 1.1 MG/DL
EGFR: 57 ML/MIN/1.73M2
GLUCOSE SERPL-MCNC: 107 MG/DL
HCT VFR BLD CALC: 32.8 %
HGB BLD-MCNC: 10.7 G/DL
MCHC RBC-ENTMCNC: 26.4 PG
MCHC RBC-ENTMCNC: 32.6 G/DL
MCV RBC AUTO: 80.8 FL
PLATELET # BLD AUTO: 222 K/UL
PMV BLD: 10.9 FL
POTASSIUM SERPL-SCNC: 4.1 MMOL/L
PROT SERPL-MCNC: 6.7 G/DL
RBC # BLD: 4.06 M/UL
RBC # FLD: 14.6 %
SODIUM SERPL-SCNC: 144 MMOL/L
TSH SERPL-ACNC: 1.01 UIU/ML
WBC # FLD AUTO: 6.28 K/UL

## 2024-09-30 NOTE — PATIENT PROFILE ADULT - HEALTH LITERACY
FMLA forms received via FAX from [facility].      Forms to be completed and put in folder for provider to approve.    Fax #:  [phone]      Blessing Cotton     no

## 2024-10-01 ENCOUNTER — APPOINTMENT (OUTPATIENT)
Facility: HOSPITAL | Age: 64
End: 2024-10-01
Payer: COMMERCIAL

## 2024-10-01 ENCOUNTER — OUTPATIENT (OUTPATIENT)
Dept: OUTPATIENT SERVICES | Facility: HOSPITAL | Age: 64
LOS: 1 days | End: 2024-10-01
Payer: COMMERCIAL

## 2024-10-01 VITALS
DIASTOLIC BLOOD PRESSURE: 81 MMHG | TEMPERATURE: 98.1 F | RESPIRATION RATE: 16 BRPM | WEIGHT: 241 LBS | BODY MASS INDEX: 39.67 KG/M2 | SYSTOLIC BLOOD PRESSURE: 141 MMHG | HEART RATE: 72 BPM | HEIGHT: 65.5 IN

## 2024-10-01 DIAGNOSIS — E66.9 OBESITY, UNSPECIFIED: ICD-10-CM

## 2024-10-01 DIAGNOSIS — C54.1 MALIGNANT NEOPLASM OF ENDOMETRIUM: ICD-10-CM

## 2024-10-01 DIAGNOSIS — E78.5 HYPERLIPIDEMIA, UNSPECIFIED: ICD-10-CM

## 2024-10-01 DIAGNOSIS — I25.10 ATHEROSCLEROTIC HEART DISEASE OF NATIVE CORONARY ARTERY W/OUT ANGINA PECTORIS: ICD-10-CM

## 2024-10-01 DIAGNOSIS — Z98.890 OTHER SPECIFIED POSTPROCEDURAL STATES: Chronic | ICD-10-CM

## 2024-10-01 DIAGNOSIS — Z90.49 ACQUIRED ABSENCE OF OTHER SPECIFIED PARTS OF DIGESTIVE TRACT: Chronic | ICD-10-CM

## 2024-10-01 DIAGNOSIS — I10 ESSENTIAL (PRIMARY) HYPERTENSION: ICD-10-CM

## 2024-10-01 PROCEDURE — 93005 ELECTROCARDIOGRAM TRACING: CPT

## 2024-10-01 PROCEDURE — 99204 OFFICE O/P NEW MOD 45 MIN: CPT

## 2024-10-01 PROCEDURE — 93010 ELECTROCARDIOGRAM REPORT: CPT

## 2024-10-01 RX ORDER — AMLODIPINE BESYLATE 10 MG/1
10 TABLET ORAL
Qty: 90 | Refills: 3 | Status: ACTIVE | COMMUNITY
Start: 2024-10-01 | End: 1900-01-01

## 2024-10-01 RX ORDER — LISINOPRIL AND HYDROCHLOROTHIAZIDE TABLETS 20; 25 MG/1; MG/1
20-25 TABLET ORAL DAILY
Qty: 90 | Refills: 3 | Status: ACTIVE | COMMUNITY
Start: 2024-10-01 | End: 1900-01-01

## 2024-10-01 RX ORDER — ATORVASTATIN CALCIUM 20 MG/1
20 TABLET, FILM COATED ORAL
Qty: 90 | Refills: 1 | Status: ACTIVE | COMMUNITY
Start: 2024-10-01 | End: 1900-01-01

## 2024-10-01 RX ORDER — METOPROLOL SUCCINATE 25 MG/1
25 TABLET, EXTENDED RELEASE ORAL DAILY
Qty: 45 | Refills: 3 | Status: ACTIVE | COMMUNITY
Start: 2024-10-01 | End: 1900-01-01

## 2024-10-01 NOTE — ASSESSMENT
[FreeTextEntry1] : #non obstructive CAD: #HTN #DL #DM #endometrial cancer s/p Sx currently on immunotherapy maintenance tolerating well #Obeisty   currently stable to active symptoms currently stable asymptomatic  Plan: continue asa, statin  continue metoprolol  continue lisinopril/HCTZ continue amlodipine will check lipid panel, HBA1C monitor BP at home on regular basis  will do echo  heart healthy diet ,exercise and weight loss discussed importance to control BP , DM and DL oncology follow up  previous chart reviewed in detail  medical condition discussed with patient, all questions answered  follow up in 2-3 months.

## 2024-10-01 NOTE — HISTORY OF PRESENT ILLNESS
[FreeTextEntry1] : I have the pleasure to see Ms Patel today for cardiology evaluation. She is a 63 year old female patient with PMHX of non obstructive CAD diagnosed on 7/2023 post CCTA) HTN , DM, DL, diagnosed with endometrial cancer last year 8/2023 underwent Sx on 10.2023, and underwent chemo with taxo/carbo/dosarlimab , currently is on dostarlimab maintenance dose  doing well.  previous cardiac workup CCTA 7/2023 CAC 52  mild non obstructive disease < 25% stress echo 7/2023 negative for ischemia  echo 9/2023 normal EF no significant valve disease, LCH concentric.   Currently stable denies chest pain , sob, caballero or palpitations.  BP repeated 130/75  EKG normal sinus rhythm    Medications: metformin 1000 ng bid atorvastatin 20 mg at bedtime metoprolol XL 12.5 mg daily amlodipine 10 mg daily lisinopril HCTZ 20/25 mg daily vit D 3  magnesium gabapentin

## 2024-10-01 NOTE — REVIEW OF SYSTEMS
[Fever] : no fever [Chills] : no chills [Convulsions] : no convulsions [Confusion] : no confusion was observed [Negative] : Respiratory

## 2024-10-02 ENCOUNTER — NON-APPOINTMENT (OUTPATIENT)
Age: 64
End: 2024-10-02

## 2024-10-02 DIAGNOSIS — C54.1 MALIGNANT NEOPLASM OF ENDOMETRIUM: ICD-10-CM

## 2024-10-09 ENCOUNTER — NON-APPOINTMENT (OUTPATIENT)
Age: 64
End: 2024-10-09

## 2024-10-14 ENCOUNTER — APPOINTMENT (OUTPATIENT)
Dept: CARDIOLOGY | Facility: CLINIC | Age: 64
End: 2024-10-14
Payer: COMMERCIAL

## 2024-10-14 DIAGNOSIS — I10 ESSENTIAL (PRIMARY) HYPERTENSION: ICD-10-CM

## 2024-10-14 DIAGNOSIS — E78.5 HYPERLIPIDEMIA, UNSPECIFIED: ICD-10-CM

## 2024-10-14 DIAGNOSIS — I25.10 ATHEROSCLEROTIC HEART DISEASE OF NATIVE CORONARY ARTERY W/OUT ANGINA PECTORIS: ICD-10-CM

## 2024-10-14 PROCEDURE — 93306 TTE W/DOPPLER COMPLETE: CPT

## 2024-10-15 ENCOUNTER — APPOINTMENT (OUTPATIENT)
Age: 64
End: 2024-10-15
Payer: COMMERCIAL

## 2024-10-15 ENCOUNTER — LABORATORY RESULT (OUTPATIENT)
Age: 64
End: 2024-10-15

## 2024-10-15 ENCOUNTER — OUTPATIENT (OUTPATIENT)
Dept: OUTPATIENT SERVICES | Facility: HOSPITAL | Age: 64
LOS: 1 days | End: 2024-10-15
Payer: COMMERCIAL

## 2024-10-15 VITALS
TEMPERATURE: 98.7 F | BODY MASS INDEX: 39.84 KG/M2 | HEIGHT: 65.5 IN | HEART RATE: 90 BPM | RESPIRATION RATE: 16 BRPM | OXYGEN SATURATION: 96 % | DIASTOLIC BLOOD PRESSURE: 74 MMHG | SYSTOLIC BLOOD PRESSURE: 152 MMHG | WEIGHT: 242 LBS

## 2024-10-15 VITALS — SYSTOLIC BLOOD PRESSURE: 152 MMHG | TEMPERATURE: 99 F | HEART RATE: 90 BPM | DIASTOLIC BLOOD PRESSURE: 74 MMHG

## 2024-10-15 DIAGNOSIS — G62.9 POLYNEUROPATHY, UNSPECIFIED: ICD-10-CM

## 2024-10-15 DIAGNOSIS — D50.9 IRON DEFICIENCY ANEMIA, UNSPECIFIED: ICD-10-CM

## 2024-10-15 DIAGNOSIS — Z90.49 ACQUIRED ABSENCE OF OTHER SPECIFIED PARTS OF DIGESTIVE TRACT: Chronic | ICD-10-CM

## 2024-10-15 DIAGNOSIS — Z98.890 OTHER SPECIFIED POSTPROCEDURAL STATES: Chronic | ICD-10-CM

## 2024-10-15 DIAGNOSIS — C54.1 MALIGNANT NEOPLASM OF ENDOMETRIUM: ICD-10-CM

## 2024-10-15 DIAGNOSIS — Z29.89 ENCOUNTER. FOR OTHER SPECIFIED PROPHYLACTIC MEASURES: ICD-10-CM

## 2024-10-15 LAB
HCT VFR BLD CALC: 34.3 %
HGB BLD-MCNC: 10.9 G/DL
MCHC RBC-ENTMCNC: 26 PG
MCHC RBC-ENTMCNC: 31.8 G/DL
MCV RBC AUTO: 81.7 FL
PLATELET # BLD AUTO: 223 K/UL
PMV BLD: 11.2 FL
RBC # BLD: 4.2 M/UL
RBC # FLD: 14.2 %
WBC # FLD AUTO: 7.32 K/UL

## 2024-10-15 PROCEDURE — 84443 ASSAY THYROID STIM HORMONE: CPT

## 2024-10-15 PROCEDURE — 99214 OFFICE O/P EST MOD 30 MIN: CPT

## 2024-10-15 PROCEDURE — G2211 COMPLEX E/M VISIT ADD ON: CPT

## 2024-10-15 PROCEDURE — 86304 IMMUNOASSAY TUMOR CA 125: CPT

## 2024-10-15 PROCEDURE — 36415 COLL VENOUS BLD VENIPUNCTURE: CPT

## 2024-10-15 PROCEDURE — 80053 COMPREHEN METABOLIC PANEL: CPT

## 2024-10-15 PROCEDURE — 96413 CHEMO IV INFUSION 1 HR: CPT

## 2024-10-15 PROCEDURE — 85027 COMPLETE CBC AUTOMATED: CPT

## 2024-10-15 RX ORDER — DOSTARLIMAB 50 MG/ML
500 INJECTION INTRAVENOUS ONCE
Refills: 0 | Status: COMPLETED | OUTPATIENT
Start: 2024-10-15 | End: 2024-10-15

## 2024-10-15 RX ADMIN — DOSTARLIMAB 500 MILLIGRAM(S): 50 INJECTION INTRAVENOUS at 13:42

## 2024-10-15 RX ADMIN — DOSTARLIMAB 500 MILLIGRAM(S): 50 INJECTION INTRAVENOUS at 14:15

## 2024-10-16 DIAGNOSIS — C54.1 MALIGNANT NEOPLASM OF ENDOMETRIUM: ICD-10-CM

## 2024-10-16 LAB
ALBUMIN SERPL ELPH-MCNC: 4.5 G/DL
ALP BLD-CCNC: 86 U/L
ALT SERPL-CCNC: 11 U/L
ANION GAP SERPL CALC-SCNC: 10 MMOL/L
AST SERPL-CCNC: 10 U/L
BILIRUB SERPL-MCNC: 0.2 MG/DL
BUN SERPL-MCNC: 24 MG/DL
CALCIUM SERPL-MCNC: 9.3 MG/DL
CANCER AG125 SERPL-ACNC: 4 U/ML
CHLORIDE SERPL-SCNC: 102 MMOL/L
CO2 SERPL-SCNC: 29 MMOL/L
CREAT SERPL-MCNC: 1 MG/DL
EGFR: 64 ML/MIN/1.73M2
GLUCOSE SERPL-MCNC: 101 MG/DL
POTASSIUM SERPL-SCNC: 4 MMOL/L
PROT SERPL-MCNC: 6.8 G/DL
SODIUM SERPL-SCNC: 141 MMOL/L
TSH SERPL-ACNC: 1.83 UIU/ML

## 2024-11-05 ENCOUNTER — OUTPATIENT (OUTPATIENT)
Dept: OUTPATIENT SERVICES | Facility: HOSPITAL | Age: 64
LOS: 1 days | End: 2024-11-05
Payer: COMMERCIAL

## 2024-11-05 ENCOUNTER — APPOINTMENT (OUTPATIENT)
Age: 64
End: 2024-11-05
Payer: COMMERCIAL

## 2024-11-05 ENCOUNTER — LABORATORY RESULT (OUTPATIENT)
Age: 64
End: 2024-11-05

## 2024-11-05 VITALS — TEMPERATURE: 98 F | SYSTOLIC BLOOD PRESSURE: 133 MMHG | DIASTOLIC BLOOD PRESSURE: 76 MMHG | HEART RATE: 76 BPM

## 2024-11-05 VITALS
BODY MASS INDEX: 40.49 KG/M2 | SYSTOLIC BLOOD PRESSURE: 133 MMHG | TEMPERATURE: 98.4 F | WEIGHT: 246 LBS | OXYGEN SATURATION: 98 % | HEART RATE: 76 BPM | RESPIRATION RATE: 17 BRPM | DIASTOLIC BLOOD PRESSURE: 76 MMHG | HEIGHT: 65.5 IN

## 2024-11-05 DIAGNOSIS — C54.1 MALIGNANT NEOPLASM OF ENDOMETRIUM: ICD-10-CM

## 2024-11-05 DIAGNOSIS — Z90.49 ACQUIRED ABSENCE OF OTHER SPECIFIED PARTS OF DIGESTIVE TRACT: Chronic | ICD-10-CM

## 2024-11-05 DIAGNOSIS — Z98.890 OTHER SPECIFIED POSTPROCEDURAL STATES: Chronic | ICD-10-CM

## 2024-11-05 DIAGNOSIS — Z51.11 ENCOUNTER FOR ANTINEOPLASTIC CHEMOTHERAPY: ICD-10-CM

## 2024-11-05 LAB
HCT VFR BLD CALC: 33.6 %
HGB BLD-MCNC: 11.1 G/DL
MCHC RBC-ENTMCNC: 26.1 PG
MCHC RBC-ENTMCNC: 33 G/DL
MCV RBC AUTO: 78.9 FL
PLATELET # BLD AUTO: 251 K/UL
PMV BLD: 10.6 FL
RBC # BLD: 4.26 M/UL
RBC # FLD: 14.1 %
WBC # FLD AUTO: 6.74 K/UL

## 2024-11-05 PROCEDURE — 99215 OFFICE O/P EST HI 40 MIN: CPT

## 2024-11-05 PROCEDURE — G2211 COMPLEX E/M VISIT ADD ON: CPT

## 2024-11-05 PROCEDURE — 80053 COMPREHEN METABOLIC PANEL: CPT

## 2024-11-05 PROCEDURE — 96413 CHEMO IV INFUSION 1 HR: CPT

## 2024-11-05 PROCEDURE — 85027 COMPLETE CBC AUTOMATED: CPT

## 2024-11-05 PROCEDURE — 36415 COLL VENOUS BLD VENIPUNCTURE: CPT

## 2024-11-05 PROCEDURE — 84443 ASSAY THYROID STIM HORMONE: CPT

## 2024-11-05 RX ORDER — DOSTARLIMAB 50 MG/ML
500 INJECTION INTRAVENOUS ONCE
Refills: 0 | Status: COMPLETED | OUTPATIENT
Start: 2024-11-05 | End: 2024-11-05

## 2024-11-05 RX ADMIN — DOSTARLIMAB 500 MILLIGRAM(S): 50 INJECTION INTRAVENOUS at 15:25

## 2024-11-05 RX ADMIN — DOSTARLIMAB 500 MILLIGRAM(S): 50 INJECTION INTRAVENOUS at 14:55

## 2024-11-06 DIAGNOSIS — C54.1 MALIGNANT NEOPLASM OF ENDOMETRIUM: ICD-10-CM

## 2024-11-06 LAB
ALBUMIN SERPL ELPH-MCNC: 4.1 G/DL
ALP BLD-CCNC: 85 U/L
ALT SERPL-CCNC: 11 U/L
ANION GAP SERPL CALC-SCNC: 12 MMOL/L
AST SERPL-CCNC: 10 U/L
BILIRUB SERPL-MCNC: <0.2 MG/DL
BUN SERPL-MCNC: 25 MG/DL
CALCIUM SERPL-MCNC: 9.2 MG/DL
CHLORIDE SERPL-SCNC: 99 MMOL/L
CO2 SERPL-SCNC: 28 MMOL/L
CREAT SERPL-MCNC: 1.3 MG/DL
EGFR: 46 ML/MIN/1.73M2
GLUCOSE SERPL-MCNC: 90 MG/DL
POTASSIUM SERPL-SCNC: 4 MMOL/L
PROT SERPL-MCNC: 6.7 G/DL
SODIUM SERPL-SCNC: 139 MMOL/L
TSH SERPL-ACNC: 0.99 UIU/ML

## 2024-11-26 ENCOUNTER — LABORATORY RESULT (OUTPATIENT)
Age: 64
End: 2024-11-26

## 2024-11-26 ENCOUNTER — OUTPATIENT (OUTPATIENT)
Dept: OUTPATIENT SERVICES | Facility: HOSPITAL | Age: 64
LOS: 1 days | End: 2024-11-26
Payer: COMMERCIAL

## 2024-11-26 ENCOUNTER — APPOINTMENT (OUTPATIENT)
Age: 64
End: 2024-11-26
Payer: COMMERCIAL

## 2024-11-26 VITALS
DIASTOLIC BLOOD PRESSURE: 73 MMHG | SYSTOLIC BLOOD PRESSURE: 146 MMHG | OXYGEN SATURATION: 97 % | WEIGHT: 245.25 LBS | HEIGHT: 65 IN | HEART RATE: 82 BPM | TEMPERATURE: 98.2 F | BODY MASS INDEX: 40.86 KG/M2

## 2024-11-26 DIAGNOSIS — Z90.49 ACQUIRED ABSENCE OF OTHER SPECIFIED PARTS OF DIGESTIVE TRACT: Chronic | ICD-10-CM

## 2024-11-26 DIAGNOSIS — C54.1 MALIGNANT NEOPLASM OF ENDOMETRIUM: ICD-10-CM

## 2024-11-26 DIAGNOSIS — Z98.890 OTHER SPECIFIED POSTPROCEDURAL STATES: Chronic | ICD-10-CM

## 2024-11-26 DIAGNOSIS — Z51.11 ENCOUNTER FOR ANTINEOPLASTIC CHEMOTHERAPY: ICD-10-CM

## 2024-11-26 LAB
HCT VFR BLD CALC: 32.1 %
HGB BLD-MCNC: 10.6 G/DL
MCHC RBC-ENTMCNC: 25.7 PG
MCHC RBC-ENTMCNC: 33 G/DL
MCV RBC AUTO: 77.9 FL
PLATELET # BLD AUTO: 216 K/UL
PMV BLD: 10.9 FL
RBC # BLD: 4.12 M/UL
RBC # FLD: 14.6 %
WBC # FLD AUTO: 6.8 K/UL

## 2024-11-26 PROCEDURE — G2211 COMPLEX E/M VISIT ADD ON: CPT

## 2024-11-26 PROCEDURE — 99215 OFFICE O/P EST HI 40 MIN: CPT

## 2024-11-26 PROCEDURE — 84443 ASSAY THYROID STIM HORMONE: CPT

## 2024-11-26 PROCEDURE — 36415 COLL VENOUS BLD VENIPUNCTURE: CPT

## 2024-11-26 PROCEDURE — 96413 CHEMO IV INFUSION 1 HR: CPT

## 2024-11-26 PROCEDURE — 86304 IMMUNOASSAY TUMOR CA 125: CPT

## 2024-11-26 PROCEDURE — 80053 COMPREHEN METABOLIC PANEL: CPT

## 2024-11-26 PROCEDURE — 83735 ASSAY OF MAGNESIUM: CPT

## 2024-11-26 PROCEDURE — 85027 COMPLETE CBC AUTOMATED: CPT

## 2024-11-26 RX ORDER — DOSTARLIMAB 50 MG/ML
500 INJECTION INTRAVENOUS ONCE
Refills: 0 | Status: COMPLETED | OUTPATIENT
Start: 2024-11-26 | End: 2024-11-26

## 2024-11-26 RX ADMIN — DOSTARLIMAB 500 MILLIGRAM(S): 50 INJECTION INTRAVENOUS at 13:55

## 2024-11-26 RX ADMIN — DOSTARLIMAB 500 MILLIGRAM(S): 50 INJECTION INTRAVENOUS at 14:30

## 2024-11-27 LAB
ALBUMIN SERPL ELPH-MCNC: 4.2 G/DL
ALP BLD-CCNC: 88 U/L
ALT SERPL-CCNC: 11 U/L
ANION GAP SERPL CALC-SCNC: 9 MMOL/L
AST SERPL-CCNC: 11 U/L
BILIRUB SERPL-MCNC: 0.3 MG/DL
BUN SERPL-MCNC: 25 MG/DL
CALCIUM SERPL-MCNC: 9.3 MG/DL
CANCER AG125 SERPL-ACNC: 6 U/ML
CHLORIDE SERPL-SCNC: 100 MMOL/L
CO2 SERPL-SCNC: 29 MMOL/L
CREAT SERPL-MCNC: 1.2 MG/DL
EGFR: 51 ML/MIN/1.73M2
GLUCOSE SERPL-MCNC: 127 MG/DL
MAGNESIUM SERPL-MCNC: 2 MG/DL
POTASSIUM SERPL-SCNC: 3.6 MMOL/L
PROT SERPL-MCNC: 6.3 G/DL
SODIUM SERPL-SCNC: 138 MMOL/L

## 2024-11-29 LAB — TSH SERPL-ACNC: 0.87 UIU/ML

## 2024-12-04 ENCOUNTER — APPOINTMENT (OUTPATIENT)
Dept: GYNECOLOGIC ONCOLOGY | Facility: CLINIC | Age: 64
End: 2024-12-04
Payer: COMMERCIAL

## 2024-12-04 VITALS
DIASTOLIC BLOOD PRESSURE: 80 MMHG | BODY MASS INDEX: 39.99 KG/M2 | HEIGHT: 65 IN | WEIGHT: 240 LBS | SYSTOLIC BLOOD PRESSURE: 152 MMHG

## 2024-12-04 DIAGNOSIS — C54.1 MALIGNANT NEOPLASM OF ENDOMETRIUM: ICD-10-CM

## 2024-12-04 PROCEDURE — 99214 OFFICE O/P EST MOD 30 MIN: CPT

## 2024-12-16 ENCOUNTER — APPOINTMENT (OUTPATIENT)
Age: 64
End: 2024-12-16
Payer: COMMERCIAL

## 2024-12-16 ENCOUNTER — OUTPATIENT (OUTPATIENT)
Dept: OUTPATIENT SERVICES | Facility: HOSPITAL | Age: 64
LOS: 1 days | End: 2024-12-16
Payer: COMMERCIAL

## 2024-12-16 ENCOUNTER — LABORATORY RESULT (OUTPATIENT)
Age: 64
End: 2024-12-16

## 2024-12-16 VITALS
TEMPERATURE: 98.4 F | DIASTOLIC BLOOD PRESSURE: 72 MMHG | WEIGHT: 247 LBS | SYSTOLIC BLOOD PRESSURE: 129 MMHG | OXYGEN SATURATION: 99 % | HEIGHT: 65 IN | BODY MASS INDEX: 41.15 KG/M2 | HEART RATE: 84 BPM | RESPIRATION RATE: 18 BRPM

## 2024-12-16 DIAGNOSIS — Z98.890 OTHER SPECIFIED POSTPROCEDURAL STATES: Chronic | ICD-10-CM

## 2024-12-16 DIAGNOSIS — C54.1 MALIGNANT NEOPLASM OF ENDOMETRIUM: ICD-10-CM

## 2024-12-16 DIAGNOSIS — E78.5 HYPERLIPIDEMIA, UNSPECIFIED: ICD-10-CM

## 2024-12-16 DIAGNOSIS — Z90.49 ACQUIRED ABSENCE OF OTHER SPECIFIED PARTS OF DIGESTIVE TRACT: Chronic | ICD-10-CM

## 2024-12-16 DIAGNOSIS — Z51.11 ENCOUNTER FOR ANTINEOPLASTIC CHEMOTHERAPY: ICD-10-CM

## 2024-12-16 LAB
ALBUMIN SERPL ELPH-MCNC: 4.3 G/DL
ALP BLD-CCNC: 84 U/L
ALT SERPL-CCNC: 13 U/L
ANION GAP SERPL CALC-SCNC: 13 MMOL/L
AST SERPL-CCNC: 11 U/L
BILIRUB SERPL-MCNC: 0.3 MG/DL
BUN SERPL-MCNC: 22 MG/DL
CALCIUM SERPL-MCNC: 9.6 MG/DL
CHLORIDE SERPL-SCNC: 97 MMOL/L
CO2 SERPL-SCNC: 27 MMOL/L
CREAT SERPL-MCNC: 1.1 MG/DL
EGFR: 56 ML/MIN/1.73M2
GLUCOSE SERPL-MCNC: 165 MG/DL
HCT VFR BLD CALC: 32.7 %
HGB BLD-MCNC: 10.7 G/DL
MAGNESIUM SERPL-MCNC: 1.7 MG/DL
MCHC RBC-ENTMCNC: 25.8 PG
MCHC RBC-ENTMCNC: 32.7 G/DL
MCV RBC AUTO: 78.8 FL
PLATELET # BLD AUTO: 245 K/UL
PMV BLD: 10.2 FL
POTASSIUM SERPL-SCNC: 4.2 MMOL/L
PROT SERPL-MCNC: 6.6 G/DL
RBC # BLD: 4.15 M/UL
RBC # FLD: 14.4 %
SODIUM SERPL-SCNC: 137 MMOL/L
TSH SERPL-ACNC: 1.13 UIU/ML
WBC # FLD AUTO: 6.62 K/UL

## 2024-12-16 PROCEDURE — 86304 IMMUNOASSAY TUMOR CA 125: CPT

## 2024-12-16 PROCEDURE — 80053 COMPREHEN METABOLIC PANEL: CPT

## 2024-12-16 PROCEDURE — 84443 ASSAY THYROID STIM HORMONE: CPT

## 2024-12-16 PROCEDURE — G2211 COMPLEX E/M VISIT ADD ON: CPT

## 2024-12-16 PROCEDURE — 99215 OFFICE O/P EST HI 40 MIN: CPT

## 2024-12-16 PROCEDURE — 85027 COMPLETE CBC AUTOMATED: CPT

## 2024-12-16 PROCEDURE — 83735 ASSAY OF MAGNESIUM: CPT

## 2024-12-17 ENCOUNTER — OUTPATIENT (OUTPATIENT)
Dept: OUTPATIENT SERVICES | Facility: HOSPITAL | Age: 64
LOS: 1 days | End: 2024-12-17
Payer: COMMERCIAL

## 2024-12-17 ENCOUNTER — APPOINTMENT (OUTPATIENT)
Age: 64
End: 2024-12-17

## 2024-12-17 VITALS — TEMPERATURE: 97 F | HEART RATE: 74 BPM | SYSTOLIC BLOOD PRESSURE: 123 MMHG | DIASTOLIC BLOOD PRESSURE: 71 MMHG

## 2024-12-17 VITALS
TEMPERATURE: 97.3 F | RESPIRATION RATE: 16 BRPM | DIASTOLIC BLOOD PRESSURE: 71 MMHG | SYSTOLIC BLOOD PRESSURE: 123 MMHG | HEART RATE: 74 BPM

## 2024-12-17 DIAGNOSIS — Z90.49 ACQUIRED ABSENCE OF OTHER SPECIFIED PARTS OF DIGESTIVE TRACT: Chronic | ICD-10-CM

## 2024-12-17 DIAGNOSIS — C54.1 MALIGNANT NEOPLASM OF ENDOMETRIUM: ICD-10-CM

## 2024-12-17 DIAGNOSIS — Z98.890 OTHER SPECIFIED POSTPROCEDURAL STATES: Chronic | ICD-10-CM

## 2024-12-17 LAB — CANCER AG125 SERPL-ACNC: 5 U/ML

## 2024-12-17 PROCEDURE — 96367 TX/PROPH/DG ADDL SEQ IV INF: CPT

## 2024-12-17 PROCEDURE — 96413 CHEMO IV INFUSION 1 HR: CPT

## 2024-12-17 RX ORDER — DOSTARLIMAB 50 MG/ML
500 INJECTION INTRAVENOUS ONCE
Refills: 0 | Status: COMPLETED | OUTPATIENT
Start: 2024-12-17 | End: 2024-12-17

## 2024-12-17 RX ORDER — MAGNESIUM SULFATE 500 MG/ML
2 SYRINGE (ML) INJECTION ONCE
Refills: 0 | Status: COMPLETED | OUTPATIENT
Start: 2024-12-17 | End: 2024-12-17

## 2024-12-17 RX ADMIN — Medication 25 GRAM(S): at 14:13

## 2024-12-17 RX ADMIN — DOSTARLIMAB 500 MILLIGRAM(S): 50 INJECTION INTRAVENOUS at 16:25

## 2024-12-17 RX ADMIN — Medication 2 GRAM(S): at 15:50

## 2024-12-17 RX ADMIN — DOSTARLIMAB 500 MILLIGRAM(S): 50 INJECTION INTRAVENOUS at 15:50

## 2024-12-24 ENCOUNTER — APPOINTMENT (OUTPATIENT)
Age: 64
End: 2024-12-24

## 2024-12-24 ENCOUNTER — OUTPATIENT (OUTPATIENT)
Dept: OUTPATIENT SERVICES | Facility: HOSPITAL | Age: 64
LOS: 1 days | End: 2024-12-24
Payer: COMMERCIAL

## 2024-12-24 DIAGNOSIS — Z98.890 OTHER SPECIFIED POSTPROCEDURAL STATES: Chronic | ICD-10-CM

## 2024-12-24 DIAGNOSIS — Z90.49 ACQUIRED ABSENCE OF OTHER SPECIFIED PARTS OF DIGESTIVE TRACT: Chronic | ICD-10-CM

## 2024-12-24 DIAGNOSIS — C54.1 MALIGNANT NEOPLASM OF ENDOMETRIUM: ICD-10-CM

## 2024-12-24 LAB — MAGNESIUM SERPL-MCNC: 2.1 MG/DL

## 2024-12-24 PROCEDURE — 83735 ASSAY OF MAGNESIUM: CPT

## 2024-12-24 PROCEDURE — 36415 COLL VENOUS BLD VENIPUNCTURE: CPT

## 2025-01-07 ENCOUNTER — LABORATORY RESULT (OUTPATIENT)
Age: 65
End: 2025-01-07

## 2025-01-07 ENCOUNTER — APPOINTMENT (OUTPATIENT)
Age: 65
End: 2025-01-07
Payer: COMMERCIAL

## 2025-01-07 ENCOUNTER — OUTPATIENT (OUTPATIENT)
Dept: OUTPATIENT SERVICES | Facility: HOSPITAL | Age: 65
LOS: 1 days | End: 2025-01-07
Payer: COMMERCIAL

## 2025-01-07 VITALS
HEART RATE: 84 BPM | TEMPERATURE: 98.8 F | HEIGHT: 65 IN | WEIGHT: 250 LBS | DIASTOLIC BLOOD PRESSURE: 75 MMHG | OXYGEN SATURATION: 98 % | RESPIRATION RATE: 16 BRPM | SYSTOLIC BLOOD PRESSURE: 149 MMHG | BODY MASS INDEX: 41.65 KG/M2

## 2025-01-07 VITALS
DIASTOLIC BLOOD PRESSURE: 75 MMHG | TEMPERATURE: 99 F | SYSTOLIC BLOOD PRESSURE: 149 MMHG | RESPIRATION RATE: 16 BRPM | HEART RATE: 84 BPM

## 2025-01-07 DIAGNOSIS — Z29.89 ENCOUNTER. FOR OTHER SPECIFIED PROPHYLACTIC MEASURES: ICD-10-CM

## 2025-01-07 DIAGNOSIS — Z90.49 ACQUIRED ABSENCE OF OTHER SPECIFIED PARTS OF DIGESTIVE TRACT: Chronic | ICD-10-CM

## 2025-01-07 DIAGNOSIS — C54.1 MALIGNANT NEOPLASM OF ENDOMETRIUM: ICD-10-CM

## 2025-01-07 DIAGNOSIS — Z51.11 ENCOUNTER FOR ANTINEOPLASTIC CHEMOTHERAPY: ICD-10-CM

## 2025-01-07 DIAGNOSIS — E78.5 HYPERLIPIDEMIA, UNSPECIFIED: ICD-10-CM

## 2025-01-07 DIAGNOSIS — Z98.890 OTHER SPECIFIED POSTPROCEDURAL STATES: Chronic | ICD-10-CM

## 2025-01-07 LAB
HCT VFR BLD CALC: 34.6 %
HGB BLD-MCNC: 11 G/DL
MCHC RBC-ENTMCNC: 25.3 PG
MCHC RBC-ENTMCNC: 31.8 G/DL
MCV RBC AUTO: 79.7 FL
PLATELET # BLD AUTO: 255 K/UL
PMV BLD: 10.6 FL
RBC # BLD: 4.34 M/UL
RBC # FLD: 14.5 %
WBC # FLD AUTO: 7.99 K/UL

## 2025-01-07 PROCEDURE — 80053 COMPREHEN METABOLIC PANEL: CPT

## 2025-01-07 PROCEDURE — 99204 OFFICE O/P NEW MOD 45 MIN: CPT

## 2025-01-07 PROCEDURE — G2211 COMPLEX E/M VISIT ADD ON: CPT

## 2025-01-07 PROCEDURE — 99214 OFFICE O/P EST MOD 30 MIN: CPT

## 2025-01-07 PROCEDURE — 96413 CHEMO IV INFUSION 1 HR: CPT

## 2025-01-07 PROCEDURE — 85027 COMPLETE CBC AUTOMATED: CPT

## 2025-01-07 PROCEDURE — 83735 ASSAY OF MAGNESIUM: CPT

## 2025-01-07 PROCEDURE — 86304 IMMUNOASSAY TUMOR CA 125: CPT

## 2025-01-07 PROCEDURE — 36415 COLL VENOUS BLD VENIPUNCTURE: CPT

## 2025-01-07 RX ORDER — DOSTARLIMAB 50 MG/ML
500 INJECTION INTRAVENOUS ONCE
Refills: 0 | Status: COMPLETED | OUTPATIENT
Start: 2025-01-07 | End: 2025-01-07

## 2025-01-07 RX ADMIN — DOSTARLIMAB 500 MILLIGRAM(S): 50 INJECTION INTRAVENOUS at 13:17

## 2025-01-07 RX ADMIN — DOSTARLIMAB 500 MILLIGRAM(S): 50 INJECTION INTRAVENOUS at 13:55

## 2025-01-08 DIAGNOSIS — C54.1 MALIGNANT NEOPLASM OF ENDOMETRIUM: ICD-10-CM

## 2025-01-08 LAB
ALBUMIN SERPL ELPH-MCNC: 4 G/DL
ALP BLD-CCNC: 83 U/L
ALT SERPL-CCNC: 12 U/L
ANION GAP SERPL CALC-SCNC: 11 MMOL/L
AST SERPL-CCNC: 13 U/L
BILIRUB SERPL-MCNC: 0.2 MG/DL
BUN SERPL-MCNC: 21 MG/DL
CALCIUM SERPL-MCNC: 9 MG/DL
CANCER AG125 SERPL-ACNC: 4 U/ML
CHLORIDE SERPL-SCNC: 103 MMOL/L
CO2 SERPL-SCNC: 27 MMOL/L
CREAT SERPL-MCNC: 1 MG/DL
EGFR: 63 ML/MIN/1.73M2
GLUCOSE SERPL-MCNC: 175 MG/DL
MAGNESIUM SERPL-MCNC: 1.9 MG/DL
POTASSIUM SERPL-SCNC: 3.7 MMOL/L
PROT SERPL-MCNC: 6.2 G/DL
SODIUM SERPL-SCNC: 141 MMOL/L

## 2025-01-21 ENCOUNTER — OUTPATIENT (OUTPATIENT)
Dept: OUTPATIENT SERVICES | Facility: HOSPITAL | Age: 65
LOS: 1 days | End: 2025-01-21
Payer: COMMERCIAL

## 2025-01-21 ENCOUNTER — APPOINTMENT (OUTPATIENT)
Facility: HOSPITAL | Age: 65
End: 2025-01-21
Payer: COMMERCIAL

## 2025-01-21 VITALS
SYSTOLIC BLOOD PRESSURE: 154 MMHG | WEIGHT: 249 LBS | DIASTOLIC BLOOD PRESSURE: 77 MMHG | HEIGHT: 65 IN | BODY MASS INDEX: 41.48 KG/M2 | TEMPERATURE: 97.9 F | HEART RATE: 88 BPM

## 2025-01-21 DIAGNOSIS — Z01.818 ENCOUNTER FOR OTHER PREPROCEDURAL EXAMINATION: ICD-10-CM

## 2025-01-21 DIAGNOSIS — I10 ESSENTIAL (PRIMARY) HYPERTENSION: ICD-10-CM

## 2025-01-21 DIAGNOSIS — Z98.890 OTHER SPECIFIED POSTPROCEDURAL STATES: Chronic | ICD-10-CM

## 2025-01-21 DIAGNOSIS — Z90.49 ACQUIRED ABSENCE OF OTHER SPECIFIED PARTS OF DIGESTIVE TRACT: Chronic | ICD-10-CM

## 2025-01-21 DIAGNOSIS — Z86.79 PERSONAL HISTORY OF OTHER DISEASES OF THE CIRCULATORY SYSTEM: ICD-10-CM

## 2025-01-21 DIAGNOSIS — I25.10 ATHEROSCLEROTIC HEART DISEASE OF NATIVE CORONARY ARTERY W/OUT ANGINA PECTORIS: ICD-10-CM

## 2025-01-21 DIAGNOSIS — Z51.11 ENCOUNTER FOR ANTINEOPLASTIC CHEMOTHERAPY: ICD-10-CM

## 2025-01-21 DIAGNOSIS — E78.5 HYPERLIPIDEMIA, UNSPECIFIED: ICD-10-CM

## 2025-01-21 PROCEDURE — 93005 ELECTROCARDIOGRAM TRACING: CPT

## 2025-01-21 PROCEDURE — 99214 OFFICE O/P EST MOD 30 MIN: CPT | Mod: 25

## 2025-01-21 PROCEDURE — 93010 ELECTROCARDIOGRAM REPORT: CPT

## 2025-01-22 DIAGNOSIS — I10 ESSENTIAL (PRIMARY) HYPERTENSION: ICD-10-CM

## 2025-01-23 ENCOUNTER — TRANSCRIPTION ENCOUNTER (OUTPATIENT)
Age: 65
End: 2025-01-23

## 2025-01-23 ENCOUNTER — RESULT REVIEW (OUTPATIENT)
Age: 65
End: 2025-01-23

## 2025-01-23 ENCOUNTER — OUTPATIENT (OUTPATIENT)
Dept: OUTPATIENT SERVICES | Facility: HOSPITAL | Age: 65
LOS: 1 days | Discharge: HOME | End: 2025-01-23
Payer: COMMERCIAL

## 2025-01-23 VITALS
HEIGHT: 72 IN | TEMPERATURE: 97 F | RESPIRATION RATE: 18 BRPM | SYSTOLIC BLOOD PRESSURE: 159 MMHG | HEART RATE: 77 BPM | OXYGEN SATURATION: 97 % | DIASTOLIC BLOOD PRESSURE: 68 MMHG | WEIGHT: 240.08 LBS

## 2025-01-23 VITALS — OXYGEN SATURATION: 99 % | SYSTOLIC BLOOD PRESSURE: 110 MMHG | HEART RATE: 83 BPM | RESPIRATION RATE: 18 BRPM

## 2025-01-23 DIAGNOSIS — Z98.890 OTHER SPECIFIED POSTPROCEDURAL STATES: Chronic | ICD-10-CM

## 2025-01-23 DIAGNOSIS — Z90.49 ACQUIRED ABSENCE OF OTHER SPECIFIED PARTS OF DIGESTIVE TRACT: Chronic | ICD-10-CM

## 2025-01-23 DIAGNOSIS — K86.2 CYST OF PANCREAS: ICD-10-CM

## 2025-01-23 PROCEDURE — 88173 CYTOPATH EVAL FNA REPORT: CPT

## 2025-01-23 PROCEDURE — 43239 EGD BIOPSY SINGLE/MULTIPLE: CPT | Mod: XU

## 2025-01-23 PROCEDURE — 43242 EGD US FINE NEEDLE BX/ASPIR: CPT

## 2025-01-23 PROCEDURE — 89051 BODY FLUID CELL COUNT: CPT

## 2025-01-23 PROCEDURE — 88305 TISSUE EXAM BY PATHOLOGIST: CPT | Mod: 26

## 2025-01-23 PROCEDURE — 88305 TISSUE EXAM BY PATHOLOGIST: CPT

## 2025-01-23 PROCEDURE — 88312 SPECIAL STAINS GROUP 1: CPT

## 2025-01-23 PROCEDURE — 82945 GLUCOSE OTHER FLUID: CPT

## 2025-01-23 PROCEDURE — 88312 SPECIAL STAINS GROUP 1: CPT | Mod: 26

## 2025-01-23 PROCEDURE — 82378 CARCINOEMBRYONIC ANTIGEN: CPT

## 2025-01-23 PROCEDURE — 82150 ASSAY OF AMYLASE: CPT

## 2025-01-23 PROCEDURE — 88173 CYTOPATH EVAL FNA REPORT: CPT | Mod: 26

## 2025-01-23 RX ORDER — CIPROFLOXACIN HYDROCHLORIDE 500 MG/1
400 TABLET, FILM COATED ORAL ONCE
Refills: 0 | Status: DISCONTINUED | OUTPATIENT
Start: 2025-01-23 | End: 2025-01-23

## 2025-01-23 RX ORDER — CIPROFLOXACIN HYDROCHLORIDE 500 MG/1
1 TABLET, FILM COATED ORAL
Qty: 5 | Refills: 0
Start: 2025-01-23 | End: 2025-01-27

## 2025-01-23 NOTE — ASU DISCHARGE PLAN (ADULT/PEDIATRIC) - FINANCIAL ASSISTANCE
[facility] provides services at a reduced cost to those who are determined to be eligible through [facility]’s financial assistance program. Information regarding [facility]’s financial assistance program can be found by going to [email or web address] or by calling [phone].

## 2025-01-23 NOTE — ASU PATIENT PROFILE, ADULT - FALL HARM RISK - HARM RISK INTERVENTIONS

## 2025-01-23 NOTE — ASU PATIENT PROFILE, ADULT - FALL HARM RISK - FALL HARM RISK
DATE OF SURGERY: 11/10/2021



PREOPERATIVE DIAGNOSES:

1.  Right hydronephrosis.

2.  Right 7 mm obstructing stone.

3.  Pyuria.



POSTOPERATIVE DIAGNOSES:

1.  Right hydronephrosis.

2.  Right 7 mm obstructing stone.

3.  Pyuria.



PROCEDURES:  Cystoscopy, right retrograde flexible ureteroscopy.



SURGEON:  Dr. Martin.



ANESTHESIA:  General.



FINDINGS:  This is a gentleman who is a paraplegic with clean intermittent 

catheterization and now presents with right hydronephrosis and obstructing 7 mm 

stone.



DESCRIPTION OF PROCEDURE:  The patient was brought to the operating room and 

placed on the operating table.  Following induction of anesthesia, placed in 

lithotomy position, prepped and draped in usual sterile fashion.  He was given 

Ancef and gentamicin.  Retrograde showed moderate hydronephrosis.  It looked 

like the stone was now in the renal pelvis.  We placed a wire in the kidney.  We

had to wait for the second ureteroscope as the first one had some grooves on it 

that would not pass easily.  The second one just passed without any pressure and

then we emptied the renal pelvis, approximately 40 mL was in the renal pelvis, 

which was not cloudy.  The bladder urine was cloudy from the catheterization and

the urethra was quite tight, so we used the 20-[place] sheath.



At this point, the stone was seen, but the patient's breathing was so intense 

and the tidal volumes that we could not get a good shot at the stone in this 

very dilated renal pelvis.  Therefore, we placed a 7-[place] double J.  We did 

not want to over distend the renal pelvis.  The patient tolerated the procedure 

well.  He will need to be paralyzed at the next time and have a tube, so we can 

control his tidal volumes.  At this point, it is probably safer. The ureter was 

quite tight and the urethra was tight, so we left the Rodgers and a double-J 

stent, 7-[place]er 24, brought to recovery in stable condition.  Plan will be 

second stage ureteroscopy within the next 2 to 3 weeks.







DD: 11/10/2021 05:37 PM

DT: 11/10/2021 05:48 PM

TID: [id number] RECEIPT: [id number]

[facility]

## 2025-01-23 NOTE — ASU DISCHARGE PLAN (ADULT/PEDIATRIC) - CARE PROVIDER_API CALL
[facility] Note:  Renal Adjustment for Levaquin (levofloxacin)    Marichuy Stage is a 81year old female who has been prescribed Levaquin (levofloxacin) 750 mg every 24 hrs. CrCl is estimated creatinine clearance is 39.5 mL/min (based on Cr of 0.8).  s Danilo Carroll  Gastroenterology  [address] [place] [address]  Phone: [phone]  Fax: [phone]  Follow Up Time: 1 month

## 2025-01-23 NOTE — CHART NOTE - NSCHARTNOTEFT_GEN_A_CORE
PACU ANESTHESIA ADMISSION NOTE    Procedure:   Post op diagnosis:      ____  Intubated  TV:______       Rate: ______      FiO2: ______  __x__  Patent Airway  __x__  Full return of protective reflexes  _x___  Full recovery from anesthesia / back to baseline     Vitals:       Sat:     100                BP:        122/56            R:         16   P: 86  T:       36      Mental Status:    _x___ Awake    ____ Alert     ____ Drowsy    ____ Sedated    Nausea/Vomiting:   __x__ NO    ____ Yes,   See Post - Op Orders          Pain Scale (0-10):    ____ Treatment:   _x___ None      ____ See Post - Op/PCA Orders    Post - Operative Fluids:    ____ Oral     __x__ See Post - Op Orders    Plan: Discharge:     ___x__Home         ____Floor       _____Critical Care      _____  Other:_________________    Comments: no complications

## 2025-01-24 LAB
AMYLASE FLD-CCNC: 390 U/L — SIGNIFICANT CHANGE UP
B PERT IGG+IGM PNL SER: CLEAR — SIGNIFICANT CHANGE UP
COLOR FLD: SIGNIFICANT CHANGE UP
FLUID INTAKE SUBSTANCE CLASS: SIGNIFICANT CHANGE UP
GLUCOSE FLD-MCNC: <2 MG/DL — SIGNIFICANT CHANGE UP
NEUTROPHILS-BODY FLUID: SIGNIFICANT CHANGE UP
RCV VOL RI: 2 CELLS/UL — SIGNIFICANT CHANGE UP
SPECIMEN SOURCE FLD: SIGNIFICANT CHANGE UP
TOTAL NUCLEATED CELL COUNT, BODY FLUID: 1 CELLS/UL — SIGNIFICANT CHANGE UP
TUBE TYPE: SIGNIFICANT CHANGE UP

## 2025-01-26 LAB
CEA FLD-MCNC: 4 NG/ML — SIGNIFICANT CHANGE UP
SPECIMEN SOURCE: SIGNIFICANT CHANGE UP

## 2025-01-27 PROBLEM — D63.0 ANEMIA IN NEOPLASTIC DISEASE: Status: ACTIVE | Noted: 2025-01-27

## 2025-01-28 ENCOUNTER — OUTPATIENT (OUTPATIENT)
Dept: OUTPATIENT SERVICES | Facility: HOSPITAL | Age: 65
LOS: 1 days | End: 2025-01-28
Payer: COMMERCIAL

## 2025-01-28 ENCOUNTER — APPOINTMENT (OUTPATIENT)
Age: 65
End: 2025-01-28
Payer: COMMERCIAL

## 2025-01-28 ENCOUNTER — LABORATORY RESULT (OUTPATIENT)
Age: 65
End: 2025-01-28

## 2025-01-28 VITALS
OXYGEN SATURATION: 97 % | HEIGHT: 65 IN | DIASTOLIC BLOOD PRESSURE: 75 MMHG | SYSTOLIC BLOOD PRESSURE: 136 MMHG | BODY MASS INDEX: 40.82 KG/M2 | HEART RATE: 83 BPM | TEMPERATURE: 98.4 F | WEIGHT: 245 LBS

## 2025-01-28 VITALS — HEART RATE: 83 BPM | DIASTOLIC BLOOD PRESSURE: 75 MMHG | SYSTOLIC BLOOD PRESSURE: 136 MMHG | TEMPERATURE: 98 F

## 2025-01-28 DIAGNOSIS — C54.1 MALIGNANT NEOPLASM OF ENDOMETRIUM: ICD-10-CM

## 2025-01-28 DIAGNOSIS — D63.0 ANEMIA IN NEOPLASTIC DISEASE: ICD-10-CM

## 2025-01-28 DIAGNOSIS — Z29.89 ENCOUNTER. FOR OTHER SPECIFIED PROPHYLACTIC MEASURES: ICD-10-CM

## 2025-01-28 DIAGNOSIS — Z98.890 OTHER SPECIFIED POSTPROCEDURAL STATES: Chronic | ICD-10-CM

## 2025-01-28 DIAGNOSIS — Z90.49 ACQUIRED ABSENCE OF OTHER SPECIFIED PARTS OF DIGESTIVE TRACT: Chronic | ICD-10-CM

## 2025-01-28 DIAGNOSIS — G62.9 POLYNEUROPATHY, UNSPECIFIED: ICD-10-CM

## 2025-01-28 LAB
NON-GYNECOLOGICAL CYTOLOGY STUDY: SIGNIFICANT CHANGE UP
SURGICAL PATHOLOGY STUDY: SIGNIFICANT CHANGE UP

## 2025-01-28 PROCEDURE — 96413 CHEMO IV INFUSION 1 HR: CPT

## 2025-01-28 PROCEDURE — 99214 OFFICE O/P EST MOD 30 MIN: CPT

## 2025-01-28 PROCEDURE — 80053 COMPREHEN METABOLIC PANEL: CPT

## 2025-01-28 PROCEDURE — G2211 COMPLEX E/M VISIT ADD ON: CPT

## 2025-01-28 PROCEDURE — 85027 COMPLETE CBC AUTOMATED: CPT

## 2025-01-28 PROCEDURE — 36415 COLL VENOUS BLD VENIPUNCTURE: CPT

## 2025-01-28 RX ORDER — METFORMIN HYDROCHLORIDE 1000 MG/1
1000 TABLET, COATED ORAL
Refills: 0 | Status: ACTIVE | COMMUNITY

## 2025-01-28 RX ORDER — OMEGA-3/DHA/EPA/FISH OIL 300-1000MG
CAPSULE ORAL
Refills: 0 | Status: ACTIVE | COMMUNITY

## 2025-01-28 RX ORDER — CHROMIUM 200 MCG
1000 TABLET ORAL
Refills: 0 | Status: ACTIVE | COMMUNITY

## 2025-01-28 RX ORDER — LATANOPROST/PF 0.005 %
0.01 DROPS OPHTHALMIC (EYE)
Refills: 0 | Status: ACTIVE | COMMUNITY

## 2025-01-28 RX ORDER — DOSTARLIMAB 50 MG/ML
500 INJECTION INTRAVENOUS ONCE
Refills: 0 | Status: COMPLETED | OUTPATIENT
Start: 2025-01-28 | End: 2025-01-28

## 2025-01-28 RX ADMIN — DOSTARLIMAB 500 MILLIGRAM(S): 50 INJECTION INTRAVENOUS at 13:38

## 2025-01-28 RX ADMIN — DOSTARLIMAB 500 MILLIGRAM(S): 50 INJECTION INTRAVENOUS at 13:07

## 2025-01-29 DIAGNOSIS — E66.812 OBESITY, CLASS 2: ICD-10-CM

## 2025-01-29 DIAGNOSIS — K29.80 DUODENITIS WITHOUT BLEEDING: ICD-10-CM

## 2025-01-29 DIAGNOSIS — Z79.84 LONG TERM (CURRENT) USE OF ORAL HYPOGLYCEMIC DRUGS: ICD-10-CM

## 2025-01-29 DIAGNOSIS — K31.7 POLYP OF STOMACH AND DUODENUM: ICD-10-CM

## 2025-01-29 DIAGNOSIS — E78.00 PURE HYPERCHOLESTEROLEMIA, UNSPECIFIED: ICD-10-CM

## 2025-01-29 DIAGNOSIS — I10 ESSENTIAL (PRIMARY) HYPERTENSION: ICD-10-CM

## 2025-01-29 DIAGNOSIS — Z87.891 PERSONAL HISTORY OF NICOTINE DEPENDENCE: ICD-10-CM

## 2025-01-29 DIAGNOSIS — K86.2 CYST OF PANCREAS: ICD-10-CM

## 2025-01-29 DIAGNOSIS — E11.9 TYPE 2 DIABETES MELLITUS WITHOUT COMPLICATIONS: ICD-10-CM

## 2025-01-29 DIAGNOSIS — K29.50 UNSPECIFIED CHRONIC GASTRITIS WITHOUT BLEEDING: ICD-10-CM

## 2025-01-29 DIAGNOSIS — R93.3 ABNORMAL FINDINGS ON DIAGNOSTIC IMAGING OF OTHER PARTS OF DIGESTIVE TRACT: ICD-10-CM

## 2025-01-29 LAB
ALBUMIN SERPL ELPH-MCNC: 4.2 G/DL
ALP BLD-CCNC: 89 U/L
ALT SERPL-CCNC: 11 U/L
AST SERPL-CCNC: 11 U/L
BILIRUB SERPL-MCNC: 0.2 MG/DL
BUN SERPL-MCNC: 23 MG/DL
CALCIUM SERPL-MCNC: 9 MG/DL
CHLORIDE SERPL-SCNC: 100 MMOL/L
CO2 SERPL-SCNC: 26 MMOL/L
CREAT SERPL-MCNC: 1 MG/DL
EGFR: 63 ML/MIN/1.73M2
GLUCOSE SERPL-MCNC: 156 MG/DL
HCT VFR BLD CALC: 34.3 %
HGB BLD-MCNC: 11.1 G/DL
MCHC RBC-ENTMCNC: 25.8 PG
MCHC RBC-ENTMCNC: 32.4 G/DL
MCV RBC AUTO: 79.6 FL
PLATELET # BLD AUTO: 217 K/UL
PMV BLD: 10.8 FL
POTASSIUM SERPL-SCNC: 3.7 MMOL/L
PROT SERPL-MCNC: 6.4 G/DL
RBC # BLD: 4.31 M/UL
RBC # FLD: 14.6 %
SODIUM SERPL-SCNC: 141 MMOL/L
WBC # FLD AUTO: 7.66 K/UL

## 2025-02-18 ENCOUNTER — APPOINTMENT (OUTPATIENT)
Age: 65
End: 2025-02-18
Payer: COMMERCIAL

## 2025-02-18 ENCOUNTER — OUTPATIENT (OUTPATIENT)
Dept: OUTPATIENT SERVICES | Facility: HOSPITAL | Age: 65
LOS: 1 days | End: 2025-02-18
Payer: COMMERCIAL

## 2025-02-18 VITALS
OXYGEN SATURATION: 99 % | SYSTOLIC BLOOD PRESSURE: 144 MMHG | DIASTOLIC BLOOD PRESSURE: 76 MMHG | TEMPERATURE: 98 F | HEIGHT: 65 IN | HEART RATE: 86 BPM | WEIGHT: 251 LBS | BODY MASS INDEX: 41.82 KG/M2 | RESPIRATION RATE: 17 BRPM

## 2025-02-18 DIAGNOSIS — Z98.890 OTHER SPECIFIED POSTPROCEDURAL STATES: Chronic | ICD-10-CM

## 2025-02-18 DIAGNOSIS — C54.1 MALIGNANT NEOPLASM OF ENDOMETRIUM: ICD-10-CM

## 2025-02-18 DIAGNOSIS — Z90.49 ACQUIRED ABSENCE OF OTHER SPECIFIED PARTS OF DIGESTIVE TRACT: Chronic | ICD-10-CM

## 2025-02-18 DIAGNOSIS — Z51.11 ENCOUNTER FOR ANTINEOPLASTIC CHEMOTHERAPY: ICD-10-CM

## 2025-02-18 LAB
AUTO BASOPHILS #: 0.06 K/UL
AUTO BASOPHILS %: 0.7 %
AUTO EOSINOPHILS #: 0.3 K/UL
AUTO EOSINOPHILS %: 3.6 %
AUTO IMMATURE GRANULOCYTES #: 0.31 K/UL
AUTO LYMPHOCYTES #: 2.09 K/UL
AUTO LYMPHOCYTES %: 25.4 %
AUTO MONOCYTES #: 0.52 K/UL
AUTO MONOCYTES %: 6.3 %
AUTO NEUTROPHILS #: 4.96 K/UL
AUTO NEUTROPHILS %: 60.2 %
AUTO NRBC #: 0.02 K/UL
HCT VFR BLD CALC: 33 %
HGB BLD-MCNC: 10.7 G/DL
IMM GRANULOCYTES NFR BLD AUTO: 3.8 %
MAN DIFF?: NORMAL
MCHC RBC-ENTMCNC: 26 PG
MCHC RBC-ENTMCNC: 32.4 G/DL
MCV RBC AUTO: 80.1 FL
PLATELET # BLD AUTO: 255 K/UL
PMV BLD AUTO: 0 /100 WBCS
PMV BLD: 10.4 FL
RBC # BLD: 4.12 M/UL
RBC # FLD: 14.6 %
WBC # FLD AUTO: 8.24 K/UL

## 2025-02-18 PROCEDURE — 84436 ASSAY OF TOTAL THYROXINE: CPT

## 2025-02-18 PROCEDURE — 85025 COMPLETE CBC W/AUTO DIFF WBC: CPT

## 2025-02-18 PROCEDURE — 86304 IMMUNOASSAY TUMOR CA 125: CPT

## 2025-02-18 PROCEDURE — 36415 COLL VENOUS BLD VENIPUNCTURE: CPT

## 2025-02-18 PROCEDURE — G2211 COMPLEX E/M VISIT ADD ON: CPT

## 2025-02-18 PROCEDURE — 96413 CHEMO IV INFUSION 1 HR: CPT

## 2025-02-18 PROCEDURE — 84443 ASSAY THYROID STIM HORMONE: CPT

## 2025-02-18 PROCEDURE — 99215 OFFICE O/P EST HI 40 MIN: CPT

## 2025-02-18 PROCEDURE — 80053 COMPREHEN METABOLIC PANEL: CPT

## 2025-02-18 PROCEDURE — 83735 ASSAY OF MAGNESIUM: CPT

## 2025-02-18 RX ORDER — DOSTARLIMAB 50 MG/ML
500 INJECTION INTRAVENOUS ONCE
Refills: 0 | Status: COMPLETED | OUTPATIENT
Start: 2025-02-18 | End: 2025-02-18

## 2025-02-18 RX ADMIN — DOSTARLIMAB 500 MILLIGRAM(S): 50 INJECTION INTRAVENOUS at 13:38

## 2025-02-19 DIAGNOSIS — C54.1 MALIGNANT NEOPLASM OF ENDOMETRIUM: ICD-10-CM

## 2025-02-19 LAB
ALBUMIN SERPL ELPH-MCNC: 4.4 G/DL
ALP BLD-CCNC: 96 U/L
ALT SERPL-CCNC: 13 U/L
ANION GAP SERPL CALC-SCNC: 8 MMOL/L
AST SERPL-CCNC: 11 U/L
BILIRUB SERPL-MCNC: 0.3 MG/DL
BUN SERPL-MCNC: 24 MG/DL
CALCIUM SERPL-MCNC: 9.1 MG/DL
CANCER AG125 SERPL-ACNC: 5 U/ML
CHLORIDE SERPL-SCNC: 101 MMOL/L
CO2 SERPL-SCNC: 31 MMOL/L
CREAT SERPL-MCNC: 1 MG/DL
EGFR: 63 ML/MIN/1.73M2
GLUCOSE SERPL-MCNC: 117 MG/DL
MAGNESIUM SERPL-MCNC: 2.2 MG/DL
POTASSIUM SERPL-SCNC: 3.9 MMOL/L
PROT SERPL-MCNC: 6.5 G/DL
SODIUM SERPL-SCNC: 140 MMOL/L
T4 SERPL-MCNC: 8.7 UG/DL
TSH SERPL-ACNC: 0.95 UIU/ML

## 2025-03-11 ENCOUNTER — APPOINTMENT (OUTPATIENT)
Age: 65
End: 2025-03-11
Payer: COMMERCIAL

## 2025-03-11 ENCOUNTER — APPOINTMENT (OUTPATIENT)
Dept: GYNECOLOGIC ONCOLOGY | Facility: CLINIC | Age: 65
End: 2025-03-11
Payer: COMMERCIAL

## 2025-03-11 ENCOUNTER — OUTPATIENT (OUTPATIENT)
Dept: OUTPATIENT SERVICES | Facility: HOSPITAL | Age: 65
LOS: 1 days | End: 2025-03-11
Payer: COMMERCIAL

## 2025-03-11 VITALS
BODY MASS INDEX: 42.15 KG/M2 | RESPIRATION RATE: 17 BRPM | TEMPERATURE: 98.4 F | HEIGHT: 65 IN | SYSTOLIC BLOOD PRESSURE: 123 MMHG | OXYGEN SATURATION: 97 % | WEIGHT: 253 LBS | DIASTOLIC BLOOD PRESSURE: 76 MMHG | HEART RATE: 79 BPM

## 2025-03-11 VITALS — SYSTOLIC BLOOD PRESSURE: 123 MMHG | TEMPERATURE: 98 F | DIASTOLIC BLOOD PRESSURE: 76 MMHG | HEART RATE: 79 BPM

## 2025-03-11 VITALS
SYSTOLIC BLOOD PRESSURE: 132 MMHG | DIASTOLIC BLOOD PRESSURE: 73 MMHG | WEIGHT: 253 LBS | HEART RATE: 82 BPM | HEIGHT: 65 IN | BODY MASS INDEX: 42.15 KG/M2

## 2025-03-11 DIAGNOSIS — Z01.812 ENCOUNTER FOR PREPROCEDURAL LABORATORY EXAMINATION: ICD-10-CM

## 2025-03-11 DIAGNOSIS — Z98.890 OTHER SPECIFIED POSTPROCEDURAL STATES: Chronic | ICD-10-CM

## 2025-03-11 DIAGNOSIS — Z01.818 ENCOUNTER FOR OTHER PREPROCEDURAL EXAMINATION: ICD-10-CM

## 2025-03-11 DIAGNOSIS — C54.1 MALIGNANT NEOPLASM OF ENDOMETRIUM: ICD-10-CM

## 2025-03-11 DIAGNOSIS — Z51.11 ENCOUNTER FOR ANTINEOPLASTIC CHEMOTHERAPY: ICD-10-CM

## 2025-03-11 DIAGNOSIS — Z90.49 ACQUIRED ABSENCE OF OTHER SPECIFIED PARTS OF DIGESTIVE TRACT: Chronic | ICD-10-CM

## 2025-03-11 LAB
ALBUMIN SERPL ELPH-MCNC: 4.3 G/DL
ALP BLD-CCNC: 82 U/L
ALT SERPL-CCNC: 14 U/L
ANION GAP SERPL CALC-SCNC: 15 MMOL/L
AST SERPL-CCNC: 14 U/L
AUTO BASOPHILS #: 0.04 K/UL
AUTO BASOPHILS %: 0.5 %
AUTO EOSINOPHILS #: 0.31 K/UL
AUTO EOSINOPHILS %: 4 %
AUTO IMMATURE GRANULOCYTES #: 0.03 K/UL
AUTO LYMPHOCYTES #: 1.88 K/UL
AUTO LYMPHOCYTES %: 24 %
AUTO MONOCYTES #: 0.63 K/UL
AUTO MONOCYTES %: 8.1 %
AUTO NEUTROPHILS #: 4.93 K/UL
AUTO NEUTROPHILS %: 63 %
AUTO NRBC #: 0 K/UL
BILIRUB SERPL-MCNC: 0.3 MG/DL
BUN SERPL-MCNC: 28 MG/DL
CALCIUM SERPL-MCNC: 9.6 MG/DL
CHLORIDE SERPL-SCNC: 101 MMOL/L
CO2 SERPL-SCNC: 26 MMOL/L
CREAT SERPL-MCNC: 1.2 MG/DL
EGFRCR SERPLBLD CKD-EPI 2021: 51 ML/MIN/1.73M2
GLUCOSE SERPL-MCNC: 120 MG/DL
HCT VFR BLD CALC: 34.1 %
HGB BLD-MCNC: 10.7 G/DL
IMM GRANULOCYTES NFR BLD AUTO: 0.4 %
IRON SATN MFR SERPL: 15 %
IRON SERPL-MCNC: 43 UG/DL
LDH SERPL-CCNC: 149
MAGNESIUM SERPL-MCNC: 2 MG/DL
MAN DIFF?: NORMAL
MCHC RBC-ENTMCNC: 25.5 PG
MCHC RBC-ENTMCNC: 31.4 G/DL
MCV RBC AUTO: 81.2 FL
PLATELET # BLD AUTO: 233 K/UL
PMV BLD AUTO: 0 /100 WBCS
PMV BLD: 9.8 FL
POTASSIUM SERPL-SCNC: 4.1 MMOL/L
PROT SERPL-MCNC: 6.7 G/DL
RBC # BLD: 4.2 M/UL
RBC # FLD: 14.9 %
SODIUM SERPL-SCNC: 142 MMOL/L
TIBC SERPL-MCNC: 289 UG/DL
UIBC SERPL-MCNC: 246 UG/DL
WBC # FLD AUTO: 7.82 K/UL

## 2025-03-11 PROCEDURE — 86304 IMMUNOASSAY TUMOR CA 125: CPT

## 2025-03-11 PROCEDURE — 82607 VITAMIN B-12: CPT

## 2025-03-11 PROCEDURE — 99215 OFFICE O/P EST HI 40 MIN: CPT

## 2025-03-11 PROCEDURE — 83550 IRON BINDING TEST: CPT

## 2025-03-11 PROCEDURE — 84443 ASSAY THYROID STIM HORMONE: CPT

## 2025-03-11 PROCEDURE — 96413 CHEMO IV INFUSION 1 HR: CPT

## 2025-03-11 PROCEDURE — 82746 ASSAY OF FOLIC ACID SERUM: CPT

## 2025-03-11 PROCEDURE — 80053 COMPREHEN METABOLIC PANEL: CPT

## 2025-03-11 PROCEDURE — 82728 ASSAY OF FERRITIN: CPT

## 2025-03-11 PROCEDURE — G2211 COMPLEX E/M VISIT ADD ON: CPT | Mod: NC

## 2025-03-11 PROCEDURE — 85025 COMPLETE CBC W/AUTO DIFF WBC: CPT

## 2025-03-11 PROCEDURE — 84439 ASSAY OF FREE THYROXINE: CPT

## 2025-03-11 PROCEDURE — 83735 ASSAY OF MAGNESIUM: CPT

## 2025-03-11 PROCEDURE — 99214 OFFICE O/P EST MOD 30 MIN: CPT

## 2025-03-11 PROCEDURE — 83615 LACTATE (LD) (LDH) ENZYME: CPT

## 2025-03-11 PROCEDURE — 83540 ASSAY OF IRON: CPT

## 2025-03-11 RX ORDER — DOSTARLIMAB 50 MG/ML
500 INJECTION INTRAVENOUS ONCE
Refills: 0 | Status: COMPLETED | OUTPATIENT
Start: 2025-03-11 | End: 2025-03-11

## 2025-03-11 RX ADMIN — DOSTARLIMAB 500 MILLIGRAM(S): 50 INJECTION INTRAVENOUS at 11:22

## 2025-03-11 RX ADMIN — DOSTARLIMAB 500 MILLIGRAM(S): 50 INJECTION INTRAVENOUS at 11:52

## 2025-03-12 DIAGNOSIS — C54.1 MALIGNANT NEOPLASM OF ENDOMETRIUM: ICD-10-CM

## 2025-03-12 LAB
CANCER AG125 SERPL-ACNC: 5 U/ML
FERRITIN SERPL-MCNC: 70 NG/ML
FOLATE SERPL-MCNC: 11.9 NG/ML
T4 FREE SERPL-MCNC: 1.3 NG/DL
TSH SERPL-ACNC: 1.36 UIU/ML
VIT B12 SERPL-MCNC: 181 PG/ML

## 2025-03-13 ENCOUNTER — APPOINTMENT (OUTPATIENT)
Age: 65
End: 2025-03-13

## 2025-03-13 ENCOUNTER — OUTPATIENT (OUTPATIENT)
Dept: OUTPATIENT SERVICES | Facility: HOSPITAL | Age: 65
LOS: 1 days | End: 2025-03-13
Payer: COMMERCIAL

## 2025-03-13 DIAGNOSIS — Z98.890 OTHER SPECIFIED POSTPROCEDURAL STATES: Chronic | ICD-10-CM

## 2025-03-13 DIAGNOSIS — C54.1 MALIGNANT NEOPLASM OF ENDOMETRIUM: ICD-10-CM

## 2025-03-13 DIAGNOSIS — Z90.49 ACQUIRED ABSENCE OF OTHER SPECIFIED PARTS OF DIGESTIVE TRACT: Chronic | ICD-10-CM

## 2025-03-13 PROCEDURE — 96372 THER/PROPH/DIAG INJ SC/IM: CPT

## 2025-03-13 RX ORDER — CYANOCOBALAMIN 1000 UG/ML
1000 INJECTION INTRAMUSCULAR; SUBCUTANEOUS ONCE
Refills: 0 | Status: COMPLETED | OUTPATIENT
Start: 2025-03-13 | End: 2025-03-13

## 2025-03-13 RX ADMIN — CYANOCOBALAMIN 1000 MICROGRAM(S): 1000 INJECTION INTRAMUSCULAR; SUBCUTANEOUS at 16:38

## 2025-03-18 ENCOUNTER — TRANSCRIPTION ENCOUNTER (OUTPATIENT)
Age: 65
End: 2025-03-18

## 2025-03-18 ENCOUNTER — OUTPATIENT (OUTPATIENT)
Dept: OUTPATIENT SERVICES | Facility: HOSPITAL | Age: 65
LOS: 1 days | Discharge: HOME | End: 2025-03-18
Payer: COMMERCIAL

## 2025-03-18 ENCOUNTER — RESULT REVIEW (OUTPATIENT)
Age: 65
End: 2025-03-18

## 2025-03-18 VITALS
RESPIRATION RATE: 18 BRPM | HEART RATE: 81 BPM | TEMPERATURE: 98 F | OXYGEN SATURATION: 95 % | DIASTOLIC BLOOD PRESSURE: 68 MMHG | SYSTOLIC BLOOD PRESSURE: 126 MMHG

## 2025-03-18 VITALS
HEART RATE: 80 BPM | WEIGHT: 250 LBS | OXYGEN SATURATION: 95 % | DIASTOLIC BLOOD PRESSURE: 63 MMHG | RESPIRATION RATE: 18 BRPM | SYSTOLIC BLOOD PRESSURE: 123 MMHG | TEMPERATURE: 98 F | HEIGHT: 64 IN

## 2025-03-18 DIAGNOSIS — Z98.890 OTHER SPECIFIED POSTPROCEDURAL STATES: Chronic | ICD-10-CM

## 2025-03-18 DIAGNOSIS — C54.1 MALIGNANT NEOPLASM OF ENDOMETRIUM: ICD-10-CM

## 2025-03-18 DIAGNOSIS — Z90.49 ACQUIRED ABSENCE OF OTHER SPECIFIED PARTS OF DIGESTIVE TRACT: Chronic | ICD-10-CM

## 2025-03-18 LAB
APTT BLD: 27 SEC — SIGNIFICANT CHANGE UP (ref 27–39.2)
GLUCOSE BLDC GLUCOMTR-MCNC: 126 MG/DL — HIGH (ref 70–99)
INR BLD: 0.98 RATIO — SIGNIFICANT CHANGE UP (ref 0.65–1.3)
PROTHROM AB SERPL-ACNC: 11.6 SEC — SIGNIFICANT CHANGE UP (ref 9.95–12.87)

## 2025-03-18 PROCEDURE — 36598 INJ W/FLUOR EVAL CV DEVICE: CPT | Mod: RT

## 2025-03-18 PROCEDURE — 85730 THROMBOPLASTIN TIME PARTIAL: CPT

## 2025-03-18 PROCEDURE — 82962 GLUCOSE BLOOD TEST: CPT

## 2025-03-18 PROCEDURE — 36415 COLL VENOUS BLD VENIPUNCTURE: CPT

## 2025-03-18 PROCEDURE — 85610 PROTHROMBIN TIME: CPT

## 2025-03-18 NOTE — PROGRESS NOTE ADULT - SUBJECTIVE AND OBJECTIVE BOX
PROCEDURE: Central venous access catheter check    Procedural Personnel  Attending physician(s): HOLLY CRUZ [phone]  Fellow physician(s): None  Resident physician(s): None  Advanced practice provider(s): None    Pre-procedure diagnosis: Unable to access port during chemotherapy session.    Post-procedure diagnosis: Same  Additional clinical history: None    Complications: No immediate complications.    IMPRESSION:    Catheter check of right-sided subcutaneous port demonstrates the catheter to be functioning appropriately. The tip is in the expected location of the right atrium.  The catheter was not flipped.    Plan:     Ok to use port.  If issues arise feel free to call me to help access.  If still issues then can revise.    _______________________________________________________________    PROCEDURE SUMMARY:  - Fluoroscopic evaluation of indwelling central venous access device  - Contrast injection of device: Performed as described below  - Additional procedure(s): None    PROCEDURE DETAILS:    Pre-procedure  Consent: Informed consent for the procedure including risks, benefits and alternatives was obtained and time-out was performed prior to the procedure.  Preparation: Following cutaneous antisepsis, sterile technique was used to access the subcutaneous port.    Anesthesia/sedation  Level of anesthesia/sedation: Monitored anesthesia care  Anesthesia/sedation administered by: Anesthesiology    Fluoroscopic evaluation  Fluoroscopic evaluation of the indwelling central venous access device was performed.  Findings: Normal course of the catheter without evidence of kink or defect    Contrast injection  The indwelling central venous access device was injected with contrast under fluoroscopy and a permanent image was stored.   Findings: Patent catheter without evidence of leakage or fibrin sheath  Catheter flush: Heparin (100 units/mL)    Closure  The catheter was de-accessed. A sterile dressing was applied.    Contrast  Contrast volume (mL): 10    Additional Details  Additional description of procedure: None  Equipment details: None  Specimens removed: None  Estimated blood loss (mL): Less than 10

## 2025-03-18 NOTE — ASU DISCHARGE PLAN (ADULT/PEDIATRIC) - NS MD DC FALL RISK RISK
For information on Fall & Injury Prevention, visit: [email or web address] OR  [email or web address] OR  https://www.cdc.gov/steadi/patient.html

## 2025-03-18 NOTE — ASU PATIENT PROFILE, ADULT - FALL HARM RISK - UNIVERSAL INTERVENTIONS
Bed in lowest position, wheels locked, appropriate side rails in place/Call bell, personal items and telephone in reach/Instruct patient to call for assistance before getting out of bed or chair/Non-slip footwear when patient is out of bed/[place] to call system/Physically safe environment - no spills, clutter or unnecessary equipment/Purposeful Proactive Rounding/Room/bathroom lighting operational, light cord in reach

## 2025-03-18 NOTE — PROGRESS NOTE ADULT - SUBJECTIVE AND OBJECTIVE BOX
Interventional Radiology Outpatient Documentation    PREOPERATIVE DAY OF PROCEDURE EVALUATION:     I have personally seen and examined this patient. I agree with the history and physical which I have reviewed and noted any changes below:     Plan is for right chest port check, possible revision with anesthesia. (Signed electronically) 03-18-25 @ 08:34     Procedure/ risks/ benefits/ goals/ alternatives were explained. All questions answered. Informed content obtained from patient. Consent placed in chart.

## 2025-03-18 NOTE — ASU DISCHARGE PLAN (ADULT/PEDIATRIC) - FINANCIAL ASSISTANCE
[facility] provides services at a reduced cost to those who are determined to be eligible through [facility]’s financial assistance program. Information regarding [facility]’s financial assistance program can be found by going to [email or web address] or by calling [phone].

## 2025-03-18 NOTE — CHART NOTE - NSCHARTNOTEFT_GEN_A_CORE
PACU ANESTHESIA ADMISSION NOTE      Procedure:   Post op diagnosis:      ____  Intubated  TV:______       Rate: ______      FiO2: ______    _x___  Patent Airway    _x___  Full return of protective reflexes    x____  Full recovery from anesthesia / back to baseline     Vitals:   T: 36          R:   11               BP:  112/74                Sat:     97              P: 75      Mental Status:  __x__ Awake   ___x__ Alert   _____ Drowsy   _____ Sedated    Nausea/Vomiting:  __x__ NO  ______Yes,   See Post - Op Orders          Pain Scale (0-10):  _x____    Treatment: ____ None    ____ See Post - Op/PCA Orders    Post - Operative Fluids:   _x___ Oral   ____ See Post - Op Orders    Plan: Discharge:   x____Home       _____Floor     _____Critical Care    _____  Other:_________________    Comments:

## 2025-03-21 ENCOUNTER — RX RENEWAL (OUTPATIENT)
Age: 65
End: 2025-03-21

## 2025-03-21 ENCOUNTER — OUTPATIENT (OUTPATIENT)
Dept: OUTPATIENT SERVICES | Facility: HOSPITAL | Age: 65
LOS: 1 days | End: 2025-03-21
Payer: COMMERCIAL

## 2025-03-21 ENCOUNTER — APPOINTMENT (OUTPATIENT)
Age: 65
End: 2025-03-21

## 2025-03-21 DIAGNOSIS — C54.1 MALIGNANT NEOPLASM OF ENDOMETRIUM: ICD-10-CM

## 2025-03-21 DIAGNOSIS — Z98.890 OTHER SPECIFIED POSTPROCEDURAL STATES: Chronic | ICD-10-CM

## 2025-03-21 DIAGNOSIS — Z90.49 ACQUIRED ABSENCE OF OTHER SPECIFIED PARTS OF DIGESTIVE TRACT: Chronic | ICD-10-CM

## 2025-03-21 PROCEDURE — 96372 THER/PROPH/DIAG INJ SC/IM: CPT

## 2025-03-21 RX ORDER — CYANOCOBALAMIN 1000 UG/ML
1000 INJECTION INTRAMUSCULAR; SUBCUTANEOUS ONCE
Refills: 0 | Status: COMPLETED | OUTPATIENT
Start: 2025-03-21 | End: 2025-03-21

## 2025-03-21 RX ADMIN — CYANOCOBALAMIN 1000 MICROGRAM(S): 1000 INJECTION INTRAMUSCULAR; SUBCUTANEOUS at 17:02

## 2025-03-28 ENCOUNTER — OUTPATIENT (OUTPATIENT)
Dept: OUTPATIENT SERVICES | Facility: HOSPITAL | Age: 65
LOS: 1 days | End: 2025-03-28
Payer: COMMERCIAL

## 2025-03-28 ENCOUNTER — APPOINTMENT (OUTPATIENT)
Age: 65
End: 2025-03-28

## 2025-03-28 VITALS
DIASTOLIC BLOOD PRESSURE: 76 MMHG | RESPIRATION RATE: 16 BRPM | HEART RATE: 84 BPM | TEMPERATURE: 98.2 F | SYSTOLIC BLOOD PRESSURE: 144 MMHG

## 2025-03-28 DIAGNOSIS — Z90.49 ACQUIRED ABSENCE OF OTHER SPECIFIED PARTS OF DIGESTIVE TRACT: Chronic | ICD-10-CM

## 2025-03-28 DIAGNOSIS — C54.1 MALIGNANT NEOPLASM OF ENDOMETRIUM: ICD-10-CM

## 2025-03-28 DIAGNOSIS — Z98.890 OTHER SPECIFIED POSTPROCEDURAL STATES: Chronic | ICD-10-CM

## 2025-03-28 PROCEDURE — 96372 THER/PROPH/DIAG INJ SC/IM: CPT

## 2025-03-28 RX ORDER — CYANOCOBALAMIN 1000 UG/ML
1000 INJECTION INTRAMUSCULAR; SUBCUTANEOUS ONCE
Refills: 0 | Status: COMPLETED | OUTPATIENT
Start: 2025-03-28 | End: 2025-03-28

## 2025-03-28 RX ADMIN — CYANOCOBALAMIN 1000 MICROGRAM(S): 1000 INJECTION INTRAMUSCULAR; SUBCUTANEOUS at 12:43

## 2025-04-01 ENCOUNTER — OUTPATIENT (OUTPATIENT)
Dept: OUTPATIENT SERVICES | Facility: HOSPITAL | Age: 65
LOS: 1 days | End: 2025-04-01
Payer: COMMERCIAL

## 2025-04-01 ENCOUNTER — APPOINTMENT (OUTPATIENT)
Age: 65
End: 2025-04-01
Payer: COMMERCIAL

## 2025-04-01 VITALS
HEART RATE: 91 BPM | HEIGHT: 64 IN | SYSTOLIC BLOOD PRESSURE: 159 MMHG | OXYGEN SATURATION: 99 % | DIASTOLIC BLOOD PRESSURE: 74 MMHG | TEMPERATURE: 98.3 F | BODY MASS INDEX: 43.19 KG/M2 | WEIGHT: 253 LBS | RESPIRATION RATE: 16 BRPM

## 2025-04-01 DIAGNOSIS — Z98.890 OTHER SPECIFIED POSTPROCEDURAL STATES: Chronic | ICD-10-CM

## 2025-04-01 DIAGNOSIS — C54.1 MALIGNANT NEOPLASM OF ENDOMETRIUM: ICD-10-CM

## 2025-04-01 DIAGNOSIS — E53.8 DEFICIENCY OF OTHER SPECIFIED B GROUP VITAMINS: ICD-10-CM

## 2025-04-01 DIAGNOSIS — Z90.49 ACQUIRED ABSENCE OF OTHER SPECIFIED PARTS OF DIGESTIVE TRACT: Chronic | ICD-10-CM

## 2025-04-01 DIAGNOSIS — Z51.11 ENCOUNTER FOR ANTINEOPLASTIC CHEMOTHERAPY: ICD-10-CM

## 2025-04-01 LAB
ALBUMIN SERPL ELPH-MCNC: 4 G/DL
ALP BLD-CCNC: 83 U/L
ALT SERPL-CCNC: 13 U/L
ANION GAP SERPL CALC-SCNC: 14 MMOL/L
AST SERPL-CCNC: 12 U/L
AUTO BASOPHILS #: 0.04 K/UL
AUTO BASOPHILS %: 0.5 %
AUTO EOSINOPHILS #: 0.28 K/UL
AUTO EOSINOPHILS %: 3.8 %
AUTO IMMATURE GRANULOCYTES #: 0.03 K/UL
AUTO LYMPHOCYTES #: 1.94 K/UL
AUTO LYMPHOCYTES %: 26.4 %
AUTO MONOCYTES #: 0.55 K/UL
AUTO MONOCYTES %: 7.5 %
AUTO NEUTROPHILS #: 4.5 K/UL
AUTO NEUTROPHILS %: 61.4 %
AUTO NRBC #: 0 K/UL
BILIRUB SERPL-MCNC: 0.2 MG/DL
BUN SERPL-MCNC: 20 MG/DL
CALCIUM SERPL-MCNC: 9.2 MG/DL
CHLORIDE SERPL-SCNC: 101 MMOL/L
CO2 SERPL-SCNC: 26 MMOL/L
CREAT SERPL-MCNC: 1.1 MG/DL
EGFRCR SERPLBLD CKD-EPI 2021: 56 ML/MIN/1.73M2
GLUCOSE SERPL-MCNC: 156 MG/DL
HCT VFR BLD CALC: 34.4 %
HGB BLD-MCNC: 11 G/DL
IMM GRANULOCYTES NFR BLD AUTO: 0.4 %
MAGNESIUM SERPL-MCNC: 1.8 MG/DL
MAN DIFF?: NORMAL
MCHC RBC-ENTMCNC: 25.4 PG
MCHC RBC-ENTMCNC: 32 G/DL
MCV RBC AUTO: 79.4 FL
PLATELET # BLD AUTO: 228 K/UL
PMV BLD AUTO: 0 /100 WBCS
PMV BLD: 11 FL
POTASSIUM SERPL-SCNC: 4.2 MMOL/L
PROT SERPL-MCNC: 6.6 G/DL
RBC # BLD: 4.33 M/UL
RBC # FLD: 14.8 %
SODIUM SERPL-SCNC: 141 MMOL/L
WBC # FLD AUTO: 7.34 K/UL

## 2025-04-01 PROCEDURE — 83735 ASSAY OF MAGNESIUM: CPT

## 2025-04-01 PROCEDURE — 96413 CHEMO IV INFUSION 1 HR: CPT

## 2025-04-01 PROCEDURE — G2211 COMPLEX E/M VISIT ADD ON: CPT | Mod: NC

## 2025-04-01 PROCEDURE — 99215 OFFICE O/P EST HI 40 MIN: CPT

## 2025-04-01 PROCEDURE — 85025 COMPLETE CBC W/AUTO DIFF WBC: CPT

## 2025-04-01 PROCEDURE — 96372 THER/PROPH/DIAG INJ SC/IM: CPT

## 2025-04-01 PROCEDURE — 86304 IMMUNOASSAY TUMOR CA 125: CPT

## 2025-04-01 PROCEDURE — 80053 COMPREHEN METABOLIC PANEL: CPT

## 2025-04-01 RX ORDER — CYANOCOBALAMIN 1000 UG/ML
1000 INJECTION INTRAMUSCULAR; SUBCUTANEOUS ONCE
Refills: 0 | Status: COMPLETED | OUTPATIENT
Start: 2025-04-01 | End: 2025-04-01

## 2025-04-01 RX ORDER — DOSTARLIMAB 50 MG/ML
500 INJECTION INTRAVENOUS ONCE
Refills: 0 | Status: COMPLETED | OUTPATIENT
Start: 2025-04-01 | End: 2025-04-01

## 2025-04-01 RX ADMIN — DOSTARLIMAB 500 MILLIGRAM(S): 50 INJECTION INTRAVENOUS at 11:21

## 2025-04-01 RX ADMIN — CYANOCOBALAMIN 1000 MICROGRAM(S): 1000 INJECTION INTRAMUSCULAR; SUBCUTANEOUS at 10:44

## 2025-04-01 RX ADMIN — DOSTARLIMAB 500 MILLIGRAM(S): 50 INJECTION INTRAVENOUS at 11:55

## 2025-04-02 DIAGNOSIS — C54.1 MALIGNANT NEOPLASM OF ENDOMETRIUM: ICD-10-CM

## 2025-04-02 PROBLEM — E53.8 VITAMIN B12 DEFICIENCY: Status: ACTIVE | Noted: 2025-04-02

## 2025-04-02 LAB — CANCER AG125 SERPL-ACNC: 4 U/ML

## 2025-04-08 ENCOUNTER — APPOINTMENT (OUTPATIENT)
Facility: HOSPITAL | Age: 65
End: 2025-04-08
Payer: COMMERCIAL

## 2025-04-08 ENCOUNTER — OUTPATIENT (OUTPATIENT)
Dept: OUTPATIENT SERVICES | Facility: HOSPITAL | Age: 65
LOS: 1 days | End: 2025-04-08
Payer: COMMERCIAL

## 2025-04-08 VITALS
OXYGEN SATURATION: 97 % | HEART RATE: 77 BPM | DIASTOLIC BLOOD PRESSURE: 84 MMHG | SYSTOLIC BLOOD PRESSURE: 162 MMHG | WEIGHT: 255 LBS | BODY MASS INDEX: 43.54 KG/M2 | HEIGHT: 64 IN | RESPIRATION RATE: 16 BRPM | TEMPERATURE: 98.2 F

## 2025-04-08 DIAGNOSIS — I10 ESSENTIAL (PRIMARY) HYPERTENSION: ICD-10-CM

## 2025-04-08 DIAGNOSIS — I25.10 ATHEROSCLEROTIC HEART DISEASE OF NATIVE CORONARY ARTERY W/OUT ANGINA PECTORIS: ICD-10-CM

## 2025-04-08 DIAGNOSIS — Z29.89 ENCOUNTER. FOR OTHER SPECIFIED PROPHYLACTIC MEASURES: ICD-10-CM

## 2025-04-08 DIAGNOSIS — E78.5 HYPERLIPIDEMIA, UNSPECIFIED: ICD-10-CM

## 2025-04-08 DIAGNOSIS — Z98.890 OTHER SPECIFIED POSTPROCEDURAL STATES: Chronic | ICD-10-CM

## 2025-04-08 DIAGNOSIS — C54.1 MALIGNANT NEOPLASM OF ENDOMETRIUM: ICD-10-CM

## 2025-04-08 PROCEDURE — 99214 OFFICE O/P EST MOD 30 MIN: CPT | Mod: 25

## 2025-04-08 PROCEDURE — 93005 ELECTROCARDIOGRAM TRACING: CPT

## 2025-04-08 PROCEDURE — 93010 ELECTROCARDIOGRAM REPORT: CPT

## 2025-04-08 PROCEDURE — 99214 OFFICE O/P EST MOD 30 MIN: CPT

## 2025-04-09 DIAGNOSIS — I10 ESSENTIAL (PRIMARY) HYPERTENSION: ICD-10-CM

## 2025-04-22 ENCOUNTER — APPOINTMENT (OUTPATIENT)
Age: 65
End: 2025-04-22
Payer: COMMERCIAL

## 2025-04-22 ENCOUNTER — OUTPATIENT (OUTPATIENT)
Dept: OUTPATIENT SERVICES | Facility: HOSPITAL | Age: 65
LOS: 1 days | End: 2025-04-22
Payer: COMMERCIAL

## 2025-04-22 VITALS
SYSTOLIC BLOOD PRESSURE: 143 MMHG | HEART RATE: 86 BPM | RESPIRATION RATE: 17 BRPM | WEIGHT: 252.8 LBS | BODY MASS INDEX: 43.16 KG/M2 | DIASTOLIC BLOOD PRESSURE: 78 MMHG | OXYGEN SATURATION: 99 % | TEMPERATURE: 98.1 F | HEIGHT: 64 IN

## 2025-04-22 DIAGNOSIS — Z98.890 OTHER SPECIFIED POSTPROCEDURAL STATES: Chronic | ICD-10-CM

## 2025-04-22 DIAGNOSIS — Z51.11 ENCOUNTER FOR ANTINEOPLASTIC CHEMOTHERAPY: ICD-10-CM

## 2025-04-22 DIAGNOSIS — C54.1 MALIGNANT NEOPLASM OF ENDOMETRIUM: ICD-10-CM

## 2025-04-22 DIAGNOSIS — Z90.49 ACQUIRED ABSENCE OF OTHER SPECIFIED PARTS OF DIGESTIVE TRACT: Chronic | ICD-10-CM

## 2025-04-22 LAB
ALBUMIN SERPL ELPH-MCNC: 3.9 G/DL
ALP BLD-CCNC: 86 U/L
ALT SERPL-CCNC: 13 U/L
ANION GAP SERPL CALC-SCNC: 13 MMOL/L
AST SERPL-CCNC: 12 U/L
AUTO BASOPHILS #: 0.04 K/UL
AUTO BASOPHILS %: 0.5 %
AUTO EOSINOPHILS #: 0.33 K/UL
AUTO EOSINOPHILS %: 4 %
AUTO IMMATURE GRANULOCYTES #: 0.05 K/UL
AUTO LYMPHOCYTES #: 2 K/UL
AUTO LYMPHOCYTES %: 24.3 %
AUTO MONOCYTES #: 0.54 K/UL
AUTO MONOCYTES %: 6.6 %
AUTO NEUTROPHILS #: 5.27 K/UL
AUTO NEUTROPHILS %: 64 %
AUTO NRBC #: 0 K/UL
BILIRUB SERPL-MCNC: 0.2 MG/DL
BUN SERPL-MCNC: 21 MG/DL
CALCIUM SERPL-MCNC: 9.3 MG/DL
CHLORIDE SERPL-SCNC: 100 MMOL/L
CO2 SERPL-SCNC: 25 MMOL/L
CREAT SERPL-MCNC: 1.1 MG/DL
EGFRCR SERPLBLD CKD-EPI 2021: 56 ML/MIN/1.73M2
GLUCOSE SERPL-MCNC: 186 MG/DL
HCT VFR BLD CALC: 32.8 %
HGB BLD-MCNC: 10.3 G/DL
IMM GRANULOCYTES NFR BLD AUTO: 0.6 %
MAGNESIUM SERPL-MCNC: 1.6 MG/DL
MAN DIFF?: NORMAL
MCHC RBC-ENTMCNC: 25.2 PG
MCHC RBC-ENTMCNC: 31.4 G/DL
MCV RBC AUTO: 80.2 FL
PLATELET # BLD AUTO: 283 K/UL
PMV BLD AUTO: 0 /100 WBCS
PMV BLD: 9.6 FL
POTASSIUM SERPL-SCNC: 4 MMOL/L
PROT SERPL-MCNC: 6.6 G/DL
RBC # BLD: 4.09 M/UL
RBC # FLD: 14.9 %
SODIUM SERPL-SCNC: 138 MMOL/L
WBC # FLD AUTO: 8.23 K/UL

## 2025-04-22 PROCEDURE — G2211 COMPLEX E/M VISIT ADD ON: CPT | Mod: NC

## 2025-04-22 PROCEDURE — 99215 OFFICE O/P EST HI 40 MIN: CPT

## 2025-04-22 PROCEDURE — 96413 CHEMO IV INFUSION 1 HR: CPT

## 2025-04-22 PROCEDURE — 86304 IMMUNOASSAY TUMOR CA 125: CPT

## 2025-04-22 PROCEDURE — 80053 COMPREHEN METABOLIC PANEL: CPT

## 2025-04-22 PROCEDURE — 85025 COMPLETE CBC W/AUTO DIFF WBC: CPT

## 2025-04-22 PROCEDURE — 83735 ASSAY OF MAGNESIUM: CPT

## 2025-04-22 RX ORDER — DOSTARLIMAB 50 MG/ML
500 INJECTION INTRAVENOUS ONCE
Refills: 0 | Status: COMPLETED | OUTPATIENT
Start: 2025-04-22 | End: 2025-04-22

## 2025-04-22 RX ADMIN — DOSTARLIMAB 500 MILLIGRAM(S): 50 INJECTION INTRAVENOUS at 12:36

## 2025-04-22 RX ADMIN — DOSTARLIMAB 500 MILLIGRAM(S): 50 INJECTION INTRAVENOUS at 12:05

## 2025-04-23 LAB — CANCER AG125 SERPL-ACNC: 5 U/ML

## 2025-05-13 ENCOUNTER — APPOINTMENT (OUTPATIENT)
Age: 65
End: 2025-05-13
Payer: COMMERCIAL

## 2025-05-13 ENCOUNTER — OUTPATIENT (OUTPATIENT)
Dept: OUTPATIENT SERVICES | Facility: HOSPITAL | Age: 65
LOS: 1 days | End: 2025-05-13
Payer: COMMERCIAL

## 2025-05-13 VITALS
SYSTOLIC BLOOD PRESSURE: 143 MMHG | RESPIRATION RATE: 16 BRPM | OXYGEN SATURATION: 100 % | DIASTOLIC BLOOD PRESSURE: 73 MMHG | TEMPERATURE: 98.6 F | HEART RATE: 80 BPM

## 2025-05-13 VITALS — WEIGHT: 254 LBS | HEIGHT: 64 IN | BODY MASS INDEX: 43.36 KG/M2

## 2025-05-13 VITALS — HEART RATE: 80 BPM | DIASTOLIC BLOOD PRESSURE: 73 MMHG | TEMPERATURE: 99 F | SYSTOLIC BLOOD PRESSURE: 143 MMHG

## 2025-05-13 DIAGNOSIS — E53.8 DEFICIENCY OF OTHER SPECIFIED B GROUP VITAMINS: ICD-10-CM

## 2025-05-13 DIAGNOSIS — D63.0 ANEMIA IN NEOPLASTIC DISEASE: ICD-10-CM

## 2025-05-13 DIAGNOSIS — Z51.11 ENCOUNTER FOR ANTINEOPLASTIC CHEMOTHERAPY: ICD-10-CM

## 2025-05-13 DIAGNOSIS — Z90.49 ACQUIRED ABSENCE OF OTHER SPECIFIED PARTS OF DIGESTIVE TRACT: Chronic | ICD-10-CM

## 2025-05-13 DIAGNOSIS — C54.1 MALIGNANT NEOPLASM OF ENDOMETRIUM: ICD-10-CM

## 2025-05-13 LAB
ALBUMIN SERPL ELPH-MCNC: 3.9 G/DL
ALP BLD-CCNC: 88 U/L
ALT SERPL-CCNC: 12 U/L
ANION GAP SERPL CALC-SCNC: 13 MMOL/L
AST SERPL-CCNC: 12 U/L
AUTO BASOPHILS #: 0.03 K/UL
AUTO BASOPHILS %: 0.4 %
AUTO EOSINOPHILS #: 0.35 K/UL
AUTO EOSINOPHILS %: 4.4 %
AUTO IMMATURE GRANULOCYTES #: 0.02 K/UL
AUTO LYMPHOCYTES #: 1.9 K/UL
AUTO LYMPHOCYTES %: 23.9 %
AUTO MONOCYTES #: 0.55 K/UL
AUTO MONOCYTES %: 6.9 %
AUTO NEUTROPHILS #: 5.09 K/UL
AUTO NEUTROPHILS %: 64.1 %
AUTO NRBC #: 0 K/UL
BILIRUB SERPL-MCNC: 0.3 MG/DL
BUN SERPL-MCNC: 21 MG/DL
CALCIUM SERPL-MCNC: 9.2 MG/DL
CHLORIDE SERPL-SCNC: 102 MMOL/L
CO2 SERPL-SCNC: 24 MMOL/L
CREAT SERPL-MCNC: 1 MG/DL
EGFRCR SERPLBLD CKD-EPI 2021: 63 ML/MIN/1.73M2
GLUCOSE SERPL-MCNC: 188 MG/DL
HCT VFR BLD CALC: 34.1 %
HGB BLD-MCNC: 10.8 G/DL
IMM GRANULOCYTES NFR BLD AUTO: 0.3 %
MAGNESIUM SERPL-MCNC: 1.8 MG/DL
MAN DIFF?: NORMAL
MCHC RBC-ENTMCNC: 25.6 PG
MCHC RBC-ENTMCNC: 31.7 G/DL
MCV RBC AUTO: 80.8 FL
PLATELET # BLD AUTO: 236 K/UL
PMV BLD AUTO: 0 /100 WBCS
PMV BLD: 10.1 FL
POTASSIUM SERPL-SCNC: 4.1 MMOL/L
PROT SERPL-MCNC: 6.6 G/DL
RBC # BLD: 4.22 M/UL
RBC # FLD: 14.7 %
SODIUM SERPL-SCNC: 139 MMOL/L
WBC # FLD AUTO: 7.94 K/UL

## 2025-05-13 PROCEDURE — 96372 THER/PROPH/DIAG INJ SC/IM: CPT

## 2025-05-13 PROCEDURE — 99215 OFFICE O/P EST HI 40 MIN: CPT

## 2025-05-13 PROCEDURE — 86304 IMMUNOASSAY TUMOR CA 125: CPT

## 2025-05-13 PROCEDURE — G2211 COMPLEX E/M VISIT ADD ON: CPT | Mod: NC

## 2025-05-13 PROCEDURE — 83735 ASSAY OF MAGNESIUM: CPT

## 2025-05-13 PROCEDURE — 80053 COMPREHEN METABOLIC PANEL: CPT

## 2025-05-13 PROCEDURE — 85025 COMPLETE CBC W/AUTO DIFF WBC: CPT

## 2025-05-13 PROCEDURE — 96413 CHEMO IV INFUSION 1 HR: CPT

## 2025-05-13 RX ORDER — DOSTARLIMAB 50 MG/ML
500 INJECTION INTRAVENOUS ONCE
Refills: 0 | Status: COMPLETED | OUTPATIENT
Start: 2025-05-13 | End: 2025-05-13

## 2025-05-13 RX ORDER — CYANOCOBALAMIN 1000 UG/ML
1000 INJECTION INTRAMUSCULAR; SUBCUTANEOUS ONCE
Refills: 0 | Status: COMPLETED | OUTPATIENT
Start: 2025-05-13 | End: 2025-05-13

## 2025-05-13 RX ADMIN — CYANOCOBALAMIN 1000 MICROGRAM(S): 1000 INJECTION INTRAMUSCULAR; SUBCUTANEOUS at 10:51

## 2025-05-13 RX ADMIN — DOSTARLIMAB 500 MILLIGRAM(S): 50 INJECTION INTRAVENOUS at 11:45

## 2025-05-13 RX ADMIN — DOSTARLIMAB 500 MILLIGRAM(S): 50 INJECTION INTRAVENOUS at 11:15

## 2025-05-14 DIAGNOSIS — D63.0 ANEMIA IN NEOPLASTIC DISEASE: ICD-10-CM

## 2025-05-14 LAB — CANCER AG125 SERPL-ACNC: 6 U/ML

## 2025-05-22 ENCOUNTER — RESULT REVIEW (OUTPATIENT)
Age: 65
End: 2025-05-22

## 2025-05-22 ENCOUNTER — APPOINTMENT (OUTPATIENT)
Dept: CV DIAGNOSITCS | Facility: HOSPITAL | Age: 65
End: 2025-05-22
Payer: COMMERCIAL

## 2025-05-22 ENCOUNTER — OUTPATIENT (OUTPATIENT)
Dept: OUTPATIENT SERVICES | Facility: HOSPITAL | Age: 65
LOS: 1 days | End: 2025-05-22
Payer: COMMERCIAL

## 2025-05-22 DIAGNOSIS — Z98.890 OTHER SPECIFIED POSTPROCEDURAL STATES: Chronic | ICD-10-CM

## 2025-05-22 DIAGNOSIS — Z29.89 ENCOUNTER FOR OTHER SPECIFIED PROPHYLACTIC MEASURES: ICD-10-CM

## 2025-05-22 DIAGNOSIS — Z90.49 ACQUIRED ABSENCE OF OTHER SPECIFIED PARTS OF DIGESTIVE TRACT: Chronic | ICD-10-CM

## 2025-05-22 DIAGNOSIS — I10 ESSENTIAL (PRIMARY) HYPERTENSION: ICD-10-CM

## 2025-05-22 PROCEDURE — 93306 TTE W/DOPPLER COMPLETE: CPT | Mod: 26

## 2025-05-22 PROCEDURE — 93306 TTE W/DOPPLER COMPLETE: CPT

## 2025-05-23 DIAGNOSIS — I10 ESSENTIAL (PRIMARY) HYPERTENSION: ICD-10-CM

## 2025-05-23 DIAGNOSIS — Z29.89 ENCOUNTER FOR OTHER SPECIFIED PROPHYLACTIC MEASURES: ICD-10-CM

## 2025-05-28 ENCOUNTER — RX RENEWAL (OUTPATIENT)
Age: 65
End: 2025-05-28

## 2025-06-03 ENCOUNTER — OUTPATIENT (OUTPATIENT)
Dept: OUTPATIENT SERVICES | Facility: HOSPITAL | Age: 65
LOS: 1 days | End: 2025-06-03
Payer: COMMERCIAL

## 2025-06-03 ENCOUNTER — APPOINTMENT (OUTPATIENT)
Age: 65
End: 2025-06-03

## 2025-06-03 DIAGNOSIS — C54.1 MALIGNANT NEOPLASM OF ENDOMETRIUM: ICD-10-CM

## 2025-06-03 DIAGNOSIS — D63.0 ANEMIA IN NEOPLASTIC DISEASE: ICD-10-CM

## 2025-06-03 DIAGNOSIS — E53.8 DEFICIENCY OF OTHER SPECIFIED B GROUP VITAMINS: ICD-10-CM

## 2025-06-03 DIAGNOSIS — Z90.49 ACQUIRED ABSENCE OF OTHER SPECIFIED PARTS OF DIGESTIVE TRACT: Chronic | ICD-10-CM

## 2025-06-03 DIAGNOSIS — Z98.890 OTHER SPECIFIED POSTPROCEDURAL STATES: Chronic | ICD-10-CM

## 2025-06-03 LAB
ALBUMIN SERPL ELPH-MCNC: 4.1 G/DL
ALP BLD-CCNC: 90 U/L
ALT SERPL-CCNC: 14 U/L
ANION GAP SERPL CALC-SCNC: 14 MMOL/L
AST SERPL-CCNC: 11 U/L
AUTO BASOPHILS #: 0.05 K/UL
AUTO BASOPHILS %: 0.7 %
AUTO EOSINOPHILS #: 0.28 K/UL
AUTO EOSINOPHILS %: 3.8 %
AUTO IMMATURE GRANULOCYTES #: 0.02 K/UL
AUTO LYMPHOCYTES #: 1.95 K/UL
AUTO LYMPHOCYTES %: 26.6 %
AUTO MONOCYTES #: 0.49 K/UL
AUTO MONOCYTES %: 6.7 %
AUTO NEUTROPHILS #: 4.53 K/UL
AUTO NEUTROPHILS %: 61.9 %
AUTO NRBC #: 0 K/UL
BILIRUB SERPL-MCNC: <0.2 MG/DL
BUN SERPL-MCNC: 24 MG/DL
CALCIUM SERPL-MCNC: 9.2 MG/DL
CHLORIDE SERPL-SCNC: 101 MMOL/L
CO2 SERPL-SCNC: 25 MMOL/L
CREAT SERPL-MCNC: 1.2 MG/DL
EGFRCR SERPLBLD CKD-EPI 2021: 51 ML/MIN/1.73M2
GLUCOSE SERPL-MCNC: 236 MG/DL
HCT VFR BLD CALC: 32.5 %
HGB BLD-MCNC: 10.2 G/DL
IMM GRANULOCYTES NFR BLD AUTO: 0.3 %
MAGNESIUM SERPL-MCNC: 1.7 MG/DL
MAN DIFF?: NORMAL
MCHC RBC-ENTMCNC: 25.4 PG
MCHC RBC-ENTMCNC: 31.4 G/DL
MCV RBC AUTO: 81 FL
PLATELET # BLD AUTO: 244 K/UL
PMV BLD AUTO: 0 /100 WBCS
PMV BLD: 10.7 FL
POTASSIUM SERPL-SCNC: 3.8 MMOL/L
PROT SERPL-MCNC: 6.5 G/DL
RBC # BLD: 4.01 M/UL
RBC # FLD: 14.7 %
SODIUM SERPL-SCNC: 140 MMOL/L
WBC # FLD AUTO: 7.32 K/UL

## 2025-06-03 PROCEDURE — 86304 IMMUNOASSAY TUMOR CA 125: CPT

## 2025-06-03 PROCEDURE — 99215 OFFICE O/P EST HI 40 MIN: CPT

## 2025-06-03 PROCEDURE — G2211 COMPLEX E/M VISIT ADD ON: CPT | Mod: NC

## 2025-06-03 PROCEDURE — 83735 ASSAY OF MAGNESIUM: CPT

## 2025-06-03 PROCEDURE — 85025 COMPLETE CBC W/AUTO DIFF WBC: CPT

## 2025-06-03 PROCEDURE — 96413 CHEMO IV INFUSION 1 HR: CPT

## 2025-06-03 PROCEDURE — 80053 COMPREHEN METABOLIC PANEL: CPT

## 2025-06-03 RX ORDER — DOSTARLIMAB 50 MG/ML
500 INJECTION INTRAVENOUS ONCE
Refills: 0 | Status: COMPLETED | OUTPATIENT
Start: 2025-06-03 | End: 2025-06-03

## 2025-06-03 RX ADMIN — DOSTARLIMAB 500 MILLIGRAM(S): 50 INJECTION INTRAVENOUS at 14:20

## 2025-06-03 RX ADMIN — DOSTARLIMAB 500 MILLIGRAM(S): 50 INJECTION INTRAVENOUS at 14:50

## 2025-06-04 DIAGNOSIS — C54.1 MALIGNANT NEOPLASM OF ENDOMETRIUM: ICD-10-CM

## 2025-06-04 LAB — CANCER AG125 SERPL-ACNC: 6 U/ML

## 2025-06-11 ENCOUNTER — NON-APPOINTMENT (OUTPATIENT)
Age: 65
End: 2025-06-11

## 2025-06-11 ENCOUNTER — APPOINTMENT (OUTPATIENT)
Dept: GYNECOLOGIC ONCOLOGY | Facility: CLINIC | Age: 65
End: 2025-06-11
Payer: COMMERCIAL

## 2025-06-11 VITALS
SYSTOLIC BLOOD PRESSURE: 141 MMHG | HEIGHT: 64 IN | BODY MASS INDEX: 43.36 KG/M2 | DIASTOLIC BLOOD PRESSURE: 71 MMHG | WEIGHT: 254 LBS | HEART RATE: 82 BPM

## 2025-06-11 PROCEDURE — 99214 OFFICE O/P EST MOD 30 MIN: CPT

## 2025-06-23 ENCOUNTER — RESULT REVIEW (OUTPATIENT)
Age: 65
End: 2025-06-23

## 2025-06-23 ENCOUNTER — OUTPATIENT (OUTPATIENT)
Dept: OUTPATIENT SERVICES | Facility: HOSPITAL | Age: 65
LOS: 1 days | End: 2025-06-23
Payer: COMMERCIAL

## 2025-06-23 DIAGNOSIS — Z98.890 OTHER SPECIFIED POSTPROCEDURAL STATES: Chronic | ICD-10-CM

## 2025-06-23 DIAGNOSIS — Z90.49 ACQUIRED ABSENCE OF OTHER SPECIFIED PARTS OF DIGESTIVE TRACT: Chronic | ICD-10-CM

## 2025-06-23 DIAGNOSIS — N28.9 DISORDER OF KIDNEY AND URETER, UNSPECIFIED: ICD-10-CM

## 2025-06-23 DIAGNOSIS — Z00.8 ENCOUNTER FOR OTHER GENERAL EXAMINATION: ICD-10-CM

## 2025-06-23 PROCEDURE — 71260 CT THORAX DX C+: CPT | Mod: 26

## 2025-06-23 PROCEDURE — 74177 CT ABD & PELVIS W/CONTRAST: CPT

## 2025-06-23 PROCEDURE — 74177 CT ABD & PELVIS W/CONTRAST: CPT | Mod: 26

## 2025-06-23 PROCEDURE — 71260 CT THORAX DX C+: CPT

## 2025-06-24 ENCOUNTER — APPOINTMENT (OUTPATIENT)
Age: 65
End: 2025-06-24

## 2025-06-24 DIAGNOSIS — N28.9 DISORDER OF KIDNEY AND URETER, UNSPECIFIED: ICD-10-CM

## 2025-06-27 ENCOUNTER — APPOINTMENT (OUTPATIENT)
Age: 65
End: 2025-06-27

## 2025-07-01 ENCOUNTER — APPOINTMENT (OUTPATIENT)
Age: 65
End: 2025-07-01
Payer: COMMERCIAL

## 2025-07-01 VITALS
HEART RATE: 96 BPM | RESPIRATION RATE: 17 BRPM | HEIGHT: 64 IN | TEMPERATURE: 98.2 F | WEIGHT: 253 LBS | SYSTOLIC BLOOD PRESSURE: 138 MMHG | DIASTOLIC BLOOD PRESSURE: 72 MMHG | OXYGEN SATURATION: 98 % | BODY MASS INDEX: 43.19 KG/M2

## 2025-07-01 DIAGNOSIS — C54.1 MALIGNANT NEOPLASM OF ENDOMETRIUM: ICD-10-CM

## 2025-07-01 LAB
ALBUMIN SERPL ELPH-MCNC: 4.1 G/DL
ALP BLD-CCNC: 89 U/L
ALT SERPL-CCNC: 14 U/L
ANION GAP SERPL CALC-SCNC: 13 MMOL/L
AST SERPL-CCNC: 12 U/L
AUTO BASOPHILS #: 0.04 K/UL
AUTO BASOPHILS %: 0.5 %
AUTO EOSINOPHILS #: 0.29 K/UL
AUTO EOSINOPHILS %: 3.3 %
AUTO IMMATURE GRANULOCYTES #: 0.04 K/UL
AUTO LYMPHOCYTES #: 1.97 K/UL
AUTO LYMPHOCYTES %: 22.6 %
AUTO MONOCYTES #: 0.75 K/UL
AUTO MONOCYTES %: 8.6 %
AUTO NEUTROPHILS #: 5.63 K/UL
AUTO NEUTROPHILS %: 64.5 %
AUTO NRBC #: 0 K/UL
BILIRUB SERPL-MCNC: 0.2 MG/DL
BUN SERPL-MCNC: 25 MG/DL
CALCIUM SERPL-MCNC: 9.3 MG/DL
CHLORIDE SERPL-SCNC: 100 MMOL/L
CO2 SERPL-SCNC: 26 MMOL/L
CREAT SERPL-MCNC: 1.2 MG/DL
EGFRCR SERPLBLD CKD-EPI 2021: 51 ML/MIN/1.73M2
GLUCOSE SERPL-MCNC: 183 MG/DL
HCT VFR BLD CALC: 34.3 %
HGB BLD-MCNC: 11 G/DL
IMM GRANULOCYTES NFR BLD AUTO: 0.5 %
MAN DIFF?: NORMAL
MCHC RBC-ENTMCNC: 25.6 PG
MCHC RBC-ENTMCNC: 32.1 G/DL
MCV RBC AUTO: 79.8 FL
PLATELET # BLD AUTO: 241 K/UL
PMV BLD AUTO: 0 /100 WBCS
PMV BLD: 10.3 FL
POTASSIUM SERPL-SCNC: 4.4 MMOL/L
PROT SERPL-MCNC: 6.8 G/DL
RBC # BLD: 4.3 M/UL
RBC # FLD: 15 %
SODIUM SERPL-SCNC: 139 MMOL/L
WBC # FLD AUTO: 8.72 K/UL

## 2025-07-01 PROCEDURE — 99215 OFFICE O/P EST HI 40 MIN: CPT

## 2025-07-01 PROCEDURE — G2211 COMPLEX E/M VISIT ADD ON: CPT | Mod: NC

## 2025-07-02 LAB — CANCER AG125 SERPL-ACNC: 7 U/ML

## 2025-07-15 ENCOUNTER — APPOINTMENT (OUTPATIENT)
Facility: HOSPITAL | Age: 65
End: 2025-07-15

## 2025-07-22 ENCOUNTER — APPOINTMENT (OUTPATIENT)
Age: 65
End: 2025-07-22
Payer: COMMERCIAL

## 2025-07-22 VITALS
WEIGHT: 251.19 LBS | RESPIRATION RATE: 16 BRPM | DIASTOLIC BLOOD PRESSURE: 75 MMHG | BODY MASS INDEX: 42.88 KG/M2 | HEART RATE: 81 BPM | SYSTOLIC BLOOD PRESSURE: 132 MMHG | OXYGEN SATURATION: 99 % | TEMPERATURE: 97.8 F | HEIGHT: 64 IN

## 2025-07-22 PROCEDURE — 99215 OFFICE O/P EST HI 40 MIN: CPT

## 2025-07-22 PROCEDURE — G2211 COMPLEX E/M VISIT ADD ON: CPT | Mod: NC

## 2025-07-24 LAB
ALBUMIN SERPL ELPH-MCNC: 4 G/DL
ALP BLD-CCNC: 87 U/L
ALT SERPL-CCNC: 12 U/L
ANION GAP SERPL CALC-SCNC: 11 MMOL/L
AST SERPL-CCNC: 13 U/L
AUTO BASOPHILS #: 0.05 K/UL
AUTO BASOPHILS %: 0.6 %
AUTO EOSINOPHILS #: 0.35 K/UL
AUTO EOSINOPHILS %: 4.3 %
AUTO IMMATURE GRANULOCYTES #: 0.03 K/UL
AUTO LYMPHOCYTES #: 1.84 K/UL
AUTO LYMPHOCYTES %: 22.6 %
AUTO MONOCYTES #: 0.64 K/UL
AUTO MONOCYTES %: 7.9 %
AUTO NEUTROPHILS #: 5.24 K/UL
AUTO NEUTROPHILS %: 64.2 %
AUTO NRBC #: 0 K/UL
BILIRUB SERPL-MCNC: 0.2 MG/DL
BUN SERPL-MCNC: 25 MG/DL
CALCIUM SERPL-MCNC: 9.2 MG/DL
CANCER AG125 SERPL-ACNC: 8 U/ML
CHLORIDE SERPL-SCNC: 98 MMOL/L
CO2 SERPL-SCNC: 27 MMOL/L
CREAT SERPL-MCNC: 1.2 MG/DL
EGFRCR SERPLBLD CKD-EPI 2021: 51 ML/MIN/1.73M2
GLUCOSE SERPL-MCNC: 155 MG/DL
HCT VFR BLD CALC: 34.8 %
HGB BLD-MCNC: 10.8 G/DL
IMM GRANULOCYTES NFR BLD AUTO: 0.4 %
MAGNESIUM SERPL-MCNC: 1.8 MG/DL
MAN DIFF?: NORMAL
MCHC RBC-ENTMCNC: 25.1 PG
MCHC RBC-ENTMCNC: 31 G/DL
MCV RBC AUTO: 80.7 FL
PLATELET # BLD AUTO: 229 K/UL
PMV BLD AUTO: 0 /100 WBCS
PMV BLD: 10.2 FL
POTASSIUM SERPL-SCNC: 4.4 MMOL/L
PROT SERPL-MCNC: 6.3 G/DL
RBC # BLD: 4.31 M/UL
RBC # FLD: 14.6 %
SODIUM SERPL-SCNC: 136 MMOL/L
WBC # FLD AUTO: 8.15 K/UL

## 2025-07-29 ENCOUNTER — APPOINTMENT (OUTPATIENT)
Facility: HOSPITAL | Age: 65
End: 2025-07-29
Payer: COMMERCIAL

## 2025-07-29 ENCOUNTER — OUTPATIENT (OUTPATIENT)
Dept: OUTPATIENT SERVICES | Facility: HOSPITAL | Age: 65
LOS: 1 days | End: 2025-07-29
Payer: COMMERCIAL

## 2025-07-29 VITALS
SYSTOLIC BLOOD PRESSURE: 138 MMHG | WEIGHT: 248 LBS | HEART RATE: 86 BPM | BODY MASS INDEX: 42.34 KG/M2 | RESPIRATION RATE: 16 BRPM | DIASTOLIC BLOOD PRESSURE: 75 MMHG | OXYGEN SATURATION: 97 % | HEIGHT: 64 IN | TEMPERATURE: 98.3 F

## 2025-07-29 DIAGNOSIS — I25.10 ATHEROSCLEROTIC HEART DISEASE OF NATIVE CORONARY ARTERY W/OUT ANGINA PECTORIS: ICD-10-CM

## 2025-07-29 DIAGNOSIS — Z29.89 ENCOUNTER. FOR OTHER SPECIFIED PROPHYLACTIC MEASURES: ICD-10-CM

## 2025-07-29 DIAGNOSIS — C54.1 MALIGNANT NEOPLASM OF ENDOMETRIUM: ICD-10-CM

## 2025-07-29 DIAGNOSIS — D50.9 IRON DEFICIENCY ANEMIA, UNSPECIFIED: ICD-10-CM

## 2025-07-29 DIAGNOSIS — E78.5 HYPERLIPIDEMIA, UNSPECIFIED: ICD-10-CM

## 2025-07-29 DIAGNOSIS — Z90.49 ACQUIRED ABSENCE OF OTHER SPECIFIED PARTS OF DIGESTIVE TRACT: Chronic | ICD-10-CM

## 2025-07-29 DIAGNOSIS — Z98.890 OTHER SPECIFIED POSTPROCEDURAL STATES: Chronic | ICD-10-CM

## 2025-07-29 PROCEDURE — 93010 ELECTROCARDIOGRAM REPORT: CPT

## 2025-07-29 PROCEDURE — 93005 ELECTROCARDIOGRAM TRACING: CPT

## 2025-07-29 PROCEDURE — 99204 OFFICE O/P NEW MOD 45 MIN: CPT

## 2025-07-29 PROCEDURE — 73552 X-RAY EXAM OF FEMUR 2/>: CPT | Mod: 26,LT

## 2025-07-29 PROCEDURE — 99214 OFFICE O/P EST MOD 30 MIN: CPT

## 2025-07-29 PROCEDURE — 73552 X-RAY EXAM OF FEMUR 2/>: CPT | Mod: LT

## 2025-07-30 DIAGNOSIS — C54.1 MALIGNANT NEOPLASM OF ENDOMETRIUM: ICD-10-CM

## 2025-07-30 DIAGNOSIS — D50.9 IRON DEFICIENCY ANEMIA, UNSPECIFIED: ICD-10-CM

## 2025-08-10 ENCOUNTER — OUTPATIENT (OUTPATIENT)
Dept: OUTPATIENT SERVICES | Facility: HOSPITAL | Age: 65
LOS: 1 days | End: 2025-08-10
Payer: COMMERCIAL

## 2025-08-10 DIAGNOSIS — Z00.8 ENCOUNTER FOR OTHER GENERAL EXAMINATION: ICD-10-CM

## 2025-08-10 DIAGNOSIS — C54.1 MALIGNANT NEOPLASM OF ENDOMETRIUM: ICD-10-CM

## 2025-08-10 DIAGNOSIS — Z90.49 ACQUIRED ABSENCE OF OTHER SPECIFIED PARTS OF DIGESTIVE TRACT: Chronic | ICD-10-CM

## 2025-08-10 DIAGNOSIS — Z98.890 OTHER SPECIFIED POSTPROCEDURAL STATES: Chronic | ICD-10-CM

## 2025-08-10 PROCEDURE — 73723 MRI JOINT LWR EXTR W/O&W/DYE: CPT | Mod: LT

## 2025-08-10 PROCEDURE — A9579: CPT

## 2025-08-10 PROCEDURE — 73723 MRI JOINT LWR EXTR W/O&W/DYE: CPT | Mod: 26,LT

## 2025-08-11 DIAGNOSIS — C54.1 MALIGNANT NEOPLASM OF ENDOMETRIUM: ICD-10-CM

## 2025-08-12 ENCOUNTER — APPOINTMENT (OUTPATIENT)
Age: 65
End: 2025-08-12
Payer: COMMERCIAL

## 2025-08-12 VITALS
WEIGHT: 250 LBS | TEMPERATURE: 98.3 F | DIASTOLIC BLOOD PRESSURE: 82 MMHG | BODY MASS INDEX: 42.68 KG/M2 | RESPIRATION RATE: 17 BRPM | HEIGHT: 64 IN | SYSTOLIC BLOOD PRESSURE: 128 MMHG | OXYGEN SATURATION: 98 % | HEART RATE: 73 BPM

## 2025-08-12 DIAGNOSIS — Z51.11 ENCOUNTER FOR ANTINEOPLASTIC CHEMOTHERAPY: ICD-10-CM

## 2025-08-12 DIAGNOSIS — C54.1 MALIGNANT NEOPLASM OF ENDOMETRIUM: ICD-10-CM

## 2025-08-12 LAB
ALBUMIN SERPL ELPH-MCNC: 4.2 G/DL
ALP BLD-CCNC: 90 U/L
ALT SERPL-CCNC: 12 U/L
ANION GAP SERPL CALC-SCNC: 15 MMOL/L
AST SERPL-CCNC: 11 U/L
AUTO BASOPHILS #: 0.04 K/UL
AUTO BASOPHILS %: 0.5 %
AUTO EOSINOPHILS #: 0.32 K/UL
AUTO EOSINOPHILS %: 4.3 %
AUTO IMMATURE GRANULOCYTES #: 0.03 K/UL
AUTO LYMPHOCYTES #: 1.74 K/UL
AUTO LYMPHOCYTES %: 23.5 %
AUTO MONOCYTES #: 0.53 K/UL
AUTO MONOCYTES %: 7.2 %
AUTO NEUTROPHILS #: 4.73 K/UL
AUTO NEUTROPHILS %: 64.1 %
AUTO NRBC #: 0 K/UL
BILIRUB SERPL-MCNC: <0.2 MG/DL
BUN SERPL-MCNC: 27 MG/DL
CALCIUM SERPL-MCNC: 9.3 MG/DL
CHLORIDE SERPL-SCNC: 99 MMOL/L
CO2 SERPL-SCNC: 23 MMOL/L
CREAT SERPL-MCNC: 1.1 MG/DL
EGFRCR SERPLBLD CKD-EPI 2021: 56 ML/MIN/1.73M2
GLUCOSE SERPL-MCNC: 224 MG/DL
HCT VFR BLD CALC: 33.1 %
HGB BLD-MCNC: 10.5 G/DL
IMM GRANULOCYTES NFR BLD AUTO: 0.4 %
MAGNESIUM SERPL-MCNC: 1.7 MG/DL
MAN DIFF?: NORMAL
MCHC RBC-ENTMCNC: 25.2 PG
MCHC RBC-ENTMCNC: 31.7 G/DL
MCV RBC AUTO: 79.4 FL
PLATELET # BLD AUTO: 246 K/UL
PMV BLD AUTO: 0 /100 WBCS
PMV BLD: 11.1 FL
POTASSIUM SERPL-SCNC: 4 MMOL/L
PROT SERPL-MCNC: 6.5 G/DL
RBC # BLD: 4.17 M/UL
RBC # FLD: 15.1 %
SODIUM SERPL-SCNC: 137 MMOL/L
WBC # FLD AUTO: 7.39 K/UL

## 2025-08-12 PROCEDURE — 99215 OFFICE O/P EST HI 40 MIN: CPT

## 2025-08-12 PROCEDURE — G2211 COMPLEX E/M VISIT ADD ON: CPT | Mod: NC

## 2025-08-13 LAB
CANCER AG125 SERPL-ACNC: 10 U/ML
T4 FREE SERPL-MCNC: 1.2 NG/DL
TSH SERPL-ACNC: 0.96 UIU/ML

## 2025-09-02 ENCOUNTER — APPOINTMENT (OUTPATIENT)
Age: 65
End: 2025-09-02
Payer: COMMERCIAL

## 2025-09-02 ENCOUNTER — APPOINTMENT (OUTPATIENT)
Age: 65
End: 2025-09-02

## 2025-09-02 VITALS
HEIGHT: 64 IN | DIASTOLIC BLOOD PRESSURE: 72 MMHG | WEIGHT: 252 LBS | HEART RATE: 90 BPM | BODY MASS INDEX: 43.02 KG/M2 | TEMPERATURE: 98.1 F | OXYGEN SATURATION: 100 % | RESPIRATION RATE: 17 BRPM | SYSTOLIC BLOOD PRESSURE: 135 MMHG

## 2025-09-02 DIAGNOSIS — Z29.89 ENCOUNTER. FOR OTHER SPECIFIED PROPHYLACTIC MEASURES: ICD-10-CM

## 2025-09-02 DIAGNOSIS — C54.1 MALIGNANT NEOPLASM OF ENDOMETRIUM: ICD-10-CM

## 2025-09-02 PROCEDURE — 99214 OFFICE O/P EST MOD 30 MIN: CPT

## 2025-09-02 RX ORDER — MAGNESIUM OXIDE 500 MG
500 TABLET ORAL
Qty: 7 | Refills: 1 | Status: ACTIVE | COMMUNITY
Start: 2025-09-02 | End: 1900-01-01

## 2025-09-08 LAB
ALBUMIN SERPL ELPH-MCNC: 4.2 G/DL
ALP BLD-CCNC: 89 U/L
ALT SERPL-CCNC: 11 U/L
ANION GAP SERPL CALC-SCNC: 13 MMOL/L
AST SERPL-CCNC: 11 U/L
AUTO BASOPHILS #: 0.05 K/UL
AUTO BASOPHILS %: 0.6 %
AUTO EOSINOPHILS #: 0.38 K/UL
AUTO EOSINOPHILS %: 4.3 %
AUTO IMMATURE GRANULOCYTES #: 0.08 K/UL
AUTO LYMPHOCYTES #: 2.14 K/UL
AUTO LYMPHOCYTES %: 24 %
AUTO MONOCYTES #: 0.69 K/UL
AUTO MONOCYTES %: 7.7 %
AUTO NEUTROPHILS #: 5.59 K/UL
AUTO NEUTROPHILS %: 62.5 %
AUTO NRBC #: 0 K/UL
BILIRUB SERPL-MCNC: 0.3 MG/DL
BUN SERPL-MCNC: 23 MG/DL
CALCIUM SERPL-MCNC: 9.1 MG/DL
CANCER AG125 SERPL-ACNC: 11 U/ML
CHLORIDE SERPL-SCNC: 100 MMOL/L
CO2 SERPL-SCNC: 26 MMOL/L
CREAT SERPL-MCNC: 1 MG/DL
EGFRCR SERPLBLD CKD-EPI 2021: 63 ML/MIN/1.73M2
GLUCOSE SERPL-MCNC: 100 MG/DL
HCT VFR BLD CALC: 34 %
HGB BLD-MCNC: 10.8 G/DL
IMM GRANULOCYTES NFR BLD AUTO: 0.9 %
MAGNESIUM SERPL-MCNC: 2.2 MG/DL
MAN DIFF?: NORMAL
MCHC RBC-ENTMCNC: 25.1 PG
MCHC RBC-ENTMCNC: 31.8 G/DL
MCV RBC AUTO: 79.1 FL
PLATELET # BLD AUTO: 262 K/UL
PMV BLD AUTO: 0 /100 WBCS
PMV BLD: 10.5 FL
POTASSIUM SERPL-SCNC: 3.9 MMOL/L
PROT SERPL-MCNC: 6.4 G/DL
RBC # BLD: 4.3 M/UL
RBC # FLD: 15.2 %
SODIUM SERPL-SCNC: 139 MMOL/L
WBC # FLD AUTO: 8.93 K/UL

## 2025-09-09 ENCOUNTER — RX RENEWAL (OUTPATIENT)
Age: 65
End: 2025-09-09